# Patient Record
Sex: FEMALE | Race: WHITE | NOT HISPANIC OR LATINO | Employment: FULL TIME | URBAN - METROPOLITAN AREA
[De-identification: names, ages, dates, MRNs, and addresses within clinical notes are randomized per-mention and may not be internally consistent; named-entity substitution may affect disease eponyms.]

---

## 2017-01-03 ENCOUNTER — ALLSCRIPTS OFFICE VISIT (OUTPATIENT)
Dept: OTHER | Facility: OTHER | Age: 27
End: 2017-01-03

## 2017-01-04 ENCOUNTER — LAB REQUISITION (OUTPATIENT)
Dept: LAB | Facility: HOSPITAL | Age: 27
End: 2017-01-04
Payer: COMMERCIAL

## 2017-01-04 ENCOUNTER — OFFICE VISIT (OUTPATIENT)
Dept: CARDIAC REHAB | Age: 27
End: 2017-01-04
Payer: COMMERCIAL

## 2017-01-04 DIAGNOSIS — I47.1 ATRIAL TACHYCARDIA, PAROXYSMAL (HCC): ICD-10-CM

## 2017-01-04 DIAGNOSIS — Z30.432 ENCOUNTER FOR REMOVAL OF INTRAUTERINE CONTRACEPTIVE DEVICE: ICD-10-CM

## 2017-01-04 PROCEDURE — 87070 CULTURE OTHR SPECIMN AEROBIC: CPT | Performed by: OBSTETRICS & GYNECOLOGY

## 2017-01-05 ENCOUNTER — GENERIC CONVERSION - ENCOUNTER (OUTPATIENT)
Dept: OTHER | Facility: OTHER | Age: 27
End: 2017-01-05

## 2017-01-05 ENCOUNTER — ALLSCRIPTS OFFICE VISIT (OUTPATIENT)
Dept: OTHER | Facility: OTHER | Age: 27
End: 2017-01-05

## 2017-01-06 ENCOUNTER — APPOINTMENT (OUTPATIENT)
Dept: CARDIAC REHAB | Age: 27
End: 2017-01-06
Payer: COMMERCIAL

## 2017-01-06 LAB — BACTERIA GENITAL AEROBE CULT: NORMAL

## 2017-01-09 ENCOUNTER — APPOINTMENT (OUTPATIENT)
Dept: CARDIAC REHAB | Age: 27
End: 2017-01-09
Payer: COMMERCIAL

## 2017-01-11 ENCOUNTER — APPOINTMENT (OUTPATIENT)
Dept: CARDIAC REHAB | Age: 27
End: 2017-01-11
Payer: COMMERCIAL

## 2017-01-12 ENCOUNTER — ALLSCRIPTS OFFICE VISIT (OUTPATIENT)
Dept: OTHER | Facility: OTHER | Age: 27
End: 2017-01-12

## 2017-01-13 ENCOUNTER — APPOINTMENT (OUTPATIENT)
Dept: CARDIAC REHAB | Age: 27
End: 2017-01-13
Payer: COMMERCIAL

## 2017-01-16 ENCOUNTER — APPOINTMENT (OUTPATIENT)
Dept: CARDIAC REHAB | Age: 27
End: 2017-01-16
Payer: COMMERCIAL

## 2017-01-18 ENCOUNTER — APPOINTMENT (OUTPATIENT)
Dept: CARDIAC REHAB | Age: 27
End: 2017-01-18
Payer: COMMERCIAL

## 2017-01-20 ENCOUNTER — APPOINTMENT (OUTPATIENT)
Dept: CARDIAC REHAB | Age: 27
End: 2017-01-20
Payer: COMMERCIAL

## 2017-02-06 ENCOUNTER — APPOINTMENT (OUTPATIENT)
Dept: CARDIAC REHAB | Age: 27
End: 2017-02-06

## 2017-02-06 PROCEDURE — 93798 PHYS/QHP OP CAR RHAB W/ECG: CPT

## 2017-02-08 ENCOUNTER — APPOINTMENT (OUTPATIENT)
Dept: CARDIAC REHAB | Age: 27
End: 2017-02-08

## 2017-02-08 PROCEDURE — 93798 PHYS/QHP OP CAR RHAB W/ECG: CPT

## 2017-02-10 ENCOUNTER — APPOINTMENT (OUTPATIENT)
Dept: CARDIAC REHAB | Age: 27
End: 2017-02-10

## 2017-02-13 ENCOUNTER — GENERIC CONVERSION - ENCOUNTER (OUTPATIENT)
Dept: OTHER | Facility: OTHER | Age: 27
End: 2017-02-13

## 2017-02-13 ENCOUNTER — APPOINTMENT (OUTPATIENT)
Dept: CARDIAC REHAB | Age: 27
End: 2017-02-13

## 2017-02-15 ENCOUNTER — HOSPITAL ENCOUNTER (OUTPATIENT)
Dept: NON INVASIVE DIAGNOSTICS | Facility: HOSPITAL | Age: 27
Discharge: HOME | End: 2017-02-15
Attending: INTERNAL MEDICINE
Payer: COMMERCIAL

## 2017-02-15 DIAGNOSIS — R00.2 PALPITATIONS: ICD-10-CM

## 2017-02-15 DIAGNOSIS — R00.0 TACHYCARDIA: ICD-10-CM

## 2017-02-15 PROCEDURE — 93225 XTRNL ECG REC<48 HRS REC: CPT

## 2017-02-15 PROCEDURE — 93226 XTRNL ECG REC<48 HR SCAN A/R: CPT

## 2017-02-24 ENCOUNTER — ALLSCRIPTS OFFICE VISIT (OUTPATIENT)
Dept: OTHER | Facility: OTHER | Age: 27
End: 2017-02-24

## 2017-02-24 ENCOUNTER — GENERIC CONVERSION - ENCOUNTER (OUTPATIENT)
Dept: OTHER | Facility: OTHER | Age: 27
End: 2017-02-24

## 2017-03-28 ENCOUNTER — ALLSCRIPTS OFFICE VISIT (OUTPATIENT)
Dept: OTHER | Facility: OTHER | Age: 27
End: 2017-03-28

## 2017-03-30 ENCOUNTER — ALLSCRIPTS OFFICE VISIT (OUTPATIENT)
Dept: OTHER | Facility: OTHER | Age: 27
End: 2017-03-30

## 2017-03-30 ENCOUNTER — APPOINTMENT (OUTPATIENT)
Dept: LAB | Facility: HOSPITAL | Age: 27
End: 2017-03-30
Attending: OBSTETRICS & GYNECOLOGY
Payer: COMMERCIAL

## 2017-03-30 ENCOUNTER — GENERIC CONVERSION - ENCOUNTER (OUTPATIENT)
Dept: OTHER | Facility: OTHER | Age: 27
End: 2017-03-30

## 2017-03-30 DIAGNOSIS — O20.9 HEMORRHAGE IN EARLY PREGNANCY: ICD-10-CM

## 2017-03-30 DIAGNOSIS — Z32.01 ENCOUNTER FOR PREGNANCY TEST, RESULT POSITIVE: ICD-10-CM

## 2017-03-30 LAB
B-HCG SERPL-ACNC: 203 MIU/ML
PROGEST SERPL-MCNC: 0.8 NG/ML

## 2017-03-30 PROCEDURE — 84144 ASSAY OF PROGESTERONE: CPT

## 2017-03-30 PROCEDURE — 36415 COLL VENOUS BLD VENIPUNCTURE: CPT

## 2017-03-30 PROCEDURE — 84702 CHORIONIC GONADOTROPIN TEST: CPT

## 2017-04-02 ENCOUNTER — APPOINTMENT (OUTPATIENT)
Dept: LAB | Facility: MEDICAL CENTER | Age: 27
End: 2017-04-02
Payer: COMMERCIAL

## 2017-04-02 ENCOUNTER — TRANSCRIBE ORDERS (OUTPATIENT)
Dept: ADMINISTRATIVE | Facility: HOSPITAL | Age: 27
End: 2017-04-02

## 2017-04-02 DIAGNOSIS — O20.9 HEMORRHAGE IN EARLY PREGNANCY: ICD-10-CM

## 2017-04-02 DIAGNOSIS — Z32.01 ENCOUNTER FOR PREGNANCY TEST, RESULT POSITIVE: ICD-10-CM

## 2017-04-02 LAB — B-HCG SERPL-ACNC: 42 MIU/ML

## 2017-04-02 PROCEDURE — 36415 COLL VENOUS BLD VENIPUNCTURE: CPT

## 2017-04-02 PROCEDURE — 84702 CHORIONIC GONADOTROPIN TEST: CPT

## 2017-04-03 ENCOUNTER — GENERIC CONVERSION - ENCOUNTER (OUTPATIENT)
Dept: OTHER | Facility: OTHER | Age: 27
End: 2017-04-03

## 2017-11-01 ENCOUNTER — ALLSCRIPTS OFFICE VISIT (OUTPATIENT)
Dept: OTHER | Facility: OTHER | Age: 27
End: 2017-11-01

## 2018-01-12 NOTE — PROGRESS NOTES
Chief Complaint  Pt seen for bhcg and prog blood draw and started bleeding today with cramping  Pt is Rh +      Active Problems    1  Abscess of groin (682 2) (L02 214)   2  Bicornate uterus (752 34) (Q51 3)   3  Checking of intrauterine device (V25 42) (Z30 431)   4  Chest pain (786 50) (R07 9)   5  Dysfunctional uterine bleeding (626 8) (N93 8)   6  Encounter for gynecological examination without abnormal finding (V72 31) (Z01 419)   7  Encounter for removal of intrauterine contraceptive device (V25 12) (Z30 432)   8  First-trimester bleeding (640 93) (O20 9)   9  Migraine headache (346 90) (G43 909)   10  Obesity (278 00) (E66 9)   11  Palpitations (785 1) (R00 2)   12  Positive urine pregnancy test (V72 42) (Z32 01)   13  SOB (shortness of breath) (786 05) (R06 02)   14  Tachycardia (785 0) (R00 0)   15  Visit For: Exam Following Treatment (V67 59)    Current Meds   1  Topamax 100 MG Oral Tablet; take 2 tablet daily; Therapy: 94TKB1522 to Recorded    Allergies    1  No Known Drug Allergies    2   Pollen    Future Appointments    Date/Time Provider Specialty Site   04/27/2017 09:00 AM Cardiology, Device Remote   [address]   05/31/2017 02:30 PM Cardiology, Device Remote   [address]   04/05/2017 11:50 AM Sola Ross DO Obstetrics/Gynecology [facility] [address]   04/25/2017 08:40 AM Cheri Hall DO Cardiology  CARDIOLOGY  [facility]     Signatures   Electronically signed by : DANIELLE Vogel ; Mar 31 2017  4:13PM EST                       (Author)

## 2018-01-13 NOTE — RESULT NOTES
Verified Results  (1) HCG QUANT 02Apr2017 11:34AM Ariadne Choi    Order Number: [id number]     Test Name Result Flag Reference   HCG QUANTITATIVE 42 mIU/mL H <=6   Expected Ranges:     Approximate               Approximate HCG  Gestation age          Concentration ( mIU/mL)  _____________          ______________________   Ples Jungling                      HCG values  0 2-1                       5-50  1-2                           2-3                         100-5000  3-4                         500-59231  4-5                         1000-94742  5-6                         04000-140964  6-8                         [id number]  8-12                        84199-113564

## 2018-01-14 VITALS
BODY MASS INDEX: 28.41 KG/M2 | WEIGHT: 181 LBS | HEART RATE: 72 BPM | SYSTOLIC BLOOD PRESSURE: 108 MMHG | RESPIRATION RATE: 16 BRPM | DIASTOLIC BLOOD PRESSURE: 60 MMHG | HEIGHT: 67 IN

## 2018-01-15 VITALS
SYSTOLIC BLOOD PRESSURE: 110 MMHG | DIASTOLIC BLOOD PRESSURE: 68 MMHG | BODY MASS INDEX: 27.8 KG/M2 | WEIGHT: 177.13 LBS | HEIGHT: 67 IN

## 2018-01-16 NOTE — MISCELLANEOUS
Message  Message Free Text Note Form: spoke with pt  about potential cardiovascular effects of the levonorgestrel Luz Elena IUD;  I did discuss the potential for thrombosis and thromboembolic phenomena and would not advise the use of any hormonal therapy in the future; she stated she is now seeing Dr Mohit Fernandes and I reassured her that he is one of the best and pass on my regards to him at the next visit; she thanked me for the return call      Signatures   Electronically signed by : Renuka Herrera DO; Jan 5 2017  1:10PM EST                       (Author)

## 2018-01-17 NOTE — MISCELLANEOUS
Message  The patient called this evening stating that she started bleeding and that she is also cramping  I spoke with Dr Elen Bruno and she wants the patient to come in for blood work  The patient stated that she would attempt to make it in  The patient was also told that if the bleeding and the pain gets worse that she is to go to the ER for evaluation  Active Problems    1  Abscess of groin (682 2) (L02 214)   2  Bicornate uterus (752 34) (Q51 3)   3  Checking of intrauterine device (V25 42) (Z30 431)   4  Chest pain (786 50) (R07 9)   5  Dysfunctional uterine bleeding (626 8) (N93 8)   6  Encounter for gynecological examination without abnormal finding (V72 31) (Z01 419)   7  Encounter for removal of intrauterine contraceptive device (V25 12) (Z30 432)   8  First-trimester bleeding (640 93) (O20 9)   9  Migraine headache (346 90) (G43 909)   10  Obesity (278 00) (E66 9)   11  Palpitations (785 1) (R00 2)   12  Positive urine pregnancy test (V72 42) (Z32 01)   13  SOB (shortness of breath) (786 05) (R06 02)   14  Tachycardia (785 0) (R00 0)   15  Visit For: Exam Following Treatment (V67 59)    Current Meds   1  Topamax 100 MG Oral Tablet; take 2 tablet daily; Therapy: 66UMX7066 to Recorded    Allergies    1  No Known Drug Allergies    2  Pollen    Plan   (1) HCG QUANT; Status:Resulted - Requires Verification,Retrospective By Protocol Authorization;   Done: 78LYS0355 12:00AM  Due:30Mar2018; Ordered; For:First-trimester bleeding, Positive urine pregnancy test; Ordered By:Lauren Izquierdo;   (1) PROGESTERONE; Status:Resulted - Requires Verification,Retrospective By Protocol Authorization;   Done: 26JFR9773 12:00AM  Due:30Mar2018; Ordered;     For:First-trimester bleeding; Ordered Eze Jasmine;      Signatures   Electronically signed by : DANIELLE Urbina ; Mar 31 2017  4:13PM EST                       (Author)

## 2018-01-22 VITALS
WEIGHT: 183 LBS | DIASTOLIC BLOOD PRESSURE: 60 MMHG | SYSTOLIC BLOOD PRESSURE: 98 MMHG | HEART RATE: 59 BPM | HEIGHT: 67 IN | BODY MASS INDEX: 28.72 KG/M2

## 2018-03-06 ENCOUNTER — TELEPHONE (OUTPATIENT)
Dept: OBGYN CLINIC | Facility: MEDICAL CENTER | Age: 28
End: 2018-03-06

## 2018-03-06 NOTE — TELEPHONE ENCOUNTER
Spoke with pt   About her newly diagnosed pregnancy; she asked if I could be in the room for her scheduled  withb Dr Ainsley Verdugo or [facility]

## 2018-03-07 NOTE — PROGRESS NOTES
"  Discussion/Summary  Normal device function      Results/Data  Cardiac Device Remote [id number] 02:12AM Hoda Diez     Test Name Result Flag Reference   MISCELLANEOUS COMMENT      CARELINK TRANSMISSION: LOOP RECORDER  PRESENTING RHYTHM VS @ 77 BPM  BATTERY STATUS "OK"  NO PATIENT OR DEVICE ACTIVATED EPISODES  NORMAL DEVICE FUNCTION  DL   Cardiac Electrophysiology Report      slhb[id number]  pdf   DEVICE TYPE Monitor       Cardiac Electrophysiology Report [id number] 02:12AM Hodavirgilio Diez     Test Name Result Flag Reference   Cardiac Electrophysiology Report      [id number]  pdf     Signatures   Electronically signed by : Annabelle Arana, ; Jan 12 2017 12:55PM EST                       (Author)    Electronically signed by : Viri Kern DO; Jan 12 2017  8:17PM EST                       (Author)    "

## 2018-03-07 NOTE — PROGRESS NOTES
"  Discussion/Summary  Normal device function     Symptoms chest pain occurred with NSR  Results/Data  Cardiac Device Remote [id number] 04:49AM Radha Pierre     Test Name Result Flag Reference   MISCELLANEOUS COMMENT      CARELINK TRANSMISSION: *ALERT/NB*X0APT C/B 11/30/16 REGARDING SYMPTOMS  STATES SHE'LL TALK W/DR  DT 12/2/16 OV  EGRAM SHOWS SR  NC   Cardiac Electrophysiology Report      slhbiom[id number]  pdf   DEVICE TYPE Monitor       Cardiac Electrophysiology Report [id number] 04:49AM Radha Pierre     Test Name Result Flag Reference   Cardiac Electrophysiology Report      [id number]  pdf     Signatures   Electronically signed by : Rosita Jernigan, ; Nov 30 2016  2:00PM EST                       (Author)    Electronically signed by : Wolf Theodore DO; Dec  4 2016  3:25PM EST                       (Author)    "

## 2018-03-07 NOTE — PROGRESS NOTES
"  Discussion/Summary  Normal device function      Results/Data  Cardiac Device Remote [id number] 03:12AM Neita webmeed     Test Name Result Flag Reference   MISCELLANEOUS COMMENT      CARELINK TRANSMISSION: BATTERY STATUS "OK"  NO DEVICE DETECTED EPISODES  2 PT ACTIVATED EPISODES PREVIOUSLY ADDRESSED  PRESENTING RHYTHM NSR W/ PVC  NORMAL DEVICE FUNCTION  GV   Cardiac Electrophysiology Report      slhb[id number]  pdf   DEVICE TYPE Monitor       Cardiac Electrophysiology Report [id number] 03:12AM Neita Halsted     Test Name Result Flag Reference   Cardiac Electrophysiology Report      [id number]  pdf     Signatures   Electronically signed by : Isai Iverson RN; Feb 24 2017  8:56AM EST                       (Author)    Electronically signed by : Rose Davison DO; Mar  3 2017  1:29PM EST                       (Author)    "

## 2018-03-07 NOTE — PROGRESS NOTES
"  Discussion/Summary  Normal device function      Results/Data  Cardiac Device Remote [id number] 04:18AM Lisa Laoa     Test Name Result Flag Reference   MISCELLANEOUS COMMENT      CARELINK TRANSMISSION: BATTERY STATUS "OK"  NO PATIENT OR DEVICE ACTIVATED EPISODES  NORMAL DEVICE FUNCTION  NC   Cardiac Electrophysiology Report      slh[id number]  pdf   DEVICE TYPE Monitor       Cardiac Electrophysiology Report [id number] 04:18AM Lisa Laoa     Test Name Result Flag Reference   Cardiac Electrophysiology Report      [id number] pdf     Signatures   Electronically signed by : Gabriella Thomas, ; Dec 14 2016 11:21AM EST                       (Author)    Electronically signed by : DANIELLE Solo ; Dec 18 2016  9:11AM EST                       (Author)    "

## 2018-03-07 NOTE — PROGRESS NOTES
"  Discussion/Summary  Normal device function      Results/Data  Cardiac Device Remote [id number] 01:36AM Rhett Hanson     Test Name Result Flag Reference   MISCELLANEOUS COMMENT      CARELINK TRANSMISSION: LOOP RECORDER  PRESENTING RHYTHM VS @ 71 BPM  BATTERY STATUS "OK"  2 TACHY EPISODES W/ EGRAMS SHOWING SINUS TACH W/ MYOPOTENTIAL OVERSENSING  NO PATIENT ACTIVATED EPISODES  NORMAL DEVICE FUNCTION  DL   Cardiac Electrophysiology Report      slhb[id number]_[id number]  pdf   DEVICE TYPE Monitor       Cardiac Electrophysiology Report [id number] 01:36AM Rhett Hanson     Test Name Result Flag Reference   Cardiac Electrophysiology Report      [id number]  pdf     Signatures   Electronically signed by : Annabelle Arana, ; Mar 28 2017 11:27AM EST                       (Author)    Electronically signed by : DANIELLE Arndt ; Mar 28 2017  6:27PM EST                       (Author)    "

## 2018-03-07 NOTE — PROGRESS NOTES
"  Discussion/Summary  Normal device function      Results/Data  Cardiac Device Remote [id number] 12:34AM Marivel Long     Test Name Result Flag Reference   MISCELLANEOUS COMMENT      CARELINK TRANSMISSION: LOOP RECORDER  PRESENTING RHYTHM VS @ 68 BPM  BATTERY STATUS "OK"  8 TACHY EPISODES W/ EGRAMS SHOWING SINUS TACH @ 167-176 BPM  LONGEST EPISODE 1 MIN  NO PATIENT ACTIVATED EPISODES  NORMAL DEVICE FUNCTION  DL   Cardiac Electrophysiology Report      [id number]  pdf   DEVICE TYPE Monitor       Cardiac Electrophysiology Report [id number] 12:34AM Marivel Long     Test Name Result Flag Reference   Cardiac Electrophysiology Report      [id number]  pdf     Signatures   Electronically signed by : Baljeet Fuentes, ; Nov 1 2017 10:27AM EST                       (Author)    Electronically signed by : DANIELLE Nava ; Nov 1 2017 12:27PM EST                       (Author)    "

## 2018-03-07 NOTE — PROGRESS NOTES
"  Discussion/Summary  Normal device function     Reviewed in entirety  Results/Data  Results   Cardiac Device In Clinic [id number] 09:34PM Bhupendra Car     Test Name Result Flag Reference   MISCELLANEOUS COMMENT (Report)     DEVICE INTERROGATED IN THE [place] OFFICE WITH DR MILI DANIEL-2ND OPINION: X0APT HAD ABLATION 1/6/16; PT ON VERAPAMIL  N1YHCWOBZJ VOLTAGE ADEQUATE  NO NEW SIGNIFICANT HIGH RATE EPISODES  DURING ABLATION PT HAD 1 PAUSE AND AFIB NOTED  BEFORE PROCEDURE PT HAD TACHY EVENTS IN WHICH SHE WAS SYMPTOMATIC WITH  RATE DECREASED FROM 167-150 BPM FOR TACHY STORAGE  NORMAL DEVICE FUNCTION  NC   Cardiac Electrophysiology Report      [id number]  pdf   DEVICE TYPE Monitor       Cardiac Electrophysiology Report [id number] 09:34PM Bhupendra Car     Test Name Result Flag Reference   Cardiac Electrophysiology Report      [id number]  pdf     Signatures   Electronically signed by : Gabriella Thomas, ; Feb 26 2016  2:17PM EST                       (Author)    Electronically signed by : Dileep Leblanc DO; Feb 28 2016  6:57AM EST                       (Author)    "

## 2018-03-07 NOTE — PROGRESS NOTES
"  Discussion/Summary  Normal device function      Results/Data  Cardiac Device Remote [id number] 03:20AM Zelphia Lung     Test Name Result Flag Reference   MISCELLANEOUS COMMENT (Report)     NONBILLABLE- CARELINK TRANSMISSION PER DR Mina Interiano FOR PT'S C/O PALPS: DEVICE DETECTED 35 TACHY EPISODES, LAST EPISODE ON 11/4/16 LASTING 19 SEC @ 158 BPM- ST ON ECG  PT ACTIVATED EPISODE EARLIER SAME DAY- NSR W/ PVC, AVG HR- 80 BPM ON ECG  PT ACTIVATED EPISODE ON 10/26/16- NSR W/ PVC'S ON ECG  PT ON FLECAINIDE  PRESENTING RHYTHM NSR  NORMAL DEVICE FUNCTION  GV   Cardiac Electrophysiology Report      slh[id number]  pdf   DEVICE TYPE Monitor       Cardiac Electrophysiology Report [id number] 03:20AM Zelphia Lung     Test Name Result Flag Reference   Cardiac Electrophysiology Report      [id number] pdf     Signatures   Electronically signed by : Janice Vu RN; Nov 18 2016 10:15AM EST                       (Author)    Electronically signed by : Gini Whittaker DO; Nov 25 2016 11:58AM EST                       (Author)    "

## 2018-03-22 ENCOUNTER — CLINICAL SUPPORT (OUTPATIENT)
Dept: OBGYN CLINIC | Facility: MEDICAL CENTER | Age: 28
End: 2018-03-22
Payer: COMMERCIAL

## 2018-03-22 ENCOUNTER — TELEPHONE (OUTPATIENT)
Dept: OBGYN CLINIC | Facility: MEDICAL CENTER | Age: 28
End: 2018-03-22

## 2018-03-22 DIAGNOSIS — Z32.01 POSITIVE URINE PREGNANCY TEST: Primary | ICD-10-CM

## 2018-03-22 LAB
ABO GROUP BLD: NORMAL
B-HCG SERPL-ACNC: ABNORMAL MIU/ML
BLD GP AB SCN SERPL QL: NEGATIVE
PROGEST SERPL-MCNC: 21.4 NG/ML
RH BLD: POSITIVE
SPECIMEN EXPIRATION DATE: NORMAL

## 2018-03-22 PROCEDURE — 84702 CHORIONIC GONADOTROPIN TEST: CPT | Performed by: OBSTETRICS & GYNECOLOGY

## 2018-03-22 PROCEDURE — 86900 BLOOD TYPING SEROLOGIC ABO: CPT | Performed by: OBSTETRICS & GYNECOLOGY

## 2018-03-22 PROCEDURE — 84144 ASSAY OF PROGESTERONE: CPT | Performed by: OBSTETRICS & GYNECOLOGY

## 2018-03-22 PROCEDURE — 36415 COLL VENOUS BLD VENIPUNCTURE: CPT | Performed by: OBSTETRICS & GYNECOLOGY

## 2018-03-22 PROCEDURE — 86850 RBC ANTIBODY SCREEN: CPT | Performed by: OBSTETRICS & GYNECOLOGY

## 2018-03-22 PROCEDURE — 86901 BLOOD TYPING SEROLOGIC RH(D): CPT | Performed by: OBSTETRICS & GYNECOLOGY

## 2018-03-22 NOTE — TELEPHONE ENCOUNTER
The patient was seen in the office today for her blood work and then she asked if she could possibly get a prescription  She stated that she is using the over the counter combination of the Unisom and B6 right now and that it is and it is not working  So she wondered if at all possible she could have a prescription for Zofran if needed to help

## 2018-03-22 NOTE — TELEPHONE ENCOUNTER
I would like for her to try the Diclegis first  I typically don't like prescribing Zofran in the first trimester  There may be an increased risk for a cleft palate  Did she try lowell?

## 2018-03-23 ENCOUNTER — HOSPITAL ENCOUNTER (OUTPATIENT)
Dept: ULTRASOUND IMAGING | Facility: HOSPITAL | Age: 28
Discharge: HOME | End: 2018-03-23
Attending: OBSTETRICS & GYNECOLOGY
Payer: COMMERCIAL

## 2018-03-23 ENCOUNTER — TRANSCRIBE ORDERS (OUTPATIENT)
Dept: ADMINISTRATIVE | Facility: HOSPITAL | Age: 28
End: 2018-03-23

## 2018-03-23 DIAGNOSIS — O36.80X0 ENCOUNTER TO DETERMINE FETAL VIABILITY OF PREGNANCY, SINGLE OR UNSPECIFIED FETUS: Primary | ICD-10-CM

## 2018-03-23 DIAGNOSIS — O36.80X0 ENCOUNTER TO DETERMINE FETAL VIABILITY OF PREGNANCY, SINGLE OR UNSPECIFIED FETUS: ICD-10-CM

## 2018-03-23 PROCEDURE — 76801 OB US < 14 WKS SINGLE FETUS: CPT

## 2018-03-23 NOTE — TELEPHONE ENCOUNTER
The patient was made aware and she is going to try additional pills to see if that helps and if not she will call and let us know

## 2018-03-28 ENCOUNTER — INITIAL PRENATAL (OUTPATIENT)
Dept: OBGYN CLINIC | Facility: MEDICAL CENTER | Age: 28
End: 2018-03-28
Payer: COMMERCIAL

## 2018-03-28 DIAGNOSIS — O21.9 NAUSEA/VOMITING IN PREGNANCY: ICD-10-CM

## 2018-03-28 DIAGNOSIS — Z34.90 PREGNANCY, UNSPECIFIED GESTATIONAL AGE: Primary | ICD-10-CM

## 2018-03-28 LAB
ABO GROUP BLD: NORMAL
BASOPHILS # BLD AUTO: 0.02 THOUSANDS/ΜL (ref 0–0.1)
BASOPHILS NFR BLD AUTO: 0 % (ref 0–1)
BILIRUB UR QL STRIP: NEGATIVE
BLD GP AB SCN SERPL QL: NEGATIVE
CLARITY UR: NORMAL
COLOR UR: YELLOW
EOSINOPHIL # BLD AUTO: 0.39 THOUSAND/ΜL (ref 0–0.61)
EOSINOPHIL NFR BLD AUTO: 4 % (ref 0–6)
ERYTHROCYTE [DISTWIDTH] IN BLOOD BY AUTOMATED COUNT: 13.3 % (ref 11.6–15.1)
GLUCOSE UR STRIP-MCNC: NEGATIVE MG/DL
HBV SURFACE AG SER QL: NORMAL
HCT VFR BLD AUTO: 41.6 % (ref 34.8–46.1)
HGB BLD-MCNC: 14 G/DL (ref 11.5–15.4)
HGB UR QL STRIP.AUTO: NEGATIVE
KETONES UR STRIP-MCNC: NEGATIVE MG/DL
LEUKOCYTE ESTERASE UR QL STRIP: NEGATIVE
LYMPHOCYTES # BLD AUTO: 2.75 THOUSANDS/ΜL (ref 0.6–4.47)
LYMPHOCYTES NFR BLD AUTO: 31 % (ref 14–44)
MCH RBC QN AUTO: 28.9 PG (ref 26.8–34.3)
MCHC RBC AUTO-ENTMCNC: 33.7 G/DL (ref 31.4–37.4)
MCV RBC AUTO: 86 FL (ref 82–98)
MONOCYTES # BLD AUTO: 0.51 THOUSAND/ΜL (ref 0.17–1.22)
MONOCYTES NFR BLD AUTO: 6 % (ref 4–12)
NEUTROPHILS # BLD AUTO: 5.32 THOUSANDS/ΜL (ref 1.85–7.62)
NEUTS SEG NFR BLD AUTO: 59 % (ref 43–75)
NITRITE UR QL STRIP: NEGATIVE
NRBC BLD AUTO-RTO: 0 /100 WBCS
PH UR STRIP.AUTO: 7.5 [PH] (ref 4.5–8)
PLATELET # BLD AUTO: 305 THOUSANDS/UL (ref 149–390)
PMV BLD AUTO: 9.9 FL (ref 8.9–12.7)
PROT UR STRIP-MCNC: NEGATIVE MG/DL
RBC # BLD AUTO: 4.85 MILLION/UL (ref 3.81–5.12)
RH BLD: POSITIVE
RUBV IGG SERPL IA-ACNC: 146 IU/ML
SP GR UR STRIP.AUTO: 1.02 (ref 1–1.03)
SPECIMEN EXPIRATION DATE: NORMAL
UROBILINOGEN UR QL STRIP.AUTO: 0.2 E.U./DL
WBC # BLD AUTO: 9.01 THOUSAND/UL (ref 4.31–10.16)

## 2018-03-28 PROCEDURE — 80081 OBSTETRIC PANEL INC HIV TSTG: CPT | Performed by: OBSTETRICS & GYNECOLOGY

## 2018-03-28 PROCEDURE — OBC: Performed by: OBSTETRICS & GYNECOLOGY

## 2018-03-28 PROCEDURE — 36415 COLL VENOUS BLD VENIPUNCTURE: CPT | Performed by: OBSTETRICS & GYNECOLOGY

## 2018-03-28 PROCEDURE — 81003 URINALYSIS AUTO W/O SCOPE: CPT | Performed by: OBSTETRICS & GYNECOLOGY

## 2018-03-28 PROCEDURE — 87086 URINE CULTURE/COLONY COUNT: CPT | Performed by: OBSTETRICS & GYNECOLOGY

## 2018-03-28 RX ORDER — LANOLIN ALCOHOL/MO/W.PET/CERES
50 CREAM (GRAM) TOPICAL
COMMUNITY
End: 2018-09-19

## 2018-03-28 RX ORDER — DIPHENHYDRAMINE HYDROCHLORIDE 25 MG/1
25 CAPSULE ORAL 2 TIMES DAILY
COMMUNITY
End: 2018-06-27

## 2018-03-28 NOTE — PROGRESS NOTES
Presents for OB Ed visit  C/O severe nausea and fatigue  Has h/o SVT with implanted LOOP recorder--advised to inform cardiologist of pregnancy  Rx  For Reglan sent  Unable to obtain CF lab test today  Will obtain at next visit    Pt informed of same

## 2018-03-29 LAB
BACTERIA UR CULT: NORMAL
RPR SER QL: NORMAL

## 2018-03-30 LAB — HIV 1+2 AB+HIV1 P24 AG SERPL QL IA: NORMAL

## 2018-04-03 RX ORDER — METOCLOPRAMIDE 5 MG/1
5 TABLET ORAL EVERY 6 HOURS PRN
Qty: 30 TABLET | Refills: 0 | Status: CANCELLED | OUTPATIENT
Start: 2018-04-03

## 2018-04-05 ENCOUNTER — INITIAL PRENATAL (OUTPATIENT)
Dept: OBGYN CLINIC | Facility: MEDICAL CENTER | Age: 28
End: 2018-04-05
Payer: COMMERCIAL

## 2018-04-05 VITALS — WEIGHT: 187 LBS | SYSTOLIC BLOOD PRESSURE: 120 MMHG | BODY MASS INDEX: 29.29 KG/M2 | DIASTOLIC BLOOD PRESSURE: 62 MMHG

## 2018-04-05 DIAGNOSIS — O21.9 NAUSEA/VOMITING IN PREGNANCY: ICD-10-CM

## 2018-04-05 DIAGNOSIS — Z34.91 ENCOUNTER FOR PREGNANCY RELATED EXAMINATION IN FIRST TRIMESTER: Primary | ICD-10-CM

## 2018-04-05 PROCEDURE — 99213 OFFICE O/P EST LOW 20 MIN: CPT | Performed by: OBSTETRICS & GYNECOLOGY

## 2018-04-05 PROCEDURE — 87491 CHLMYD TRACH DNA AMP PROBE: CPT | Performed by: OBSTETRICS & GYNECOLOGY

## 2018-04-05 PROCEDURE — 87591 N.GONORRHOEAE DNA AMP PROB: CPT | Performed by: OBSTETRICS & GYNECOLOGY

## 2018-04-05 RX ORDER — DOXYLAMINE SUCCINATE AND PYRIDOXINE HYDROCHLORIDE, DELAYED RELEASE TABLETS 10 MG/10 MG 10; 10 MG/1; MG/1
1 TABLET, DELAYED RELEASE ORAL DAILY
Qty: 120 TABLET | Refills: 0 | Status: SHIPPED | OUTPATIENT
Start: 2018-04-05 | End: 2018-06-27

## 2018-04-05 NOTE — PROGRESS NOTES
Elliot Jesus is a 32y o  year old L0E1122 at [address] for first prenatal visit  Nausea Yes Vomiting Yes taking unisom and vitamin b6  Exam done today - see OB flowsheet  Pap done No due next year  Gonorrhea and Chlamydia sent  Labs reviewed    Genetic testing   Prior C/S x 2 - for repeat at 39 weeks  Cardiac history - will follow with cardiologist  Has appointment with [facility]    Pt has been counseled re diet, exercise, weight gain, foods to avoid, vaccines in pregnancy, trisomy screening, travel precautions to include seat belt use and VTE risk reduction  She has been provided our pregnancy packet which includes how and when to contact providers, medication recommendations, dietary suggestions, breastfeeding information as well as websites for additional information, hospital and delivery concerns

## 2018-04-07 LAB
CHLAMYDIA DNA CVX QL NAA+PROBE: NORMAL
N GONORRHOEA DNA GENITAL QL NAA+PROBE: NORMAL

## 2018-04-18 ENCOUNTER — ROUTINE PRENATAL (OUTPATIENT)
Dept: PERINATAL CARE | Facility: CLINIC | Age: 28
End: 2018-04-18
Payer: COMMERCIAL

## 2018-04-18 VITALS
WEIGHT: 190 LBS | DIASTOLIC BLOOD PRESSURE: 67 MMHG | BODY MASS INDEX: 28.79 KG/M2 | HEART RATE: 78 BPM | HEIGHT: 68 IN | SYSTOLIC BLOOD PRESSURE: 96 MMHG

## 2018-04-18 DIAGNOSIS — Z3A.12 12 WEEKS GESTATION OF PREGNANCY: ICD-10-CM

## 2018-04-18 DIAGNOSIS — Q51.3 BICORNUATE UTERUS AFFECTING PREGNANCY IN FIRST TRIMESTER, ANTEPARTUM: ICD-10-CM

## 2018-04-18 DIAGNOSIS — O34.01 BICORNUATE UTERUS AFFECTING PREGNANCY IN FIRST TRIMESTER, ANTEPARTUM: ICD-10-CM

## 2018-04-18 DIAGNOSIS — Z36.82 ENCOUNTER FOR ANTENATAL SCREENING FOR NUCHAL TRANSLUCENCY: Primary | ICD-10-CM

## 2018-04-18 DIAGNOSIS — Q51.3 BICORNATE UTERUS: ICD-10-CM

## 2018-04-18 DIAGNOSIS — I47.1 SVT (SUPRAVENTRICULAR TACHYCARDIA) (HCC): ICD-10-CM

## 2018-04-18 PROCEDURE — 99241 PR OFFICE CONSULTATION NEW/ESTAB PATIENT 15 MIN: CPT | Performed by: OBSTETRICS & GYNECOLOGY

## 2018-04-18 PROCEDURE — 76801 OB US < 14 WKS SINGLE FETUS: CPT | Performed by: OBSTETRICS & GYNECOLOGY

## 2018-04-18 PROCEDURE — 76813 OB US NUCHAL MEAS 1 GEST: CPT | Performed by: OBSTETRICS & GYNECOLOGY

## 2018-04-18 NOTE — PATIENT INSTRUCTIONS
Thank you for choosing [facility] for your  care today  If you have any questions about your ultrasound or care, please do not hesitate to contact us or your primary obstetrician  Please return in 7-8 weeks for your next ultrasound to check on the fetal anatomy

## 2018-04-18 NOTE — PROGRESS NOTES
ERICK Ro[address] 51: Ms Veronica Wilder was seen today at 12w2d for nuchal translucency ultrasound  See ultrasound report under "OB Procedures" tab  Please don't hesitate to contact our office with any concerns or questions    Nupur Dia MD

## 2018-04-21 LAB
# FETUSES US: 1
AGE: NORMAL
B-HCG ADJ MOM SERPL: 1.22
B-HCG SERPL-ACNC: 85.6 IU/ML
COLLECT DATE: NORMAL
CURRENT SMOKER: NO
DONATED EGG PATIENT QL: NO
FET CRL US.MEAS: 66 MM
FET CRL US.MEAS: NORMAL MM
FET NUCHAL FOLD MOM THICKNESS US.MEAS: 1.15
FET NUCHAL FOLD THICKNESS US.MEAS: 1.7 MM
FET NUCHAL FOLD THICKNESS US.MEAS: NORMAL MM
FET TS 21 RISK FROM MAT AGE: NORMAL
GA CLIN EST: 12.7 WK
GA METHOD: NORMAL
HX OF NTD NARR: NO
HX OF TRISOMY 21 NARR: NO
IDDM PATIENT QL: NO
INTEGRATED SCN PATIENT-IMP: NORMAL
PAPP-A MOM SERPL: 1.19
PAPP-A SERPL-MCNC: 827.3 NG/ML
PHYSICIAN NPI: NORMAL
SL AMB NASAL BONE: PRESENT
SL AMB NTQR LOCATION ID#: NORMAL
SL AMB NTQR READING PHYS ID#: NORMAL
SL AMB REFERRING PHYSICIAN NAME: NORMAL
SL AMB REFERRING PHYSICIAN PHONE: NORMAL
SL AMB REPEAT SPECIMEN: NORMAL
SL AMB TWIN B NASAL BONE: NORMAL
SONOGRAPHER NAME: NORMAL
SONOGRAPHER: NORMAL
SONOGRAPHER: NORMAL
TS 18 RISK FETUS: NORMAL
TS 21 RISK FETUS: NORMAL
US DATE: NORMAL
US FETUSES STUDY [IMP]: NORMAL

## 2018-04-23 ENCOUNTER — TELEPHONE (OUTPATIENT)
Dept: PERINATAL CARE | Facility: CLINIC | Age: 28
End: 2018-04-23

## 2018-04-30 ENCOUNTER — ROUTINE PRENATAL (OUTPATIENT)
Dept: OBGYN CLINIC | Facility: MEDICAL CENTER | Age: 28
End: 2018-04-30
Payer: COMMERCIAL

## 2018-04-30 VITALS — SYSTOLIC BLOOD PRESSURE: 108 MMHG | BODY MASS INDEX: 29.56 KG/M2 | DIASTOLIC BLOOD PRESSURE: 64 MMHG | WEIGHT: 194.4 LBS

## 2018-04-30 DIAGNOSIS — Z3A.14 14 WEEKS GESTATION OF PREGNANCY: Primary | ICD-10-CM

## 2018-04-30 PROCEDURE — 99213 OFFICE O/P EST LOW 20 MIN: CPT | Performed by: NURSE PRACTITIONER

## 2018-04-30 NOTE — PROGRESS NOTES
Jorge Alberto Chávez is a 32y o  year old  at 14w0d for routine prenatal visit    + FM, no vaginal bleeding, contractions, or LOF  Complaints: No cardiac c/o  -  c/o mild heartburn, rec  tums, and occas  Mild headaches  Most recent ultrasound and labs reviewed  Has level 2 scheduled  Tdap given  Pt making appt w cardiologist 2nd trimester evaluation  Going to [facility] for 10 days on may 4  Will need part 2 of seq  Screen at next visit  ptl precautions reviewed

## 2018-05-22 LAB
# FETUSES US: 1
AFP ADJ MOM SERPL: 0.58
AFP SERPL-MCNC: 18.4 NG/ML
B-HCG ADJ MOM SERPL: 0.97
B-HCG SERPL-ACNC: 23.9 IU/ML
COLLECT DATE: NORMAL
CURRENT SMOKER: NO
FET CRL US.MEAS: 66 MM
FET NUCHAL FOLD MOM THICKNESS US.MEAS: 1.15
FET NUCHAL FOLD THICKNESS US.MEAS: 1.7 MM
FET TS 21 RISK FROM MAT AGE: NORMAL
GA CLIN EST: 17 WK
HX OF NTD NARR: NO
IDDM PATIENT QL: NO
INHIBIN A ADJ MOM SERPL: 0.51
INHIBIN A SERPL-MCNC: 76 PG/ML
INTEGRATED SCN PATIENT-IMP: NORMAL
NEURAL TUBE DEFECT RISK FETUS: NORMAL %
PAPP-A MOM SERPL: 1.19
PAPP-A SERPL-MCNC: 827.3 NG/ML
PHYSICIAN NPI: NORMAL
SL AMB NASAL BONE: PRESENT
SL AMB REFERRING PHYSICIAN NAME: NORMAL
SL AMB REFERRING PHYSICIAN PHONE: NORMAL
SL AMB REPEAT SPECIMEN: NORMAL
SL AMB SPECIMEN # FROM PART 1: NORMAL
SL AMB TWIN B NASAL BONE: NORMAL
TS 18 RISK FETUS: NORMAL
TS 21 RISK FETUS: NORMAL
U ESTRIOL ADJ MOM SERPL: 0.69
U ESTRIOL SERPL-MCNC: 0.63 NG/ML
US DATE: NORMAL

## 2018-05-24 ENCOUNTER — TELEPHONE (OUTPATIENT)
Dept: PERINATAL CARE | Facility: CLINIC | Age: 28
End: 2018-05-24

## 2018-05-24 NOTE — TELEPHONE ENCOUNTER
----- Message from Sandi Canas MD sent at 5/23/2018  4:31 PM EDT -----  [address] [place] [address] staff, I've reviewed this Sequential Part 2 result which is normal, can you call her regarding this result? I left her a note in [address]  Thank you    Sandi Canas MD

## 2018-05-31 ENCOUNTER — ROUTINE PRENATAL (OUTPATIENT)
Dept: OBGYN CLINIC | Facility: MEDICAL CENTER | Age: 28
End: 2018-05-31
Payer: COMMERCIAL

## 2018-05-31 VITALS — WEIGHT: 198 LBS | DIASTOLIC BLOOD PRESSURE: 62 MMHG | SYSTOLIC BLOOD PRESSURE: 112 MMHG | BODY MASS INDEX: 30.11 KG/M2

## 2018-05-31 DIAGNOSIS — Z3A.18 18 WEEKS GESTATION OF PREGNANCY: ICD-10-CM

## 2018-05-31 DIAGNOSIS — Z34.92 SECOND TRIMESTER PREGNANCY: Primary | ICD-10-CM

## 2018-05-31 PROCEDURE — 99213 OFFICE O/P EST LOW 20 MIN: CPT | Performed by: OBSTETRICS & GYNECOLOGY

## 2018-05-31 NOTE — PROGRESS NOTES
Gerhardt Revel is a 32y o  year old  at 18w3d for routine prenatal visit    + FM, no vaginal bleeding, contractions, or LOF  Complaints: No   Most recent ultrasound and labs reviewed    Has level 2 scheduled

## 2018-06-07 ENCOUNTER — ROUTINE PRENATAL (OUTPATIENT)
Dept: PERINATAL CARE | Facility: CLINIC | Age: 28
End: 2018-06-07
Payer: COMMERCIAL

## 2018-06-07 VITALS
BODY MASS INDEX: 30.04 KG/M2 | HEIGHT: 68 IN | HEART RATE: 87 BPM | DIASTOLIC BLOOD PRESSURE: 64 MMHG | WEIGHT: 198.2 LBS | SYSTOLIC BLOOD PRESSURE: 95 MMHG

## 2018-06-07 DIAGNOSIS — Z36.86 ENCOUNTER FOR ANTENATAL SCREENING FOR CERVICAL LENGTH: ICD-10-CM

## 2018-06-07 DIAGNOSIS — Z3A.19 19 WEEKS GESTATION OF PREGNANCY: ICD-10-CM

## 2018-06-07 DIAGNOSIS — O34.219 HISTORY OF CESAREAN DELIVERY, ANTEPARTUM: ICD-10-CM

## 2018-06-07 DIAGNOSIS — Q51.3 BICORNATE UTERUS: ICD-10-CM

## 2018-06-07 DIAGNOSIS — I47.1 SVT (SUPRAVENTRICULAR TACHYCARDIA) (HCC): ICD-10-CM

## 2018-06-07 DIAGNOSIS — Z36.3 ENCOUNTER FOR ANTENATAL SCREENING FOR MALFORMATIONS: Primary | ICD-10-CM

## 2018-06-07 PROCEDURE — 76811 OB US DETAILED SNGL FETUS: CPT | Performed by: OBSTETRICS & GYNECOLOGY

## 2018-06-07 PROCEDURE — 76817 TRANSVAGINAL US OBSTETRIC: CPT | Performed by: OBSTETRICS & GYNECOLOGY

## 2018-06-07 NOTE — PROGRESS NOTES
ERICK Ro[address] 53: Ms Pamela Barksdale was seen today at 19w3d for anatomic survey and cervical length screening ultrasound  See ultrasound report under "OB Procedures" tab  Please don't hesitate to contact our office with any concerns or questions    Barb Jones MD

## 2018-06-07 NOTE — PROGRESS NOTES
A transvaginal ultrasound was performed  Sonographer note on use of High Level Disinfection Process (Trophon) for transvaginal probe# 1used, serial [id number]    Destiny Pierson, [address]

## 2018-06-07 NOTE — PATIENT INSTRUCTIONS
Thank you for choosing [facility] for your  care today  If you have any questions about your ultrasound or care, please do not hesitate to contact us or your primary obstetrician  Please return in 12 weeks for your next ultrasound to check on the fetal growth

## 2018-06-27 ENCOUNTER — ROUTINE PRENATAL (OUTPATIENT)
Dept: OBGYN CLINIC | Facility: MEDICAL CENTER | Age: 28
End: 2018-06-27
Payer: COMMERCIAL

## 2018-06-27 VITALS — SYSTOLIC BLOOD PRESSURE: 110 MMHG | BODY MASS INDEX: 30.94 KG/M2 | DIASTOLIC BLOOD PRESSURE: 70 MMHG | WEIGHT: 203.5 LBS

## 2018-06-27 DIAGNOSIS — Z34.92 SECOND TRIMESTER PREGNANCY: Primary | ICD-10-CM

## 2018-06-27 PROCEDURE — 99213 OFFICE O/P EST LOW 20 MIN: CPT | Performed by: OBSTETRICS & GYNECOLOGY

## 2018-06-27 NOTE — PROGRESS NOTES
Gurmeet Valladares is a 25y o  year old  at 22w2d for routine prenatal visit    + FM, no vaginal bleeding, contractions, or LOF  Complaints: Yes - swelling of ankles - supportive measures encouraged   Most recent ultrasound and labs reviewed    All questions answered

## 2018-07-25 ENCOUNTER — ROUTINE PRENATAL (OUTPATIENT)
Dept: OBGYN CLINIC | Facility: MEDICAL CENTER | Age: 28
End: 2018-07-25
Payer: COMMERCIAL

## 2018-07-25 VITALS — WEIGHT: 211 LBS | SYSTOLIC BLOOD PRESSURE: 112 MMHG | BODY MASS INDEX: 32.08 KG/M2 | DIASTOLIC BLOOD PRESSURE: 64 MMHG

## 2018-07-25 DIAGNOSIS — Z3A.26 26 WEEKS GESTATION OF PREGNANCY: Primary | ICD-10-CM

## 2018-07-25 PROCEDURE — 99213 OFFICE O/P EST LOW 20 MIN: CPT | Performed by: NURSE PRACTITIONER

## 2018-07-25 NOTE — PROGRESS NOTES
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  It was recommended that she try to decrease the amount of our she works also suggested she use meditation apps on her smart phone and seek help from family and friends as needed  We discussed the use of medication if she feels that the symptoms are not controllable for her but she is not interested in this at this time  We also discussed sending her for therapy sessions but she also declines  She feels that the fatigue is probably from her long hours and difficulty sleeping at night because of problems getting comfortable  She is also complaining of suprapubic pain and pain in her upper thighs when she gets up from lying down  Recommended she try an over-the-counter belly band also offered PT referral which she declines at this time  She will continue to monitor her symptoms and if she changes her mind about any of the options I gave her she knows to call the office and we will arrange for them  Most recent ultrasound and labs reviewed  Has growth scan the end of August   Next appointment is on  for her glucose and CBC  Pre term labor precautions reviewed

## 2018-08-08 ENCOUNTER — ROUTINE PRENATAL (OUTPATIENT)
Dept: OBGYN CLINIC | Facility: MEDICAL CENTER | Age: 28
End: 2018-08-08
Payer: COMMERCIAL

## 2018-08-08 ENCOUNTER — CLINICAL SUPPORT (OUTPATIENT)
Dept: OBGYN CLINIC | Facility: MEDICAL CENTER | Age: 28
End: 2018-08-08
Payer: COMMERCIAL

## 2018-08-08 VITALS — BODY MASS INDEX: 32.39 KG/M2 | SYSTOLIC BLOOD PRESSURE: 100 MMHG | DIASTOLIC BLOOD PRESSURE: 60 MMHG | WEIGHT: 213 LBS

## 2018-08-08 DIAGNOSIS — Z3A.28 28 WEEKS GESTATION OF PREGNANCY: Primary | ICD-10-CM

## 2018-08-08 DIAGNOSIS — Z34.93 THIRD TRIMESTER PREGNANCY: ICD-10-CM

## 2018-08-08 DIAGNOSIS — O34.219 H/O CESAREAN SECTION COMPLICATING PREGNANCY: Primary | ICD-10-CM

## 2018-08-08 DIAGNOSIS — Z3A.28 28 WEEKS GESTATION OF PREGNANCY: ICD-10-CM

## 2018-08-08 LAB
BASOPHILS # BLD AUTO: 0.04 THOUSANDS/ΜL (ref 0–0.1)
BASOPHILS NFR BLD AUTO: 0 % (ref 0–1)
EOSINOPHIL # BLD AUTO: 0.33 THOUSAND/ΜL (ref 0–0.61)
EOSINOPHIL NFR BLD AUTO: 3 % (ref 0–6)
ERYTHROCYTE [DISTWIDTH] IN BLOOD BY AUTOMATED COUNT: 14.6 % (ref 11.6–15.1)
GLUCOSE 1H P 50 G GLC PO SERPL-MCNC: 95 MG/DL
HCT VFR BLD AUTO: 38.3 % (ref 34.8–46.1)
HGB BLD-MCNC: 11.7 G/DL (ref 11.5–15.4)
IMM GRANULOCYTES # BLD AUTO: 0.15 THOUSAND/UL (ref 0–0.2)
IMM GRANULOCYTES NFR BLD AUTO: 1 % (ref 0–2)
LYMPHOCYTES # BLD AUTO: 2.3 THOUSANDS/ΜL (ref 0.6–4.47)
LYMPHOCYTES NFR BLD AUTO: 22 % (ref 14–44)
MCH RBC QN AUTO: 28.7 PG (ref 26.8–34.3)
MCHC RBC AUTO-ENTMCNC: 30.5 G/DL (ref 31.4–37.4)
MCV RBC AUTO: 94 FL (ref 82–98)
MONOCYTES # BLD AUTO: 0.56 THOUSAND/ΜL (ref 0.17–1.22)
MONOCYTES NFR BLD AUTO: 5 % (ref 4–12)
NEUTROPHILS # BLD AUTO: 6.99 THOUSANDS/ΜL (ref 1.85–7.62)
NEUTS SEG NFR BLD AUTO: 69 % (ref 43–75)
NRBC BLD AUTO-RTO: 0 /100 WBCS
PLATELET # BLD AUTO: 258 THOUSANDS/UL (ref 149–390)
PMV BLD AUTO: 10.1 FL (ref 8.9–12.7)
RBC # BLD AUTO: 4.08 MILLION/UL (ref 3.81–5.12)
WBC # BLD AUTO: 10.37 THOUSAND/UL (ref 4.31–10.16)

## 2018-08-08 PROCEDURE — 99213 OFFICE O/P EST LOW 20 MIN: CPT | Performed by: OBSTETRICS & GYNECOLOGY

## 2018-08-08 PROCEDURE — 82950 GLUCOSE TEST: CPT | Performed by: OBSTETRICS & GYNECOLOGY

## 2018-08-08 PROCEDURE — 85025 COMPLETE CBC W/AUTO DIFF WBC: CPT | Performed by: OBSTETRICS & GYNECOLOGY

## 2018-08-08 PROCEDURE — 36415 COLL VENOUS BLD VENIPUNCTURE: CPT

## 2018-08-08 NOTE — PROGRESS NOTES
Jimena Braswell is a 25y o  year old  at 28w2d for routine prenatal visit  GTT and CBC done today  RhoGam needed No  Tdap needed Yes - give at next visit  [address]  28 week booklet and birth plan given today     Will schedule for [facility] 10/22/18

## 2018-08-20 ENCOUNTER — ROUTINE PRENATAL (OUTPATIENT)
Dept: OBGYN CLINIC | Facility: MEDICAL CENTER | Age: 28
End: 2018-08-20
Payer: COMMERCIAL

## 2018-08-20 VITALS — SYSTOLIC BLOOD PRESSURE: 112 MMHG | DIASTOLIC BLOOD PRESSURE: 68 MMHG | BODY MASS INDEX: 33.03 KG/M2 | WEIGHT: 217.2 LBS

## 2018-08-20 DIAGNOSIS — Z34.93 THIRD TRIMESTER PREGNANCY: Primary | ICD-10-CM

## 2018-08-20 DIAGNOSIS — O34.219 H/O CESAREAN SECTION COMPLICATING PREGNANCY: ICD-10-CM

## 2018-08-20 DIAGNOSIS — Z23 NEED FOR TDAP VACCINATION: ICD-10-CM

## 2018-08-20 DIAGNOSIS — I47.1 SVT (SUPRAVENTRICULAR TACHYCARDIA) (HCC): ICD-10-CM

## 2018-08-20 PROCEDURE — 99213 OFFICE O/P EST LOW 20 MIN: CPT | Performed by: OBSTETRICS & GYNECOLOGY

## 2018-08-20 PROCEDURE — 90472 IMMUNIZATION ADMIN EACH ADD: CPT | Performed by: OBSTETRICS & GYNECOLOGY

## 2018-08-20 PROCEDURE — 90715 TDAP VACCINE 7 YRS/> IM: CPT | Performed by: OBSTETRICS & GYNECOLOGY

## 2018-08-20 NOTE — PROGRESS NOTES
Narayan Umanzor is a 25y o  year old  at 30w0d for routine prenatal visit    + FM, no vaginal bleeding, contractions, or LOF  Complaints: Yes - pelvic pain and lower back pain  / supportive measures reviewed  Most recent ultrasound and labs reviewed    Tdap given today   Repeat section with Dr STANFORD on 10/22 scheduled   Hx of SVT with loop recorder in place - follow up with cards on    We discussed referral for anesthesia evaluation given SVT and also states had a lot of problems previous section with side effects from anesthesia - also interested in consult

## 2018-08-23 ENCOUNTER — TELEPHONE (OUTPATIENT)
Dept: OBGYN CLINIC | Facility: MEDICAL CENTER | Age: 28
End: 2018-08-23

## 2018-08-23 DIAGNOSIS — Z3A.30 30 WEEKS GESTATION OF PREGNANCY: Primary | ICD-10-CM

## 2018-08-29 ENCOUNTER — PROCEDURE VISIT (OUTPATIENT)
Dept: CARDIOLOGY CLINIC | Facility: CLINIC | Age: 28
End: 2018-08-29
Payer: COMMERCIAL

## 2018-08-29 ENCOUNTER — OFFICE VISIT (OUTPATIENT)
Dept: CARDIOLOGY CLINIC | Facility: CLINIC | Age: 28
End: 2018-08-29
Payer: COMMERCIAL

## 2018-08-29 VITALS
WEIGHT: 216.5 LBS | HEART RATE: 89 BPM | DIASTOLIC BLOOD PRESSURE: 60 MMHG | SYSTOLIC BLOOD PRESSURE: 100 MMHG | BODY MASS INDEX: 32.81 KG/M2 | RESPIRATION RATE: 16 BRPM | HEIGHT: 68 IN

## 2018-08-29 DIAGNOSIS — R00.2 PALPITATIONS: ICD-10-CM

## 2018-08-29 DIAGNOSIS — Z3A.28 28 WEEKS GESTATION OF PREGNANCY: ICD-10-CM

## 2018-08-29 DIAGNOSIS — I47.1 SVT (SUPRAVENTRICULAR TACHYCARDIA) (HCC): Primary | ICD-10-CM

## 2018-08-29 PROCEDURE — 93000 ELECTROCARDIOGRAM COMPLETE: CPT | Performed by: PHYSICIAN ASSISTANT

## 2018-08-29 PROCEDURE — 99214 OFFICE O/P EST MOD 30 MIN: CPT | Performed by: PHYSICIAN ASSISTANT

## 2018-08-29 NOTE — PROGRESS NOTES
Cardiology Follow Up    Jesica Gutierrez  1990  [id number]  HEART & VASCULAR [facility]  [address] [facility] [place]  [address]        Assessment/Plan     1  SVT (supraventricular tachycardia) (HCC)  POCT ECG    Holter monitor - 48 hour    Echo complete with contrast if indicated   2  28 weeks gestation of pregnancy     3  Palpitations         ASSESSMENT:  1  SVT, with prior attempted ablation 01/2016   A )  Per report, an atrial tachycardia was mapped to a location near the AV node, however the procedure was aborted due to concerns of AV lorraine injury   B )  Previously tried on multiple medications, including flecainide, but has not been on any medications recently   C )  True Fit Reveal loop recorder in situ  2  History of normal LV systolic function per echo 01/2016   3  Nonischemic exercise stress echo 3/2016  4   32 weeks pregnant    DISCUSSION/SUMMARY:  1   Palpitations - she reports daily episodes of heart racing, diaphoresis, dizziness, nausea, and presyncope  I personally interrogated her loop recorder today, and there have been no saved episodes since 2017  She has a patient activator, but she has not used it during her recent episodes  To be thorough, I will order a 48 hour Holter monitor to try and capture one of her episodes to see if we can correlate her symptoms with an arrhythmia  In the meantime, I have asked her to also use her patient activator and contact our office after sending a remote transmission so that this can be reviewed as well  2  SVT with prior attempted ablation - she states that since her gallbladder was removed 07/2017 she had no recurrent arrhythmias and no sustained episodes up until her recent symptoms began 2 weeks ago  She has sought several other opinions regarding her arrhythmia, and she reports being told that if her symptoms persisted she would require a repeat ablation    For now, no sustained arrhythmias have been documented, and we will continue to monitor  3   Current pregnancy - she has a history of normal echocardiogram 01/2016, however given her recent change in symptoms which have not occurred during prior pregnancies, I will order a repeat echocardiogram to rule out a cardiomyopathy or other structural changes  I will follow up with her in 2-3 weeks to review the findings of these studies  I will also await any remote transmissions from her loop recorder if/when she uses her patient activator  Otherwise, she will contact us sooner with any questions, concerns, or changes in symptoms  She was also seen by Dr Jv Jiménez today, who felt her symptoms were likely due to hormonal changes from her pregnancy and that they will hopefully subside after she delivers  He is in agreement with the above plan  History of Present Illness     HPI/INTERVAL HISTORY: Enio Guerrero is a 25 y o  female with a history of SVT, structurally normal heart, and nonischemic exercise stress echocardiogram   She is currently 32 weeks pregnant  She has followed with Dr Jv Jiménez in the past, and her physician advised her to follow up with us again given a recent change in symptoms  She states that approximately over the past month she has been experiencing the son onset of sweating, dizziness, nausea, headache, the feeling as though her heart is racing, and being unable to catch her breath  He is sometimes associated with presyncope, but she denies associated syncope  She feels these episodes are slightly different than her episodes of SVT that she has experienced in the past   She states she has not had similar episodes during her previous 2 pregnancies  They last for several minutes, and she is unsure whether they stop abruptly or if they gradually improve  She states vagal maneuvers have not historically helped, and she typically sits down and takes deep breaths until her symptoms improve    She states her episodes are now happening more frequently, and at this point are happening a few times a day  She has a loop recorder in place, but has not yet used her patient activator when her symptoms are occurring  Of note, she had a prior SVT ablation in 2016  Per reports, an atrial tachycardia was mapped near the AV node, and the ablation was not completed so as to not risk injury to her AV node  She states that she has had several opinions regarding her arrhythmia, including her she an [facility], and everyone has advised that she may eventually need a repeat ablation  She however states that since her gallbladder was removed 07/2017, her symptoms dramatically improved and she had no further episodes of palpitations or sustained arrhythmias until her recent episodes began approximately 1 month ago  While she denies chest pain, she does admit to shortness of breath with exertion or climbing steps as well as lower extremity edema which is attributed to her pregnancy  Review of Systems  ROS as noted above, otherwise 12 point review of systems was performed and is negative  Historical Information   Social History     Social History    Marital status: /Civil Union     Spouse name: N/A    Number of children: 2    Years of education: N/A     Occupational History    Not on file       Social History Main Topics    Smoking status: Never Smoker    Smokeless tobacco: Never Used    Alcohol use No    Drug use: No    Sexual activity: Yes     Partners: Male     Birth control/ protection: None     Other Topics Concern    Not on file     Social History Narrative    Exercises 3 times weekly    Pets: Dog     Past Medical History:   Diagnosis Date    Bicornate uterus     noted in previous ultrasound report    Liver disease     has spots on her liver    Liver spots     Migraine     history of    Miscarriage     sab x1    Supraventricular tachycardia ([facility] [address] ) 09/01/2016    SVT (supraventricular tachycardia) [facility])     last episode 2017 with 45 minute syncope, loop monitor implanted 2016    Urinary tract infection     infrequent UTI    Varicella     positive disease     Past Surgical History:   Procedure Laterality Date    CARDIAC ELECTROPHYSIOLOGY MAPPING AND ABLATION  2016    CARDIAC LOOP RECORDER       SECTION      x2    CHOLECYSTECTOMY  2017    Lap Eleanor    CYST REMOVAL      TONSILECTOMY AND ADNOIDECTOMY       History   Alcohol Use No     History   Drug Use No     History   Smoking Status    Never Smoker   Smokeless Tobacco    Never Used     Family History   Problem Relation Age of Onset    Arthritis Mother     Deafness Mother     Infertility Mother    [de-identified] / Mardelle Cruz Mother     Breast cancer Maternal Grandmother         under 39years old    Diabetes Other     No Known Problems Father     Lymphoma Paternal Grandfather     Valvular heart disease Paternal Grandfather     Seizures Sister        Meds/Allergies       Current Outpatient Prescriptions:     doxylamine (UNISOM) 25 MG tablet, Take 12 5 mg by mouth every 8 (eight) hours as needed for sleep, Disp: , Rfl:     Prenatal Multivit-Min-Fe-FA (PRENATAL VITAMINS PO), Take by mouth, Disp: , Rfl:     pyridoxine (VITAMIN B6) 50 mg tablet, Take 50 mg by mouth daily at bedtime, Disp: , Rfl:     Allergies   Allergen Reactions    Pollen Extract        Objective   Vitals: Blood pressure 100/60, pulse 89, resp  rate 16, height 5' 8" (1 727 m), weight 98 2 kg (216 lb 8 oz), last menstrual period 2018          Physical Exam  GEN: NAD, alert and oriented, well appearing  SKIN: dry without significant lesions or rashes  HEENT: NCAT, PERRL, EOMs intact  NECK: No JVD appreciated  CARDIOVASCULAR: RRR, no ectopy noted, normal S1, S2 without murmurs, rubs, or gallops appreciated  LUNGS: Clear to auscultation bilaterally without wheezes, rhonchi, or rales  ABDOMEN: + uterus  EXTREMITIES/VASCULAR: perfused without clubbing, cyanosis; mild nonpitting LE edema b/l  PSYCH: Normal mood and affect  NEURO: CN ll-Xll grossly intact        Labs:  Clinical Support on 08/08/2018   Component Date Value    Glucose 08/08/2018 95     WBC 08/08/2018 10 37*    RBC 08/08/2018 4 08     Hemoglobin 08/08/2018 11 7     Hematocrit 08/08/2018 38 3     MCV 08/08/2018 94     MCH 08/08/2018 28 7     MCHC 08/08/2018 30 5*    RDW 08/08/2018 14 6     MPV 08/08/2018 10 1     Platelets 66/26/9002 258     nRBC 08/08/2018 0     Neutrophils Relative 08/08/2018 69     Immat GRANS % 08/08/2018 1     Lymphocytes Relative 08/08/2018 22     Monocytes Relative 08/08/2018 5     Eosinophils Relative 08/08/2018 3     Basophils Relative 08/08/2018 0     Neutrophils Absolute 08/08/2018 6 99     Immature Grans Absolute 08/08/2018 0 15     Lymphocytes Absolute 08/08/2018 2 30     Monocytes Absolute 08/08/2018 0 56     Eosinophils Absolute 08/08/2018 0 33     Basophils Absolute 08/08/2018 0 04    Orders Only on 05/18/2018   Component Date Value    Down Syndrome Interpreta* 05/18/2018 SEE NOTE     Risk for ONTD 05/18/2018 <1:5000     Age Risk Down Syndrome 05/18/2018 1:730     BRIA Down Syndrome Risk 05/18/2018 1:4500     T18 Risk 05/18/2018 <1:5000     Calculated Gestational A* 05/18/2018 17 0     AFP 05/18/2018 18 4     MSAFP Mom 05/18/2018 0 58     HCG, Quantitative 05/18/2018 23 9     hCG MoM 05/18/2018 0 97     Estriol, Free 05/18/2018 0 63     Estriol MoM 05/18/2018 0 69     Inhibin A 05/18/2018 76     Inhibin A MoM 05/18/2018 0 51     DONTRELL-A Value 05/18/2018 827 3     DONTRELL-A MoM 05/18/2018 1 19     Nuchal Translucency MoM 05/18/2018 1 15     Referring Physician Name 05/18/2018 Jamin Wong Referring Physician Phone 05/18/2018 [phone]     Referring Physician NPI 05/18/2018 [id number]     Specimen # from Part 1 05/18/2018 [id number]     Date of Birth [birth year]     Collected On 05/18/2018 05/18/2018  Maternal Weight 05/18/2018 190     Estimated Delivery Date 05/18/2018 10/27/2018     Nuchal Translucency (NT) 05/18/2018 1 7     [place] Rump Length 05/18/2018 66     Ultrasound Date 05/18/2018 04/18/2018     Nasal Bone 05/18/2018 PRESENT     Mother's Ethnic Origin 05/18/2018      Insulin Dep  Diabetic 05/18/2018 NO     Repeat Specimen 05/18/2018 NOT GIVEN     Number of Fetuses 05/18/2018 1     Brief History (NTD) 05/18/2018 NO     SL AMB CIGARETTE SMOKER 05/18/2018 NO     Twin B Nasal Bone 05/18/2018 NOT GIVEN    Orders Only on 04/18/2018   Component Date Value    Down Syndrome Interpreta* 04/18/2018 SEE NOTE     Age Risk Down Syndrome 04/18/2018 1:550     BRIA Down Syndrome Risk 04/18/2018 1:4500     T18 Risk 04/18/2018 <1:5000     Calculated Gestational A* 04/18/2018 12 7     DONTRELL-A Value 04/18/2018 827 3     DONTRELL-A MoM 04/18/2018 1 19     HCG, Quantitative 04/18/2018 85 6     hCG MoM 04/18/2018 1 22     Nuchal Translucency MoM 04/18/2018 1 15     Referring Physician Name 04/18/2018 Lit Hall Referring Physician Phone 04/18/2018 [phone]     Referring Physician NPI 04/18/2018 [id number]     Date of Birth [birth year]     Collected On 04/18/2018 04/18/2018     Maternal Weight 04/18/2018 190     Estimated Delivery Date 04/18/2018 10/27/2018     Gestat  Age Based On 04/18/2018 ULTRASOUND     Race 04/18/2018      Number of Fetuses 04/18/2018 1     Insulin Dep   Diabetic 04/18/2018 NO     Repeat Specimen 04/18/2018 NOT GIVEN     Hx Of Neural Tube Defects 04/18/2018 NO     Prev Pregnancy Down Synd 04/18/2018 NO     Donor Egg 04/18/2018 NO     Donor Age: Egg Retrieval 04/18/2018 NOT GIVEN     SL AMB CIGARETTE SMOKER 04/18/2018 NO     Ultrasound Date 04/18/2018 NOT GIVEN     Ultrasonographer's Name 04/18/2018 NAIDA KAT     Sonographer ID 04/18/2018 [id number]     NTQR Location ID# 04/18/2018 NOT GIVEN     NTQR Reading Phys ID# 04/18/2018 [id number]     Sonographer ID 04/18/2018 NOT GIVEN     [place] Rump Length 04/18/2018 66     Nuchal Translucency (NT) 04/18/2018 1 7     Nasal Bone 04/18/2018 PRESENT     If Twins 04/18/2018 NOT GIVEN     Crown Rump Length Twin B 04/18/2018 NOT GIVEN     NT Twin B 04/18/2018 NOT GIVEN     Twin B Nasal Bone 04/18/2018 NOT GIVEN    Initial Prenatal on 04/05/2018   Component Date Value    N gonorrhoeae, DNA Probe 04/05/2018 N  gonorrhoeae Amplified DNA Negative     Chlamydia, DNA Probe 04/05/2018 C  trachomatis Amplified DNA Negative    Initial Prenatal on 03/28/2018   Component Date Value    HIV-1/HIV-2 Ab 03/28/2018 Non-Reactive     Color, UA 03/28/2018 Yellow     Clarity, UA 03/28/2018 Cloudy     Specific Gravity, UA 03/28/2018 1 019     pH, UA 03/28/2018 7 5     Leukocytes, UA 03/28/2018 Negative     Nitrite, UA 03/28/2018 Negative     Protein, UA 03/28/2018 Negative     Glucose, UA 03/28/2018 Negative     Ketones, UA 03/28/2018 Negative     Urobilinogen, UA 03/28/2018 0 2     Bilirubin, UA 03/28/2018 Negative     Blood, UA 03/28/2018 Negative     Urine Culture 03/28/2018 <10,000 cfu/ml      Hepatitis B Surface Ag 03/28/2018 Non-reactive     WBC 03/28/2018 9 01     RBC 03/28/2018 4 85     Hemoglobin 03/28/2018 14 0     Hematocrit 03/28/2018 41 6     MCV 03/28/2018 86     MCH 03/28/2018 28 9     MCHC 03/28/2018 33 7     RDW 03/28/2018 13 3     MPV 03/28/2018 9 9     Platelets 86/49/2252 305     nRBC 03/28/2018 0     Neutrophils Relative 03/28/2018 59     Lymphocytes Relative 03/28/2018 31     Monocytes Relative 03/28/2018 6     Eosinophils Relative 03/28/2018 4     Basophils Relative 03/28/2018 0     Neutrophils Absolute 03/28/2018 5 32     Lymphocytes Absolute 03/28/2018 2 75     Monocytes Absolute 03/28/2018 0 51     Eosinophils Absolute 03/28/2018 0 39     Basophils Absolute 03/28/2018 0 02     Rubella IgG Quant 03/28/2018 146 0     ABO Grouping 03/28/2018 O  Rh Factor 03/28/2018 Positive     Antibody Screen 03/28/2018 Negative     Specimen Expiration Date 03/28/2018 [id number]     RPR 03/28/2018 Non-Reactive    Clinical Support on 03/22/2018   Component Date Value    Progesterone 03/22/2018 21 40     HCG, Quant 03/22/2018 087360*    ABO Grouping 03/22/2018 O     Rh Factor 03/22/2018 Positive     Antibody Screen 03/22/2018 Negative     Specimen Expiration Date 03/22/2018 [id number]        Imaging: I have personally reviewed pertinent reports  ECHO 1/2016:       EXERCISE STRESS ECHO 3/2016:    IMPRESSIONS:  Normal study after maximal exercise without reproduction of symptoms  Left  ventricular systolic function was normal      SUMMARY     STRESS RESULTS:  Duration of exercise was 9 min and 0 sec  Maximal work rate was 10 1 METs  Maximal heart rate during stress was 176 bpm ( 91 % of maximal predicted heart  rate)  Target heart rate was achieved  There was no chest pain during stress      ECG CONCLUSIONS:  The stress ECG was negative for ischemia      BASELINE:  There were no regional wall motion abnormalities  Estimated left ventricular ejection fraction was 60 %       PEAK STRESS:  There were no regional wall motion abnormalities      ECHO CONCLUSIONS:  There was no echocardiographic evidence for stress-induced ischemia      EKG:  Normal sinus rhythm, heart rate 89 beats per minute, normal axis, no acute ischemic changes noted, overall normal EKG

## 2018-08-30 ENCOUNTER — ULTRASOUND (OUTPATIENT)
Dept: PERINATAL CARE | Facility: CLINIC | Age: 28
End: 2018-08-30
Payer: COMMERCIAL

## 2018-08-30 ENCOUNTER — HOSPITAL ENCOUNTER (OUTPATIENT)
Dept: NON INVASIVE DIAGNOSTICS | Facility: HOSPITAL | Age: 28
Discharge: HOME | End: 2018-08-30
Payer: COMMERCIAL

## 2018-08-30 VITALS
DIASTOLIC BLOOD PRESSURE: 65 MMHG | BODY MASS INDEX: 32.98 KG/M2 | HEIGHT: 68 IN | HEART RATE: 88 BPM | SYSTOLIC BLOOD PRESSURE: 97 MMHG | WEIGHT: 217.6 LBS

## 2018-08-30 DIAGNOSIS — O26.843 SIGNIFICANT DISCREPANCY BETWEEN UTERINE SIZE AND CLINICAL DATES, ANTEPARTUM, THIRD TRIMESTER: ICD-10-CM

## 2018-08-30 DIAGNOSIS — I47.1 SVT (SUPRAVENTRICULAR TACHYCARDIA) (HCC): ICD-10-CM

## 2018-08-30 DIAGNOSIS — Z3A.31 31 WEEKS GESTATION OF PREGNANCY: ICD-10-CM

## 2018-08-30 DIAGNOSIS — O34.219 H/O CESAREAN SECTION COMPLICATING PREGNANCY: ICD-10-CM

## 2018-08-30 DIAGNOSIS — O99.213 OBESITY AFFECTING PREGNANCY IN THIRD TRIMESTER: Primary | ICD-10-CM

## 2018-08-30 DIAGNOSIS — O40.3XX0 POLYHYDRAMNIOS IN SINGLETON PREGNANCY IN THIRD TRIMESTER: ICD-10-CM

## 2018-08-30 PROCEDURE — 76816 OB US FOLLOW-UP PER FETUS: CPT | Performed by: OBSTETRICS & GYNECOLOGY

## 2018-08-30 PROCEDURE — 93306 TTE W/DOPPLER COMPLETE: CPT | Performed by: INTERNAL MEDICINE

## 2018-08-30 PROCEDURE — 93306 TTE W/DOPPLER COMPLETE: CPT

## 2018-08-30 PROCEDURE — 99212 OFFICE O/P EST SF 10 MIN: CPT | Performed by: OBSTETRICS & GYNECOLOGY

## 2018-08-30 NOTE — PROGRESS NOTES
Please refer to the [facility] ultrasound report in Ob Procedures for additional information regarding the visit to the [address] [place] [address] today  Parent(s)

## 2018-08-30 NOTE — LETTER
August 30, 2018     Keron Jose MD  [address]    Patient: Leno Hale   YOB: 1990   Date of Visit: 8/30/2018       Dear Dr Rubin [place]:    Thank you for referring Izabella Chatterjee to me for evaluation  Below are my notes for this consultation  If you have questions, please do not hesitate to call me  I look forward to following your patient along with you  Sincerely,        Aina Ribeiro MD        CC: No Recipients  Ania Ribeiro MD  8/30/2018  9:09 AM  Sign at close encounter  Please refer to the [facility] ultrasound report in Ob Procedures for additional information regarding [facility]  today

## 2018-08-31 ENCOUNTER — TELEPHONE (OUTPATIENT)
Dept: CARDIOLOGY CLINIC | Facility: CLINIC | Age: 28
End: 2018-08-31

## 2018-08-31 NOTE — TELEPHONE ENCOUNTER
Pt stopped into the office to ask for echo results, done yesterday  Please advise  Thanks  Per Willian Pham, echo is completely normal     Pt notified

## 2018-09-04 ENCOUNTER — HOSPITAL ENCOUNTER (OUTPATIENT)
Dept: NON INVASIVE DIAGNOSTICS | Facility: HOSPITAL | Age: 28
Discharge: HOME | End: 2018-09-04
Payer: COMMERCIAL

## 2018-09-04 DIAGNOSIS — I47.1 SVT (SUPRAVENTRICULAR TACHYCARDIA) (HCC): ICD-10-CM

## 2018-09-04 PROCEDURE — 93225 XTRNL ECG REC<48 HRS REC: CPT

## 2018-09-04 PROCEDURE — 93226 XTRNL ECG REC<48 HR SCAN A/R: CPT

## 2018-09-04 PROCEDURE — 93224 XTRNL ECG REC UP TO 48 HRS: CPT | Performed by: PHYSICIAN ASSISTANT

## 2018-09-05 ENCOUNTER — ROUTINE PRENATAL (OUTPATIENT)
Dept: OBGYN CLINIC | Facility: MEDICAL CENTER | Age: 28
End: 2018-09-05
Payer: COMMERCIAL

## 2018-09-05 VITALS — SYSTOLIC BLOOD PRESSURE: 104 MMHG | WEIGHT: 217 LBS | DIASTOLIC BLOOD PRESSURE: 62 MMHG | BODY MASS INDEX: 32.99 KG/M2

## 2018-09-05 DIAGNOSIS — Z3A.32 32 WEEKS GESTATION OF PREGNANCY: Primary | ICD-10-CM

## 2018-09-05 DIAGNOSIS — M89.9 PUBIC BONE PAIN: ICD-10-CM

## 2018-09-05 DIAGNOSIS — M79.604 LOW BACK PAIN RADIATING TO RIGHT LOWER EXTREMITY: ICD-10-CM

## 2018-09-05 DIAGNOSIS — M54.50 LOW BACK PAIN RADIATING TO RIGHT LOWER EXTREMITY: ICD-10-CM

## 2018-09-05 PROCEDURE — 99213 OFFICE O/P EST LOW 20 MIN: CPT | Performed by: NURSE PRACTITIONER

## 2018-09-05 NOTE — PROGRESS NOTES
Tomy Villarreal is a 25y o  year old  at [place] [address] for routine prenatal visit    + FM, no vaginal bleeding, contractions, or LOF  Complaints: Yes , low back pain w r leg radiculopathy, and pelvic pressure with walking  States private PTs didn't accept her ins  Gave referral to [facility]  Most recent ultrasound and labs reviewed  Has anesthesia appt on   Has c/s on 10/22  Fkc, ptl precautions reviewed

## 2018-09-06 ENCOUNTER — EVALUATION (OUTPATIENT)
Dept: PHYSICAL THERAPY | Facility: CLINIC | Age: 28
End: 2018-09-06
Payer: COMMERCIAL

## 2018-09-06 DIAGNOSIS — M89.9 PUBIC BONE PAIN: ICD-10-CM

## 2018-09-06 DIAGNOSIS — M79.604 LOW BACK PAIN RADIATING TO RIGHT LOWER EXTREMITY: Primary | ICD-10-CM

## 2018-09-06 DIAGNOSIS — M54.50 LOW BACK PAIN RADIATING TO RIGHT LOWER EXTREMITY: Primary | ICD-10-CM

## 2018-09-06 PROCEDURE — 97112 NEUROMUSCULAR REEDUCATION: CPT | Performed by: PHYSICAL THERAPIST

## 2018-09-06 PROCEDURE — 97162 PT EVAL MOD COMPLEX 30 MIN: CPT | Performed by: PHYSICAL THERAPIST

## 2018-09-06 PROCEDURE — G8978 MOBILITY CURRENT STATUS: HCPCS | Performed by: PHYSICAL THERAPIST

## 2018-09-06 PROCEDURE — G8979 MOBILITY GOAL STATUS: HCPCS | Performed by: PHYSICAL THERAPIST

## 2018-09-06 NOTE — PROGRESS NOTES
PT Evaluation     Today's date: 2018  Patient name: Eemlina Liz  : 1990  MRN: [id number]  Referring provider: MARTINEZ Boykin  Dx:   Encounter Diagnosis     ICD-10-CM    1  Low back pain radiating to right lower extremity M54 5 Ambulatory referral to Physical Therapy   2  Pubic bone pain M89 9 Ambulatory referral to Physical Therapy                  Assessment  Impairments: abnormal gait, abnormal or restricted ROM, activity intolerance, impaired physical strength, lacks appropriate home exercise program, pain with function, weight-bearing intolerance, poor posture  and poor body mechanics    Assessment details: Emelina Liz is a 25 y o  female who presents to physical therapy with Low back pain radiating to right lower extremity and pubic bone pain  Pt demonstrates R sciatic nerve tension and poor core and pelvic stability bilaterally  Pt has antalgic gait and reports constant pain with all mobility activities, worse with bed mobility, car transfers, and walking  Pt has deficits in bilateral hip strength and R hip flexion ROM  Emelina Liz would benefit from formal physical therapy to address impairments as detailed, decrease pain, and restore maximal level of function for all home, work, and mobility tasks  Thank you for this referral     Understanding of Dx/Px/POC: good   Prognosis: good    Goals  Short-term goals:  1  Pt will decrease pain by 1-2 points within 4 weeks  2  Pt will improve ROM by 5-10 degrees within 4 weeks  3  Pt will improve strength by 1/2 grade within 4 weeks  4  Pt will improve physical FS score by points by discharge  Long-term goals:  1  Pt will demonstrate independence with HEP by discharge  2  Pt will return to all home tasks with good body mechanics and pain <3/10 by discharge  3  Pt will perform all transfers with improved stability and without assistance by discharge  4  Pt will roll in bed with pain <5/10 by discharge      Plan  Patient would benefit from: PT eval and skilled PT  Planned modality interventions: cryotherapy and thermotherapy: hydrocollator packs  Planned therapy interventions: joint mobilization, manual therapy, neuromuscular re-education, patient education, strengthening, stretching, therapeutic exercise, flexibility, functional ROM exercises, home exercise program, abdominal trunk stabilization, activity modification, body mechanics training, postural training and therapeutic activities  Frequency: 2x week  Duration in weeks: 4  Treatment plan discussed with: patient        Subjective Evaluation    History of Present Illness  Mechanism of injury: Pt is 32 weeks pregnant with her 3rd baby  Pt reports that pain starts R side of low back, wraps around R hip, and into R pelvis  Pt reports some tingling into R leg, restless leg bilaterally  Pt denies saddle anesthesia  Pt denies changes in bowel/bladder function  Pt had 2 prior C-sections, is scheduled for  10/22/18  Pt notes that pain started 1-2 months ago, denies trauma, falls, precipitating incident  Pt's children are 4 and 9  Pt has pain with sitting, walking, bending, car transfers, vacuuming, twisting motions, and stair climbing  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  Pt reports that pain is constant, worse at the end of the day  Pt denies relieving factors  Pt tries to limit work to 6 hrs/day and elevate feet  Pt denies popping, clicking, shifting at pubic symphysis  Pt has difficulty with bed mobility and must lift her leg to get into the car  Pt had tried SI belt but states that it did not make much difference for her    Pain  At best pain ratin  At worst pain ratin  Location: low back and pubic pain  Quality: "like a pole is jamming into me"  Progression: worsening    Social Support  Stairs in house: yes   Lives with: spouse and young children    Employment status: working    Diagnostic Tests  No diagnostic tests performed  Patient Goals  Patient goals for therapy: decreased pain  Patient goal: alleviate pain to walk without discomfort        Objective     Special Questions  Negative for bladder dysfunction, bowel dysfunction and saddle (S4) numbness    Static Posture     Comments  R innominate posteriorly rotated, tenderness over R QL and lumbar PSM    Neurological Testing     Sensation     Lumbar   Left   Intact: light touch    Right   Intact: light touch    Reflexes   Left   Patellar (L4): trace (1+)  Achilles (S1): normal (2+)  Clonus sign: negative    Right   Patellar (L4): trace (1+)  Achilles (S1): normal (2+)  Clonus sign: negative    Active Range of Motion     Lumbar   Flexion: 74 degrees   Extension: 30 (pain in R groin and pubis) degrees with pain  Left lateral flexion: 28 degrees   Right lateral flexion: 18 (stabbing pain) degrees with pain  Left rotation: Active left lumbar rotation: 100%   Right rotation: Active right lumbar rotation: 100%     Strength/Myotome Testing     Left Hip   Planes of Motion   Flexion: 3 (compensates with R trunk lean)  External rotation: 5  Internal rotation: 5    Right Hip   Planes of Motion   Flexion: 3 (increased pain, trunk compensation)  External rotation: 4+ (pain)  Internal rotation: 5    Left Knee   Flexion: 5  Extension: 5    Right Knee   Flexion: 4 (pain)  Extension: 5    Left Ankle/Foot   Dorsiflexion: 5  Plantar flexion: 5  Inversion: 5  Eversion: 5    Right Ankle/Foot   Dorsiflexion: 5  Plantar flexion: 5  Inversion: 5  Eversion: 5    Tests       Thoracic   Positive slump  Lumbar   Positive slumped  Left   Negative crossed SLR and passive SLR  Right   Positive passive SLR  Negative crossed SLR  Right Pelvic Girdle/Sacrum   Positive: sacrum compression       Additional Tests Details  + RUFUS R, pain with flexion of R hip to 90 deg  ASLR better with SI gapping  Tender over R proximal TFL and greater trochanter, pain over R hip flexor, pain over R pubis, more painful with spring    Ambulation     Observational Gait   Gait: antalgic and asymmetric   Decreased walking speed and stride length  Base of support: increased    Additional Observational Gait Details  Pt shifts off of R side    Functional Assessment   Squat   Pain, sitting toward left side and right valgus         Flowsheet Rows      Most Recent Value   PT/OT G-Codes   Current Score  50   Projected Score  0          Precautions: 3rd trimester pregnancy, headaches    Daily Treatment Diary     Manual  9/6/18            BP pre tx 118/54 mmHg            MET abd/ad 5"x3 ea            R hip PROM PRN nv            R piriformis TPR PRN nv                             Exercise Diary  9/6/18            TA + hip add in reclined 3"x10            Reclined sciatic n glide w towel 3 pumps, 5x ea            Side stepping nv            TA in reclined nv            TA + hip TB abd nv            TA + BKFO nv            TA + LTR nv            Pball APT/PPT nv            pball lumbar flexion nv                                                                                                                                                               Modalities              none

## 2018-09-11 ENCOUNTER — ROUTINE PRENATAL (OUTPATIENT)
Dept: OBGYN CLINIC | Facility: MEDICAL CENTER | Age: 28
End: 2018-09-11
Payer: COMMERCIAL

## 2018-09-11 VITALS — WEIGHT: 219 LBS | DIASTOLIC BLOOD PRESSURE: 60 MMHG | BODY MASS INDEX: 33.3 KG/M2 | SYSTOLIC BLOOD PRESSURE: 100 MMHG

## 2018-09-11 DIAGNOSIS — Z34.93 THIRD TRIMESTER PREGNANCY: Primary | ICD-10-CM

## 2018-09-11 PROCEDURE — 99213 OFFICE O/P EST LOW 20 MIN: CPT | Performed by: NURSE PRACTITIONER

## 2018-09-11 NOTE — PROGRESS NOTES
Narayan Umanzor is a 25y o  year old  at 33w1d for routine prenatal visit    + FM, no vaginal bleeding, contractions, or LOF  Complaints: Yes + nausea and mild h a  Relieved with tylenol  Most recent ultrasound and labs reviewed  Has pbv scheduled  Has been scheduled for weekly appts, wants to continue this way  Fkc, ptl precautions reviewed

## 2018-09-12 ENCOUNTER — OFFICE VISIT (OUTPATIENT)
Dept: PHYSICAL THERAPY | Facility: CLINIC | Age: 28
End: 2018-09-12
Payer: COMMERCIAL

## 2018-09-12 DIAGNOSIS — M89.9 PUBIC BONE PAIN: ICD-10-CM

## 2018-09-12 DIAGNOSIS — M54.50 LOW BACK PAIN RADIATING TO RIGHT LOWER EXTREMITY: Primary | ICD-10-CM

## 2018-09-12 DIAGNOSIS — M79.604 LOW BACK PAIN RADIATING TO RIGHT LOWER EXTREMITY: Primary | ICD-10-CM

## 2018-09-12 PROCEDURE — 97112 NEUROMUSCULAR REEDUCATION: CPT | Performed by: PHYSICAL THERAPIST

## 2018-09-12 PROCEDURE — 97110 THERAPEUTIC EXERCISES: CPT | Performed by: PHYSICAL THERAPIST

## 2018-09-12 PROCEDURE — 97140 MANUAL THERAPY 1/> REGIONS: CPT | Performed by: PHYSICAL THERAPIST

## 2018-09-12 NOTE — PROGRESS NOTES
Daily Note     Today's date: 2018  Patient name: Silvia Perez  : 1990  MRN: [id number]  Referring provider: MARTINEZ Berg  Dx:   Encounter Diagnosis     ICD-10-CM    1  Low back pain radiating to right lower extremity M54 5    2  Pubic bone pain M89 9                   Subjective: Pt reports that pubic pain is ongoing, dull, and unchanged  Pt reports feeling better following therapy last session but had increased pain that night  Pt was unable to perform Hep for 2 days secondary to pain  Pt has not had issues with HEP after that time  Pt continues report 6/10 pain  Objective: See treatment diary below    Precautions: 3rd trimester pregnancy, headaches    Daily Treatment Diary     Manual  18 912/18           BP pre tx 118/54 mmHg 120/62 mmHg           MET abd/ad 5"x3 ea            R hip PROM PRN nv            R piriformis TPR PRN nv                             Exercise Diary  18           TA + hip add in reclined 3"x10 3"x15           Reclined sciatic n glide w towel 3 pumps, 5x ea 3 pumps, 5x ea           Side stepping nv 2 laps           TA in reclined nv 3"x10           TA + hip TB abd nv Org, 3"x15           TA + BKFO nv 10x ea           TA + LTR nv            Pball APT/PPT nv Blue 10x ea           pball lumbar flexion nv 10"x5           Seated sciatic n glide, 3 pumps  10x ea                                                                                                                                                 Modalities              none                                             Assessment: Tolerated treatment fairly well  Patient demonstrated fatigue post treatment and would benefit from continued PT Pt has greater ease with sidestepping to L compared to R  Pt has minimal ER and abduction of R hip prior to increase in pain  Pt had good tolerance to manuals with relief of R hip flexor tightness but ongoing pain over R SI and R QL   Spasm felt over distal extensors on R side  Discussed with pt using ice for 5-10 minutes over R low back at end of work shift to reduce inflammation at end of work day  Pt reports pain unchanged or slightly more at end of session  Plan: Continue per plan of care  Progress treatment as tolerated

## 2018-09-14 ENCOUNTER — OFFICE VISIT (OUTPATIENT)
Dept: PHYSICAL THERAPY | Facility: CLINIC | Age: 28
End: 2018-09-14
Payer: COMMERCIAL

## 2018-09-14 DIAGNOSIS — M54.50 LOW BACK PAIN RADIATING TO RIGHT LOWER EXTREMITY: Primary | ICD-10-CM

## 2018-09-14 DIAGNOSIS — M79.604 LOW BACK PAIN RADIATING TO RIGHT LOWER EXTREMITY: Primary | ICD-10-CM

## 2018-09-14 DIAGNOSIS — M89.9 PUBIC BONE PAIN: ICD-10-CM

## 2018-09-14 PROCEDURE — 97140 MANUAL THERAPY 1/> REGIONS: CPT | Performed by: PHYSICAL THERAPIST

## 2018-09-14 PROCEDURE — 97110 THERAPEUTIC EXERCISES: CPT | Performed by: PHYSICAL THERAPIST

## 2018-09-14 NOTE — PROGRESS NOTES
Daily Note     Today's date: 2018  Patient name: Esequiel Horowitz  : 1990  MRN: [id number]  Referring provider: MARTINEZ Ervin  Dx:   Encounter Diagnosis     ICD-10-CM    1  Low back pain radiating to right lower extremity M54 5    2  Pubic bone pain M89 9                   Subjective: Pt reports increased pain following last session and states that she had difficulty with finding a position of comfort for sleep  Pt notes feeling not as bad today, locates most pain over R lumbar paraspinals  Objective: See treatment diary below    Precautions: 3rd trimester pregnancy, headaches    Daily Treatment Diary     Manual  18 912/18 18          BP pre tx 118/54 mmHg 120/62 mmHg 115/60  mmHg          MET abd/ad 5"x3 ea  nv          R hip PROM PRN nv  nv          R piriformis TPR PRN nv  nv          R lumbar PSM STM in L sidelying over pillow   ED              Exercise Diary  18          TA + hip add in reclined 3"x10 3"x15 3"x15          Reclined sciatic n glide w towel 3 pumps, 5x ea 3 pumps, 5x ea np          Side stepping nv 2 laps 3 laps          TA in reclined nv 3"x10 3"x15          TA + hip TB abd nv Org, 3"x15 Grn, 3"x10          TA + BKFO nv 10x ea 10x ea          TA + LTR nv  np          Pball APT/PPT nv Blue 10x ea Np, p!          pball lumbar flexion nv 10"x5 10" x 5          Seated sciatic n glide, 3 pumps  10x ea 10x ea          Ab brace + mini squats   10x                                                                                                                                   Modalities              none                                             Assessment: Tolerated treatment fairly well  Patient demonstrated fatigue post treatment and would benefit from continued PT Pt had better tolerance in seated compared to reclined position today   Pt had good tolerance to initiation of STM in L sidelying and states that she feels some relief at end of session  Pt will assess status and treatment will be adjusted as needed at next session  Plan: Continue per plan of care  Progress treatment as tolerated

## 2018-09-17 ENCOUNTER — APPOINTMENT (OUTPATIENT)
Dept: PHYSICAL THERAPY | Facility: CLINIC | Age: 28
End: 2018-09-17
Payer: COMMERCIAL

## 2018-09-18 NOTE — PROGRESS NOTES
Cardiology Follow Up    Mabel Andersen  1990  [id number]  HEART & VASCULAR [facility]  [address] [facility] [place]  [address]        Assessment/Plan     1  Tachycardia  POCT ECG   2  SVT (supraventricular tachycardia) ([address] )     3  Third trimester pregnancy     4  Sinus tachycardia         ASSESSMENT:  1  SVT, with prior attempted ablation 2016   A )  Per report, an atrial tachycardia was mapped to a location near the AV node, however the procedure was aborted due to concerns of AV lorraine injury   B )  Previously tried on multiple medications, including flecainide, but has not been on any medications recently   C )  [employer] loop recorder in situ  2  History of normal LV systolic function per echo 2016   3  Nonischemic exercise stress echo 3/2016  4   ~35 weeks pregnant, scheduled for  10/22/2018    DISCUSSION/SUMMARY:  1   Palpitations - her recent Holter monitor showed normal sinus rhythm with periods of sinus tachycardia, no evidence of SVT, and no significant burden of ectopic beats  I thoroughly reviewed her loop recorder interrogation today  She did have 3 symptom activated episodes, only 2 EGMs were available to review  This showed sinus tachycardia, with clear P waves noted, no sustained SVT or other arrhythmias  Dr Triston Will also reviewed the strips, and reviewed the findings with the patient  We will continue to monitor, and I have asked her to continue to use her patient activator if she has further episodes and contact our office after sending a remote transmission so that it can be reviewed  2   Baseline sinus tachycardia -  She is concerned that her resting heart rate is higher than it typically was, or has been with her prior pregnancies  Dr Triston Will also reassured her about this, stating that this is a natural response to progesterone and should hopefully improve after she delivers    In the meantime, he recommended that she wear bilateral compression stockings which will hopefully help decrease her resting heart rate slightly  She will also continue to remain well hydrated  She has had multiple medication intolerances in the past, and I do not want to add something at this point  3  SVT with prior attempted ablation - she states that since her gallbladder was removed 2017 she had no recurrent arrhythmias and no sustained episodes up until her recent symptoms began 2 weeks ago  She has sought several other opinions regarding her arrhythmia, and she reports being told that if her symptoms persisted she would require a repeat ablation  For now, no sustained arrhythmias have been documented, and we will continue to monitor  4   Current pregnancy - she is scheduled for  10/22/2018  Her recent echocardiogram showed normal LV systolic function with no evidence of hypertrophy, cardiomyopathy, or other structural abnormalities  I reassured her regarding these findings  No further workup is necessary at this time  Dr Lupe Lester informed her that if she does have issues with arrhythmias, there are multiple medication options that can be given to her at the time of her delivery that would not harm her or her child  I am hopeful that she will have no recurrent arrhythmias in that setting, but EP can be contacted with any questions or concerns  She will follow up with us after she delivers, but I asked her to contact us in the meantime with any questions, concerns, or significant changes in symptoms  Dr Lupe Lester and I tried to reassure her that all recent findings have been normal  If she continues to have resting bradycardia even after delivery, we can then proceed with further workup for POTS at that time      History of Present Illness     HPI/INTERVAL HISTORY: Lianna Jain is a 25 y o  female with a history of SVT, structurally normal heart, and nonischemic exercise stress echocardiogram   She is currently 32 weeks pregnant  She has followed with Dr Sid Hernandez in the past, and I saw her several weeks ago due to increased palpitations  She reported the sudden onset of sweating, dizziness, nausea, headache, the feeling as though her heart was racing, as well as being unable to catch her breath  These episodes were sometimes associated with presyncope, but she denied associated syncope  She felt these episodes were slightly different than her episodes of SVT that she had experienced in the past   She did not report similar episodes during her previous 2 pregnancies  They last for several minutes, and she was unsure whether they stop abruptly or if they gradually improve  These episodes started occurring more frequently, as much as a few times a day  She has a loop recorder in place, but had not used her patient activator during symptoms  Of note, she had a prior SVT ablation in 2016  Per reports, an atrial tachycardia was mapped near the AV node, and the ablation was not completed so as to not risk injury to her AV node  She states that she has had several second opinions regarding her arrhythmia, including her she an [facility], and everyone has advised that she may eventually need a repeat ablation  She however states that since her gallbladder was removed 07/2017, her symptoms dramatically improved and she had no further episodes of palpitations or sustained arrhythmias until her recent episodes  While she denies chest pain, she does admit to shortness of breath with exertion or climbing steps as well as lower extremity edema which is attributed to her pregnancy  Today, she reports ongoing symptoms  She states last night she was unable to sleep because she feels her heart racing and feels as though she is experiencing a "rush adrenaline "  She states her resting heart rate usually is in the 60s, but recently has been higher up to the 120s    She is concerned about underlying coronary disease, and is anxious about her symptoms since she has 4 more weeks to go before her scheduled   She continues to experience palpitations associated with dizziness and lightheadedness, as well shortness of breath  At times she feels as if she is going to fall down, she typically sits down and drinks water to help with her symptoms  She admits to staying well hydrated  She denies episodes of syncope  When she previously wore her Holter monitor, she did not experience the symptoms and felt well  She has used her activator on several occasions with her recent symptoms, but states that by the time she gets her activator it may not have stored her arrhythmia  Review of Systems   ROS as noted above, otherwise 12 point review of systems was performed and is negative  Historical Information   Social History     Social History    Marital status: /Civil Union     Spouse name: N/A    Number of children: 2    Years of education: N/A     Occupational History    Not on file       Social History Main Topics    Smoking status: Never Smoker    Smokeless tobacco: Never Used    Alcohol use No    Drug use: No    Sexual activity: Yes     Partners: Male     Birth control/ protection: None     Other Topics Concern    Not on file     Social History Narrative    Exercises 3 times weekly    Pets: Dog     Past Medical History:   Diagnosis Date    Bicornate uterus     noted in previous ultrasound report    Liver disease     has spots on her liver    Liver spots     Migraine     history of    Miscarriage     sab x1    [facility]raventricular tachycardia ([facility] ) 2016    SVT (supraventricular tachycardia) ([facility] )     last episode 2017 with 45 minute syncope, loop monitor implanted 2016    Urinary tract infection     infrequent UTI    Varicella     positive disease     Past Surgical History:   Procedure Laterality Date    CARDIAC ELECTROPHYSIOLOGY MAPPING AND ABLATION  2016    CARDIAC LOOP RECORDER       SECTION      x2    CHOLECYSTECTOMY  2017    Lap Eleanor    CYST REMOVAL      TONSILECTOMY AND ADNOIDECTOMY       History   Alcohol Use No     History   Drug Use No     History   Smoking Status    Never Smoker   Smokeless Tobacco    Never Used     Family History   Problem Relation Age of Onset    Arthritis Mother     Deafness Mother     Infertility Mother    [de-identified] / [place] Mother     Breast cancer Maternal Grandmother         under 39years old    Diabetes Other     No Known Problems Father     Lymphoma Paternal Grandfather     Valvular heart disease Paternal Grandfather     Seizures Sister        Meds/Allergies       Current Outpatient Prescriptions:     doxylamine (UNISOM) 25 MG tablet, Take 12 5 mg by mouth every 8 (eight) hours as needed for sleep, Disp: , Rfl:     Prenatal Multivit-Min-Fe-FA (PRENATAL VITAMINS PO), Take by mouth, Disp: , Rfl:     pyridoxine (VITAMIN B6) 50 mg tablet, Take 50 mg by mouth daily at bedtime, Disp: , Rfl:     Allergies   Allergen Reactions    Pollen Extract Allergic Rhinitis       Objective   Vitals: Last menstrual period 2018          Physical Exam   GEN: NAD, alert and oriented, well appearing  SKIN: dry without significant lesions or rashes  HEENT: NCAT, PERRL, EOMs intact  NECK: No JVD appreciated  CARDIOVASCULAR: RRR, normal S1, S2 without murmurs, rubs, or gallops appreciated  LUNGS: Clear to auscultation bilaterally without wheezes, rhonchi, or rales  ABDOMEN: + uterus  EXTREMITIES/VASCULAR: perfused without clubbing, cyanosis, or edema b/l  PSYCH: slightly tearful and anxious  NEURO: CN ll-Xll grossly intact          Labs:  Clinical Support on 2018   Component Date Value    Glucose 2018 95     WBC 2018 10 37*    RBC 2018 4 08     Hemoglobin 2018 11 7     Hematocrit 2018 38 3     MCV 2018 94     MCH 2018 28 7     MCHC 2018 30 5*    RDW 2018 14 6     MPV 08/08/2018 10 1     Platelets 61/04/8875 258     nRBC 08/08/2018 0     Neutrophils Relative 08/08/2018 69     Immat GRANS % 08/08/2018 1     Lymphocytes Relative 08/08/2018 22     Monocytes Relative 08/08/2018 5     Eosinophils Relative 08/08/2018 3     Basophils Relative 08/08/2018 0     Neutrophils Absolute 08/08/2018 6 99     Immature Grans Absolute 08/08/2018 0 15     Lymphocytes Absolute 08/08/2018 2 30     Monocytes Absolute 08/08/2018 0 56     Eosinophils Absolute 08/08/2018 0 33     Basophils Absolute 08/08/2018 0 04    Orders Only on 05/18/2018   Component Date Value    Down Syndrome Interpreta* 05/18/2018 SEE NOTE     Risk for ONTD 05/18/2018 <1:5000     Age Risk Down Syndrome 05/18/2018 1:730     BRIA Down Syndrome Risk 05/18/2018 1:4500     T18 Risk 05/18/2018 <1:5000     Calculated Gestational A* 05/18/2018 17 0     AFP 05/18/2018 18 4     MSAFP Mom 05/18/2018 0 58     HCG, Quantitative 05/18/2018 23 9     hCG MoM 05/18/2018 0 97     Estriol, Free 05/18/2018 0 63     Estriol MoM 05/18/2018 0 69     Inhibin A 05/18/2018 76     Inhibin A MoM 05/18/2018 0 51     DONTRELL-A Value 05/18/2018 827 3     DONTRELL-A MoM 05/18/2018 1 19     Nuchal Translucency MoM 05/18/2018 1 15     Referring Physician Name 05/18/2018 Syed Morin Referring Physician Phone 05/18/2018 [phone]     Referring Physician NPI 05/18/2018 [id number]     Specimen # from Part 1 05/18/2018 [id number]     Date of Birth [birth year]     Collected On 05/18/2018 05/18/2018     Maternal Weight 05/18/2018 190     Estimated Delivery Date 05/18/2018 10/27/2018     Nuchal Translucency (NT) 05/18/2018 1 7     [place] Rump Length 05/18/2018 66     Ultrasound Date 05/18/2018 04/18/2018     Nasal Bone 05/18/2018 PRESENT     Mother's Ethnic Origin 05/18/2018      Insulin Dep   Diabetic 05/18/2018 NO     Repeat Specimen 05/18/2018 NOT GIVEN     Number of Fetuses 05/18/2018 1     Brief History (NTD) 05/18/2018 NO     SL AMB CIGARETTE SMOKER 05/18/2018 NO     Twin B Nasal Bone 05/18/2018 NOT GIVEN    Orders Only on 04/18/2018   Component Date Value    Down Syndrome Interpreta* 04/18/2018 SEE NOTE     Age Risk Down Syndrome 04/18/2018 1:550     BRIA Down Syndrome Risk 04/18/2018 1:4500     T18 Risk 04/18/2018 <1:5000     Calculated Gestational A* 04/18/2018 12 7     DONTRELL-A Value 04/18/2018 827 3     DONTRELL-A MoM 04/18/2018 1 19     HCG, Quantitative 04/18/2018 85 6     hCG MoM 04/18/2018 1 22     Nuchal Translucency MoM 04/18/2018 1 15     Referring Physician Name 04/18/2018 Tyson Naqvi Referring Physician Phone 04/18/2018 [phone]     Referring Physician NPI 04/18/2018 [id number]     Date of Birth [birth year]     Collected On 04/18/2018 04/18/2018     Maternal Weight 04/18/2018 190     Estimated Delivery Date 04/18/2018 10/27/2018     Gestat  Age Based On 04/18/2018 ULTRASOUND     Race 04/18/2018      Number of Fetuses 04/18/2018 1     Insulin Dep   Diabetic 04/18/2018 NO     Repeat Specimen 04/18/2018 NOT GIVEN     Hx Of Neural Tube Defects 04/18/2018 NO     Prev Pregnancy Down Synd 04/18/2018 NO     Donor Egg 04/18/2018 NO     Donor Age: Egg Retrieval 04/18/2018 NOT GIVEN     SL AMB CIGARETTE SMOKER 04/18/2018 NO     Ultrasound Date 04/18/2018 NOT GIVEN     Ultrasonographer's Name 04/18/2018 NAIDA KAT     Sonographer ID 04/18/2018 [id number]     NTQR Location ID# 04/18/2018 NOT GIVEN     NTQR Reading Phys ID# 04/18/2018 [id number]     Sonographer ID 04/18/2018 NOT GIVEN     [place] Rump Length 04/18/2018 66     Nuchal Translucency (NT) 04/18/2018 1 7     Nasal Bone 04/18/2018 PRESENT     If Twins 04/18/2018 NOT GIVEN     [place] Rump Length Twin B 04/18/2018 NOT GIVEN     NT Twin B 04/18/2018 NOT GIVEN     Twin B Nasal Bone 04/18/2018 NOT GIVEN    Initial Prenatal on 04/05/2018   Component Date Value    N gonorrhoeae, DNA Probe 04/05/2018 N  gonorrhoeae Amplified DNA Negative     Chlamydia, DNA Probe 04/05/2018 C  trachomatis Amplified DNA Negative    Initial Prenatal on 03/28/2018   Component Date Value    HIV-1/HIV-2 Ab 03/28/2018 Non-Reactive     Color, UA 03/28/2018 Yellow     Clarity, UA 03/28/2018 Cloudy     Specific Gravity, UA 03/28/2018 1 019     pH, UA 03/28/2018 7 5     Leukocytes, UA 03/28/2018 Negative     Nitrite, UA 03/28/2018 Negative     Protein, UA 03/28/2018 Negative     Glucose, UA 03/28/2018 Negative     Ketones, UA 03/28/2018 Negative     Urobilinogen, UA 03/28/2018 0 2     Bilirubin, UA 03/28/2018 Negative     Blood, UA 03/28/2018 Negative     Urine Culture 03/28/2018 <10,000 cfu/ml      Hepatitis B Surface Ag 03/28/2018 Non-reactive     WBC 03/28/2018 9 01     RBC 03/28/2018 4 85     Hemoglobin 03/28/2018 14 0     Hematocrit 03/28/2018 41 6     MCV 03/28/2018 86     MCH 03/28/2018 28 9     MCHC 03/28/2018 33 7     RDW 03/28/2018 13 3     MPV 03/28/2018 9 9     Platelets 67/19/4260 305     nRBC 03/28/2018 0     Neutrophils Relative 03/28/2018 59     Lymphocytes Relative 03/28/2018 31     Monocytes Relative 03/28/2018 6     Eosinophils Relative 03/28/2018 4     Basophils Relative 03/28/2018 0     Neutrophils Absolute 03/28/2018 5 32     Lymphocytes Absolute 03/28/2018 2 75     Monocytes Absolute 03/28/2018 0 51     Eosinophils Absolute 03/28/2018 0 39     Basophils Absolute 03/28/2018 0 02     Rubella IgG Quant 03/28/2018 146 0     ABO Grouping 03/28/2018 O     Rh Factor 03/28/2018 Positive     Antibody Screen 03/28/2018 Negative     Specimen Expiration Date 03/28/2018 [id number]     RPR 03/28/2018 Non-Reactive    Clinical Support on 03/22/2018   Component Date Value    Progesterone 03/22/2018 21 40     HCG, Quant 03/22/2018 401525*    ABO Grouping 03/22/2018 O     Rh Factor 03/22/2018 Positive     Antibody Screen 03/22/2018 Negative     Specimen Expiration Date 03/22/2018 [id number]        Imaging: I have personally reviewed pertinent reports  ECHO 8/30/2018:   SUMMARY     LEFT VENTRICLE:  There were no regional wall motion abnormalities  There was no evidence of concentric hypertrophy      MITRAL VALVE:  There was trace regurgitation      AORTIC VALVE:  There was trace regurgitation      TRICUSPID VALVE:  There was trace regurgitation      PULMONIC VALVE:  There was trace regurgitation      HISTORY: PRIOR HISTORY: Palpitations, SVT/ablation, pregnancy (31 weeks gestation at time of study)     PROCEDURE: The procedure was performed in the echo lab  This was a routine study  The transthoracic approach was used  The study included complete 2D imaging, M-mode, complete spectral Doppler, and color Doppler  The heart rate was 75 bpm,  at the start of the study  Image quality was adequate      LEFT VENTRICLE: LV mass 111g, normal for BSA Size was normal  Systolic function was by visual assessment  Ejection fraction was estimated to be 55 %  There were no regional wall motion abnormalities  Wall thickness was normal  There was no  evidence of concentric hypertrophy  DOPPLER: Left ventricular diastolic function parameters were normal      RIGHT VENTRICLE: The size was normal  Systolic function was normal      LEFT ATRIUM: Size was normal      RIGHT ATRIUM: Size was at the upper limits of normal      MITRAL VALVE: Valve structure was normal  There was normal leaflet separation  DOPPLER: The transmitral velocity was within the normal range  There was no evidence for stenosis  There was trace regurgitation      AORTIC VALVE: The valve was trileaflet  DOPPLER: Transaortic velocity was within the normal range  There was trace regurgitation      TRICUSPID VALVE: The valve structure was normal  There was normal leaflet separation  DOPPLER: The transtricuspid velocity was within the normal range  There was no evidence for stenosis  There was trace regurgitation   The tricuspid jet  envelope definition was inadequate for estimation of RV systolic pressure  There are no indirect findings (abnormal RV volume or geometry, altered pulmonary flow velocity profile, or leftward septal displacement) which would suggest  moderate or severe pulmonary hypertension      PULMONIC VALVE: Leaflets exhibited normal thickness, no calcification, and normal cuspal separation  DOPPLER: The transpulmonic velocity was within the normal range  There was trace regurgitation      PERICARDIUM: There was no pericardial effusion      AORTA: The root exhibited normal size      SYSTEMIC VEINS: IVC: Respirophasic changes were normal      MEASUREMENT TABLES     2D MEASUREMENTS  Aorta   (Reference normals)  Root diam   26 mm   (--)     SYSTEM MEASUREMENT TABLES     Apical four chamber  LVEF MOD A4C: 63 %  SI (A4C): 45 ml/m2  SV MOD A4C: 95 cm3     Tissue Doppler Imaging  LV Peak Early Bojorquez Tissue Jeremy; Lateral MA (TDI): 162 mm/s  LV Peak Early Bojorquez Tissue Jeremy; Mean; Lateral MA (TDI): 162 mm/s  LV Peak Early Bojorquez Tissue Jeremy; Medial MA (TDI): 143 mm/s     Unspecified Scan Mode  Vmax; Antegrade Flow: 1410 mm/s  LVOT Vmax: 676 mm/s  MV Peak Jeremy/LV Peak Tissue Jeremy E-Wave; Lateral MA: 5  MV Peak Jeremy/LV Peak Tissue Jeremy E-Wave; Medial MA: 5 6  DT; Antegrade Flow: 275 ms  DT; Mean; Antegrade Flow: 275 ms  MV E/A Ratio: 2 2  MV Peak A Jeremy: 365 mm/s  MV Peak E Jeremy; Mean; Antegrade Flow: 805 mm/s    EXERCISE STRESS ECHO 3/2016:    IMPRESSIONS:  Normal study after maximal exercise without reproduction of symptoms  Left  ventricular systolic function was normal      SUMMARY     STRESS RESULTS:  Duration of exercise was 9 min and 0 sec  Maximal work rate was 10 1 METs  Maximal heart rate during stress was 176 bpm ( 91 % of maximal predicted heart  rate)  Target heart rate was achieved    There was no chest pain during stress      ECG CONCLUSIONS:  The stress ECG was negative for ischemia      BASELINE:  There were no regional wall motion abnormalities  Estimated left ventricular ejection fraction was 60 %       PEAK STRESS:  There were no regional wall motion abnormalities      ECHO CONCLUSIONS:  There was no echocardiographic evidence for stress-induced ischemia  HOLTER MONITOR 9/4/2018:  INDICATIONS:   A Holter monitor - 48 hour  was obtained on 8/29/2018 for the reason of SVT      Total beats recorded-202,644      Minimum heart rate 57 beats per minute at 11:50 a m , average 92 beats per minute, maximum 138 beats per minute at 10:53 a m     This represents sinus tachycardia      No ventricular ectopy identified      Only 5 supraventricular ectopic beats recorded, 1 atrial pair, 1 late beat, 2 premature atrial contractions      A diary was not submitted      Patient appears to have removed the monitor around 2:15 a m   On day 2 and did not replace it resulting in approximately 15 hours of missed recording        IMPRESSION:     Unremarkable Holter monitor      EKG from office visit today:   Sinus tachycardia, heart rate 104 beats per minute, normal P-wave morphology ruling out atrial tachycardia or other SVT, no acute ischemia noted

## 2018-09-19 ENCOUNTER — ROUTINE PRENATAL (OUTPATIENT)
Dept: PERINATAL CARE | Facility: CLINIC | Age: 28
End: 2018-09-19
Payer: COMMERCIAL

## 2018-09-19 ENCOUNTER — OFFICE VISIT (OUTPATIENT)
Dept: CARDIOLOGY CLINIC | Facility: CLINIC | Age: 28
End: 2018-09-19
Payer: COMMERCIAL

## 2018-09-19 VITALS
HEIGHT: 68 IN | DIASTOLIC BLOOD PRESSURE: 72 MMHG | HEART RATE: 104 BPM | SYSTOLIC BLOOD PRESSURE: 110 MMHG | WEIGHT: 219 LBS | BODY MASS INDEX: 33.19 KG/M2

## 2018-09-19 VITALS
SYSTOLIC BLOOD PRESSURE: 108 MMHG | DIASTOLIC BLOOD PRESSURE: 72 MMHG | WEIGHT: 219.2 LBS | BODY MASS INDEX: 33.22 KG/M2 | HEART RATE: 103 BPM | HEIGHT: 68 IN

## 2018-09-19 DIAGNOSIS — Z34.93 THIRD TRIMESTER PREGNANCY: ICD-10-CM

## 2018-09-19 DIAGNOSIS — R00.0 SINUS TACHYCARDIA: ICD-10-CM

## 2018-09-19 DIAGNOSIS — Z3A.34 34 WEEKS GESTATION OF PREGNANCY: ICD-10-CM

## 2018-09-19 DIAGNOSIS — Z34.93 ENCOUNTER FOR PREGNANCY RELATED EXAMINATION IN THIRD TRIMESTER: Primary | ICD-10-CM

## 2018-09-19 DIAGNOSIS — O99.891 MATERNAL CONGENITAL CARDIAC ANOMALY COMPLICATING PREGNANCY IN THIRD TRIMESTER: ICD-10-CM

## 2018-09-19 DIAGNOSIS — R00.0 TACHYCARDIA: Primary | ICD-10-CM

## 2018-09-19 DIAGNOSIS — O40.3XX0 POLYHYDRAMNIOS IN SINGLETON PREGNANCY IN THIRD TRIMESTER: Primary | ICD-10-CM

## 2018-09-19 DIAGNOSIS — Q24.9 MATERNAL CONGENITAL CARDIAC ANOMALY COMPLICATING PREGNANCY IN THIRD TRIMESTER: ICD-10-CM

## 2018-09-19 DIAGNOSIS — I47.1 SVT (SUPRAVENTRICULAR TACHYCARDIA) (HCC): ICD-10-CM

## 2018-09-19 PROCEDURE — 76818 FETAL BIOPHYS PROFILE W/NST: CPT | Performed by: OBSTETRICS & GYNECOLOGY

## 2018-09-19 PROCEDURE — 99214 OFFICE O/P EST MOD 30 MIN: CPT | Performed by: PHYSICIAN ASSISTANT

## 2018-09-19 PROCEDURE — 93000 ELECTROCARDIOGRAM COMPLETE: CPT | Performed by: PHYSICIAN ASSISTANT

## 2018-09-19 PROCEDURE — 99212 OFFICE O/P EST SF 10 MIN: CPT | Performed by: OBSTETRICS & GYNECOLOGY

## 2018-09-19 NOTE — PATIENT INSTRUCTIONS
Thank you for choosing [facility] for your  care today  If you have any questions about your ultrasound or care, please do not hesitate to contact us or your primary obstetrician

## 2018-09-20 ENCOUNTER — TELEPHONE (OUTPATIENT)
Dept: OBGYN CLINIC | Facility: MEDICAL CENTER | Age: 28
End: 2018-09-20

## 2018-09-20 ENCOUNTER — HOSPITAL ENCOUNTER (OUTPATIENT)
Facility: HOSPITAL | Age: 28
Discharge: HOME | End: 2018-09-20
Attending: OBSTETRICS & GYNECOLOGY | Admitting: OBSTETRICS & GYNECOLOGY
Payer: COMMERCIAL

## 2018-09-20 VITALS
WEIGHT: 218 LBS | DIASTOLIC BLOOD PRESSURE: 64 MMHG | TEMPERATURE: 98.4 F | RESPIRATION RATE: 18 BRPM | BODY MASS INDEX: 33.04 KG/M2 | HEIGHT: 68 IN | OXYGEN SATURATION: 97 % | HEART RATE: 100 BPM | SYSTOLIC BLOOD PRESSURE: 107 MMHG

## 2018-09-20 PROCEDURE — 99202 OFFICE O/P NEW SF 15 MIN: CPT

## 2018-09-20 NOTE — PROGRESS NOTES
L&D Triage Note - OB/GYN  Michelle Rivera 25 y o  female MRN: [id number]  Unit/Bed#: L&D 329-02 Encounter: [id number]      Assessment:  25 y o  [id number] at 34w3d not PPROM    Plan:  1  R/o PPROM  - Speculum exam, Dry Mt, Wet Mt, KOH, nitrazine test  - SVE  - FHT monitoring  2  Continue routine prenatal care visit  3  Discharge from [facility] triage with  labor precaution, vaginal bleeding, fluid leakage and lack of fetal movement  Discussed w/pt after exam spotting possibilty    ______________________________________________________________________      Chief Compliant: Fluid leakage yesterday      TIME: 1009  Subjective:  25 y o  [id number] at 34w3d c/o fluid leakage yesterday during lying bed  She mentioned after that she had same leakage today also but amount was lesser  She decline vaginal bleeding and reporting good fetal movement  Decline itching and bad odor  She decline urinary and GI problems  No preeclamptic signs and she is feeling little heavy and she is complaining sinus infection  She has Hx id SVT and she was yesterday at  cardiologist and she is not complaining any tachycardia and high BP now        Objective:  Vitals:    18 1003   BP: 107/64   Pulse: 100   Resp: 18   Temp: 98 4 °F (36 9 °C)   SpO2: 97%       SVE: 0 / 10% / -2  FHT:  125 / Moderate 6 - 25 bpm / 15x15 accels and no decels , reactive  [place]: q no contractions      D/w Dr Shannan Flanagan MD  09:49 AM

## 2018-09-20 NOTE — TELEPHONE ENCOUNTER
Telephone call from patient  States she woke up this morning in a puddle of liquid    Advised to go to L&D to r/o rupture

## 2018-09-21 ENCOUNTER — ROUTINE PRENATAL (OUTPATIENT)
Dept: OBGYN CLINIC | Facility: MEDICAL CENTER | Age: 28
End: 2018-09-21
Payer: COMMERCIAL

## 2018-09-21 ENCOUNTER — APPOINTMENT (OUTPATIENT)
Dept: PHYSICAL THERAPY | Facility: CLINIC | Age: 28
End: 2018-09-21
Payer: COMMERCIAL

## 2018-09-21 VITALS — WEIGHT: 217 LBS | BODY MASS INDEX: 32.99 KG/M2 | DIASTOLIC BLOOD PRESSURE: 60 MMHG | SYSTOLIC BLOOD PRESSURE: 96 MMHG

## 2018-09-21 DIAGNOSIS — Z3A.34 34 WEEKS GESTATION OF PREGNANCY: Primary | ICD-10-CM

## 2018-09-21 DIAGNOSIS — Z23 NEED FOR VACCINATION: ICD-10-CM

## 2018-09-21 PROCEDURE — 90471 IMMUNIZATION ADMIN: CPT

## 2018-09-21 PROCEDURE — 99213 OFFICE O/P EST LOW 20 MIN: CPT | Performed by: NURSE PRACTITIONER

## 2018-09-21 PROCEDURE — 90686 IIV4 VACC NO PRSV 0.5 ML IM: CPT

## 2018-09-21 NOTE — PROGRESS NOTES
Desirae Phillips is a 25y o  year old [id number] at 34w4d for routine prenatal visit    + FM, no vaginal bleeding, contractions, or LOF  Complaints: Yes thinks she has allergies, stuffiness  Reviewed otc meds safe to use now, increase fluids  Most recent ultrasound and labs reviewed  Flu shot given today  Still going to PT with not much relief of sxs  Taking rx for n/v  Fkc, ptl precautions reviewed

## 2018-09-26 ENCOUNTER — ROUTINE PRENATAL (OUTPATIENT)
Dept: OBGYN CLINIC | Facility: MEDICAL CENTER | Age: 28
End: 2018-09-26
Payer: COMMERCIAL

## 2018-09-26 ENCOUNTER — CLINICAL SUPPORT (OUTPATIENT)
Dept: PERINATAL CARE | Facility: CLINIC | Age: 28
End: 2018-09-26
Payer: COMMERCIAL

## 2018-09-26 ENCOUNTER — OFFICE VISIT (OUTPATIENT)
Dept: PHYSICAL THERAPY | Facility: CLINIC | Age: 28
End: 2018-09-26
Payer: COMMERCIAL

## 2018-09-26 ENCOUNTER — ULTRASOUND (OUTPATIENT)
Dept: PERINATAL CARE | Facility: CLINIC | Age: 28
End: 2018-09-26
Payer: COMMERCIAL

## 2018-09-26 VITALS — DIASTOLIC BLOOD PRESSURE: 66 MMHG | SYSTOLIC BLOOD PRESSURE: 118 MMHG

## 2018-09-26 VITALS
DIASTOLIC BLOOD PRESSURE: 68 MMHG | SYSTOLIC BLOOD PRESSURE: 97 MMHG | BODY MASS INDEX: 33.19 KG/M2 | HEART RATE: 90 BPM | WEIGHT: 219 LBS | HEIGHT: 68 IN

## 2018-09-26 VITALS
BODY MASS INDEX: 33.25 KG/M2 | DIASTOLIC BLOOD PRESSURE: 66 MMHG | WEIGHT: 219.4 LBS | HEART RATE: 89 BPM | HEIGHT: 68 IN | SYSTOLIC BLOOD PRESSURE: 95 MMHG

## 2018-09-26 DIAGNOSIS — M89.9 PUBIC BONE PAIN: ICD-10-CM

## 2018-09-26 DIAGNOSIS — M79.604 LOW BACK PAIN RADIATING TO RIGHT LOWER EXTREMITY: Primary | ICD-10-CM

## 2018-09-26 DIAGNOSIS — O40.3XX0 POLYHYDRAMNIOS IN SINGLETON PREGNANCY IN THIRD TRIMESTER: Primary | ICD-10-CM

## 2018-09-26 DIAGNOSIS — Z36.89 ENCOUNTER FOR ULTRASOUND TO CHECK FETAL GROWTH: ICD-10-CM

## 2018-09-26 DIAGNOSIS — M54.50 LOW BACK PAIN RADIATING TO RIGHT LOWER EXTREMITY: Primary | ICD-10-CM

## 2018-09-26 DIAGNOSIS — Z3A.35 35 WEEKS GESTATION OF PREGNANCY: ICD-10-CM

## 2018-09-26 DIAGNOSIS — Z34.93 THIRD TRIMESTER PREGNANCY: Primary | ICD-10-CM

## 2018-09-26 PROCEDURE — 97110 THERAPEUTIC EXERCISES: CPT | Performed by: PHYSICAL THERAPIST

## 2018-09-26 PROCEDURE — 99213 OFFICE O/P EST LOW 20 MIN: CPT | Performed by: OBSTETRICS & GYNECOLOGY

## 2018-09-26 PROCEDURE — 99203 OFFICE O/P NEW LOW 30 MIN: CPT | Performed by: ANESTHESIOLOGY

## 2018-09-26 PROCEDURE — 97140 MANUAL THERAPY 1/> REGIONS: CPT | Performed by: PHYSICAL THERAPIST

## 2018-09-26 PROCEDURE — 76816 OB US FOLLOW-UP PER FETUS: CPT | Performed by: OBSTETRICS & GYNECOLOGY

## 2018-09-26 RX ORDER — DIPHENHYDRAMINE HCL 25 MG
25 CAPSULE ORAL
COMMUNITY
End: 2019-03-20 | Stop reason: ALTCHOICE

## 2018-09-26 RX ORDER — LORATADINE 10 MG/1
10 TABLET ORAL DAILY
COMMUNITY
End: 2020-02-11 | Stop reason: ALTCHOICE

## 2018-09-26 NOTE — PATIENT INSTRUCTIONS
Thank you for choosing [facility] for your  care today  If you have any questions about your ultrasound or care, please do not hesitate to contact us or your primary obstetrician  At this time, no additional ultrasounds are advised through the  center, however, if your doctors would like you to have any additional ultrasounds, they will let us know

## 2018-09-26 NOTE — PROGRESS NOTES
Daily Note     Today's date: 2018  Patient name: Lupe Pollack  : 1990  MRN: [id number]  Referring provider: MARTINEZ Danielle  Dx:   Encounter Diagnosis     ICD-10-CM    1  Low back pain radiating to right lower extremity M54 5    2  Pubic bone pain M89 9                   Subjective: Pt rates pain about 7/10 and states that pain has been bad since last week with a lot of appointments  Pt denies numbness and tingling in legs  Pt reports headaches secondary to allergy congestion  Pt reports over the weekend that she started to get sharp shooting pains into L low back radiating to posterior hip and mid-thigh        Objective: See treatment diary below    Precautions: 3rd trimester pregnancy, headaches    Daily Treatment Diary     Manual  18 912/18 18         BP pre tx 118/54 mmHg 120/62 mmHg 115/60  mmHg 98/65 mmHg         MET abd/ad 5"x3 ea  nv 5"x3 ea, submax         R hip PROM PRN nv  nv np         R piriformis TPR PRN nv  nv ED, and HS         R lumbar PSM STM in L sidelying over pillow   ED np         Pelvic compression w/ rhythmic rotation    supine, ED         sidelying manual sciatic n glide B    ED         sidelying QL stretch w/ med/inf glide on iliac crest    ED         TPR to L OI and piriformis in sidelying    ED             Exercise Diary  18         TA + hip add in reclined 3"x10 3"x15 3"x15 3"x10         Reclined sciatic n glide w towel 3 pumps, 5x ea 3 pumps, 5x ea np np         Side stepping nv 2 laps 3 laps np         TA in reclined nv 3"x10 3"x15 3"x10         TA + hip TB abd nv Org, 3"x15 Grn, 3"x10 Grn, 3"x10         TA + BKFO nv 10x ea 10x ea 10x ea         TA + LTR nv  np 5"x10 ea         Pball APT/PPT nv Blue 10x ea Np, p! np         pball lumbar flexion nv 10"x5 10" x 5 np         Seated sciatic n glide, 3 pumps  10x ea 10x ea np         Ab brace + mini squats   10x np Modalities              none                                             Assessment: Tolerated treatment fairly well  Patient demonstrated fatigue post treatment and would benefit from continued PT Pt states "I can feel every little thing today" during exercises  Pt displays elevated L iliac crest in standing and reports pain over R pubis and L sacrum into buttocks and posterior thigh  Pt has good tolerance to manuals demonstrating more nerve issues on L, soft tissue restrictions on R  Pt has relief of pubic pain with manual compression and rhythmic rocking  Pt demonstrates tightness over R piriformis and hamstrings, L piriformis and OI  Pt demonstrates improved transfer following manuals  Reassessment of landmarks reveals more even iliac crests in standing  Pt reports no nerve pain into L side, 3/10 R side pubic pain at end of session  Advised pt to be careful with car transfers with remaining appointments today  Plan: Continue per plan of care  Progress treatment as tolerated  Change approach to focus on manuals if pt notes additional relief at NV

## 2018-09-26 NOTE — PROGRESS NOTES
OB Anesthesia Consult  Nikkie Iqbal 25 y o  female MRN: [id number]    HPI  25year old  who presents to discuss anesthesia options for her upcoming scheduled, tertiary  delivery on 10/22/18  She had a  with her first pregnancy secondary to bicornate uterus and subsequently had a repeat section for her second delivery  Her medical history is notable for SVT which developed after her last delivery  She had an ablation which had to be aborted because the focus was near the AV node and there was concern for damage to the AV node  She has tried several antiarrhythmics however she has not been able to tolerate side effects, she is not taking any cardiac medications currently  Her symptoms largely resolved sometime ago after a cholecystectomy however during this pregnancy she has noted palpitations  She has seen cardiology and is wearing a loop recorder  Her most recent interrogation did not show any SVT, periods where she was having palpations were assessed and the EKG showed sinus tachycardia  During these periods of palpitations she says she feels lightheaded however she denies syncope, nausea, chest pain, etc  She had a recent echo which was normal and showed normal LV function  Her medical history is also notable for severe nausea and vomiting during her previous  deliveries which occurred immediately after induction of spinal anesthesia, most likely secondary to sympathectomy  She states there has been difficulty gaining IV access and always has IVs placed in her antecubital fossa  Meds/Allergies     Allergies   Allergen Reactions    Pollen Extract Allergic Rhinitis       HR:  [89-90] 89  BP: ()/(66-68) 95/66    Review of Systems   Constitutional: Negative for fever  HEENT: Negative for sore throat  Negative for visual disturbance  Respiratory: Negative for shortness of breath  Cardiovascular: Negative for chest pain  Gastrointestinal: Negative for abdominal pain  Negative for nausea/vomiting  Endocrine: Negative for polyuria  Genitourinary: Negative for dysuria  Musculoskeletal: Negative for arthralgias  Skin: Negative for rash  Allergic/Immunologic: Negative for environmental allergies  Neurological: Negative for dizziness  Hematological: Negative for adenopathy  Psychiatric/Behavioral: Negative for agitation  Physical Exam: BP 95/66 (BP Location: Right arm, Patient Position: Sitting, Cuff Size: Large)   Pulse 89   Ht 5' 8" (1 727 m)   Wt 99 5 kg (219 lb 6 4 oz)   LMP 2018 (Approximate)   BMI 33 36 kg/m²   Gen: Well appearing and well nourished, NAD  Skin: Warm, Dry   HEENT:  PERRLA, EOMI  CV: RRR, +s1/s2, no M/R/G  Pul: Lungs CTAB  Abd: Soft, non-tender, gravid abdomen  Ext:  No cyanosis or edema  Neuro: AAOx3; CN II-XII grossly intact; 5/5 BLUE, 5/5 BLLE; Sensation intact grossly     Lab Results:  Lab Results   Component Value Date    WBC 10 37 (H) 2018    HGB 11 7 2018    HCT 38 3 2018    MCV 94 2018     2018     No results found for: GLUCOSE, CALCIUM, NA, K, CO2, CL, BUN, CREATININE    Plan:   Jorge Alberto Chávez is a good candidate for neuraxial anesthesia for her tertiary  delivery  I explained that the sympathectomy from spinal/epidural anesthesia is protective against tachyarrhythmias and is the preferred mode of anesthesia for  delivery  I reassured her that the anesthesiologist is trained to treat arrhythmias if she should develop one during delivery  I recommend that she be given a scopolamine patch prior to the procedure and maintain use for three days to help with nausea/vomiting prophylaxis  Additionally, since she is prone to nausea secondary to the hypotension associated with spinal anesthesia I recommend that she have a phenylephrine infusion started before or shortly after induction of neuraxial anesthesia   She should receive at least two types of antiemetics during her procedure and I explained that she should have additional antiemetics written for her in the postop period  Lastly, since she has had bad experiences with IVs placed in her hands which cause her significant anxiety it would be best to have her IV placed higher up in her arm         SIGNATURE: Karen Fournier MD  DATE: September 26, 2018  TIME: 12:59 PM

## 2018-09-26 NOTE — PROGRESS NOTES
Shelby Fotser is a 25y o  year old  at 35w2d for routine prenatal visit    + FM, no vaginal bleeding, contractions, or LOF  Complaints: Yes - pelvic pressure since Thursday / no contractions   Most recent ultrasound and labs reviewed    All questions answered

## 2018-09-26 NOTE — PROGRESS NOTES
[id number] [address]: Ms Elissa Garcia was seen today at 35w2d for fetal growth assessment ultrasound  See ultrasound report under "OB Procedures" tab  Please don't hesitate to contact our office with any concerns or questions    Juan Raines MD

## 2018-09-28 ENCOUNTER — OFFICE VISIT (OUTPATIENT)
Dept: PHYSICAL THERAPY | Facility: CLINIC | Age: 28
End: 2018-09-28
Payer: COMMERCIAL

## 2018-09-28 DIAGNOSIS — M89.9 PUBIC BONE PAIN: ICD-10-CM

## 2018-09-28 DIAGNOSIS — M79.604 LOW BACK PAIN RADIATING TO RIGHT LOWER EXTREMITY: Primary | ICD-10-CM

## 2018-09-28 DIAGNOSIS — M54.50 LOW BACK PAIN RADIATING TO RIGHT LOWER EXTREMITY: Primary | ICD-10-CM

## 2018-09-28 PROCEDURE — 97110 THERAPEUTIC EXERCISES: CPT | Performed by: PHYSICAL THERAPIST

## 2018-09-28 PROCEDURE — 97140 MANUAL THERAPY 1/> REGIONS: CPT | Performed by: PHYSICAL THERAPIST

## 2018-09-28 NOTE — PROGRESS NOTES
Daily Note     Today's date: 2018  Patient name: Jesica Gutierrez  : 1990  MRN: [id number]  Referring provider: MARTINEZ Greer  Dx:   Encounter Diagnosis     ICD-10-CM    1  Low back pain radiating to right lower extremity M54 5    2  Pubic bone pain M89 9                   Subjective: Pt states that L sided sciatic pain has resolved since last visit and she is feeling better in that respect  Pt continues to have R sided pain, worse over pubic bone and adductors, rated 6-7/10        Objective: See treatment diary below    Precautions: 3rd trimester pregnancy, headaches    Daily Treatment Diary     Manual  18 912/18 18        BP pre tx 118/54 mmHg 120/62 mmHg 115/60  mmHg 98/65 mmHg 104/66 mmHg        MET abd/ad 5"x3 ea  nv 5"x3 ea, submax np        R hip PROM PRN nv  nv np np        R piriformis TPR PRN nv  nv ED, and HS np        R lumbar PSM STM in L sidelying over pillow   ED np np        Pelvic compression w/ rhythmic rotation    supine, ED ED        sidelying manual sciatic n glide B    ED np        sidelying QL stretch w/ med/inf glide on iliac crest    ED ED        TPR to L OI and piriformis in sidelying    ED np        TPR to R hip flexor and adductors     ED        TPR and ROM to R scapula and rhomboids     ED            Exercise Diary  18        TA + hip add in reclined 3"x10 3"x15 3"x15 3"x10 np        Reclined sciatic n glide w towel 3 pumps, 5x ea 3 pumps, 5x ea np np np        Side stepping nv 2 laps 3 laps np np        TA in reclined nv 3"x10 3"x15 3"x10 np        TA + hip TB abd nv Org, 3"x15 Grn, 3"x10 Grn, 3"x10 np        TA + BKFO nv 10x ea 10x ea 10x ea np        TA + LTR nv  np 5"x10 ea np        Pball APT/PPT nv Blue 10x ea Np, p! np np        pball lumbar flexion nv 10"x5 10" x 5 np np        Seated sciatic n glide, 3 pumps  10x ea 10x ea np np        Ab brace + mini squats   10x np np        TA + TB shoulder ext 3"x10, org        TA+ TB low rows     3"x10, org                                                                                                       Modalities              none                                             Assessment: Tolerated treatment well  Patient would benefit from continued PT Pt had good tolerance to manuals, noting upper back felt looser with less pain  Pt reports decreased pain over R pubis, rated 4-5/10  Pt denies pain or tingling into lateral R thigh  Pt had good tolerance to thoracic postural exercises  Plan: Continue per plan of care  Progress treatment as tolerated

## 2018-10-03 ENCOUNTER — OFFICE VISIT (OUTPATIENT)
Dept: PHYSICAL THERAPY | Facility: CLINIC | Age: 28
End: 2018-10-03
Payer: COMMERCIAL

## 2018-10-03 ENCOUNTER — ROUTINE PRENATAL (OUTPATIENT)
Dept: OBGYN CLINIC | Facility: MEDICAL CENTER | Age: 28
End: 2018-10-03
Payer: COMMERCIAL

## 2018-10-03 VITALS — DIASTOLIC BLOOD PRESSURE: 60 MMHG | WEIGHT: 218 LBS | BODY MASS INDEX: 33.15 KG/M2 | SYSTOLIC BLOOD PRESSURE: 122 MMHG

## 2018-10-03 DIAGNOSIS — M79.604 LOW BACK PAIN RADIATING TO RIGHT LOWER EXTREMITY: Primary | ICD-10-CM

## 2018-10-03 DIAGNOSIS — Z98.890 PONV (POSTOPERATIVE NAUSEA AND VOMITING): ICD-10-CM

## 2018-10-03 DIAGNOSIS — M89.9 PUBIC BONE PAIN: ICD-10-CM

## 2018-10-03 DIAGNOSIS — R11.2 PONV (POSTOPERATIVE NAUSEA AND VOMITING): ICD-10-CM

## 2018-10-03 DIAGNOSIS — M54.50 LOW BACK PAIN RADIATING TO RIGHT LOWER EXTREMITY: Primary | ICD-10-CM

## 2018-10-03 DIAGNOSIS — Z3A.36 36 WEEKS GESTATION OF PREGNANCY: ICD-10-CM

## 2018-10-03 DIAGNOSIS — Z34.93 ENCOUNTER FOR PREGNANCY RELATED EXAMINATION IN THIRD TRIMESTER: Primary | ICD-10-CM

## 2018-10-03 PROCEDURE — 99213 OFFICE O/P EST LOW 20 MIN: CPT | Performed by: OBSTETRICS & GYNECOLOGY

## 2018-10-03 PROCEDURE — 87653 STREP B DNA AMP PROBE: CPT | Performed by: OBSTETRICS & GYNECOLOGY

## 2018-10-03 PROCEDURE — 97140 MANUAL THERAPY 1/> REGIONS: CPT | Performed by: PHYSICAL THERAPIST

## 2018-10-03 RX ORDER — SCOLOPAMINE TRANSDERMAL SYSTEM 1 MG/1
1 PATCH, EXTENDED RELEASE TRANSDERMAL
Qty: 1 PATCH | Refills: 0 | Status: SHIPPED | OUTPATIENT
Start: 2018-10-03 | End: 2018-10-25 | Stop reason: HOSPADM

## 2018-10-03 NOTE — PROGRESS NOTES
Daily Note     Today's date: 10/3/2018  Patient name: Victor Manuel Rosales  : 1990  MRN: [id number]  Referring provider: MARTINEZ Nava  Dx:   Encounter Diagnosis     ICD-10-CM    1  Low back pain radiating to right lower extremity M54 5    2  Pubic bone pain M89 9                   Subjective: Pt reports significant relief after last session  Pt reported pubic soreness x1 day then relief  Pt states that she had OB appointment earlier this morning and everything is on track  Pt reports some low back pain worse at night, relieved in the morning; OB is aware        Objective: See treatment diary below      Precautions: 3rd trimester pregnancy, headaches    Daily Treatment Diary     Manual  18/18 9/14/18 9/26/18 9/28/18 10/3/18       BP pre tx 118/54 mmHg 120/62 mmHg 115/60  mmHg 98/65 mmHg 104/66 mmHg 100/78 mmHg       MET abd/ad 5"x3 ea  nv 5"x3 ea, submax np np       R hip PROM PRN nv  nv np np np       R piriformis TPR PRN nv  nv ED, and HS np np       R lumbar PSM STM in L sidelying over pillow   ED np np ED       Pelvic compression w/ rhythmic rotation    supine, ED ED ED       sidelying manual sciatic n glide B    ED np np       sidelying QL stretch w/ med/inf glide on iliac crest    ED ED ED       TPR to L OI and piriformis in sidelying    ED np np       TPR to R hip flexor and adductors     ED np       TPR and ROM to R scapula and rhomboids     ED ED           Exercise Diary  9/6/18 9/12/18 9/14/18 9/26/18 9/28/18 10/3/18       TA + hip add in reclined 3"x10 3"x15 3"x15 3"x10 np np       Reclined sciatic n glide w towel 3 pumps, 5x ea 3 pumps, 5x ea np np np np       Side stepping nv 2 laps 3 laps np np np       TA in reclined nv 3"x10 3"x15 3"x10 np np       TA + hip TB abd nv Org, 3"x15 Grn, 3"x10 Grn, 3"x10 np np       TA + BKFO nv 10x ea 10x ea 10x ea np np       TA + LTR nv  np 5"x10 ea np np       Pball APT/PPT nv Blue 10x ea Np, p! np np np       pball lumbar flexion nv 10"x5 10" x 5 np np np       Seated sciatic n glide, 3 pumps  10x ea 10x ea np np np       Ab brace + mini squats   10x np np np       TA + TB shoulder ext     3"x10, org np       TA+ TB low rows     3"x10, org np                                                                                                      Modalities              none                                             Assessment: Tolerated treatment well  Patient would benefit from continued PT Attempted pelvic rhythmic rocking in hooklying on wedge, however pt became hot and lightheaded  Position was immediately stopped  Pt sat and drank some water and was given CP for her neck  Pt reports feeling better and able to continue treatment in sidelying position  Pt reports relief with manuals, denying significant pain at end of session  /76 mmHg at end of session, no S/S of cardiac issues  Plan: Continue per plan of care  Progress treatment as tolerated

## 2018-10-03 NOTE — PROGRESS NOTES
Eder Cap is a 25y o  year old  at 36w2d for routine prenatal visit  GBS done Yes  PCN allergy No  Labor precautions given  [address] reviewed     See anesthesia consult from

## 2018-10-05 ENCOUNTER — OFFICE VISIT (OUTPATIENT)
Dept: PHYSICAL THERAPY | Facility: CLINIC | Age: 28
End: 2018-10-05
Payer: COMMERCIAL

## 2018-10-05 DIAGNOSIS — M54.50 LOW BACK PAIN RADIATING TO RIGHT LOWER EXTREMITY: ICD-10-CM

## 2018-10-05 DIAGNOSIS — M89.9 PUBIC BONE PAIN: Primary | ICD-10-CM

## 2018-10-05 DIAGNOSIS — M79.604 LOW BACK PAIN RADIATING TO RIGHT LOWER EXTREMITY: ICD-10-CM

## 2018-10-05 LAB — GP B STREP DNA SPEC QL NAA+PROBE: NORMAL

## 2018-10-05 PROCEDURE — G8991 OTHER PT/OT GOAL STATUS: HCPCS | Performed by: PHYSICAL THERAPIST

## 2018-10-05 PROCEDURE — 97140 MANUAL THERAPY 1/> REGIONS: CPT | Performed by: PHYSICAL THERAPIST

## 2018-10-05 PROCEDURE — G8990 OTHER PT/OT CURRENT STATUS: HCPCS | Performed by: PHYSICAL THERAPIST

## 2018-10-05 NOTE — PROGRESS NOTES
PT Re-Evaluation     Today's date: 10/5/2018  Patient name: Princess Hawkins  : 1990  MRN: [id number]  Referring provider: MARTINEZ Coffey  Dx:   Encounter Diagnosis     ICD-10-CM    1  Pubic bone pain M89 9    2  Low back pain radiating to right lower extremity M54 5                   Assessment  Impairments: abnormal gait, activity intolerance, impaired physical strength, pain with function, poor posture  and poor body mechanics    Assessment details: Princess Hawkins is a 25 y o  female who presents to physical therapy with Low back pain radiating to right lower extremity and pubic bone pain  Pt has greater ease with rolling in bed but continues to use UE support for supine to sit transfers  Pt demonstrates improved mobility and transfers  Pt continues to have difficulty with SI stability, R > L  Pt has soft tissue tension throughout R QL, adductors, and hip flexors  Pt reports relief with manual stretching and positioning and states that she can walk easier compared to starting PT  Princess Hawkins would benefit from continued formal physical therapy to address impairments as detailed, decrease pain, and restore maximal level of function for all home, work, and mobility tasks  Thank you for this referral     Understanding of Dx/Px/POC: good   Prognosis: good    Goals  Short-term goals:  1  Pt will decrease pain by 1-2 points within 4 weeks  - met  2  Pt will improve ROM by 5-10 degrees within 4 weeks  - met  3  Pt will improve strength by 1/2 grade within 4 weeks  - met  4  Pt will improve physical FS score by 10 points by discharge  - met  Long-term goals:  1  Pt will demonstrate independence with HEP by discharge  - met  2  Pt will return to all home tasks with good body mechanics and pain <3/10 by discharge  - partially met  3  Pt will perform all transfers with improved stability and without assistance by discharge  - partially met  4  Pt will roll in bed with pain <5/10 by discharge  - partially met    Plan  Patient would benefit from: skilled PT  Planned modality interventions: cryotherapy and thermotherapy: hydrocollator packs  Planned therapy interventions: joint mobilization, manual therapy, neuromuscular re-education, patient education, strengthening, stretching, therapeutic exercise, flexibility, functional ROM exercises, home exercise program, abdominal trunk stabilization, activity modification, body mechanics training, postural training and therapeutic activities  Frequency: 2x week  Duration in weeks: 4  Treatment plan discussed with: patient  Plan details: Plan to continue treatment until time of delivery        Subjective Evaluation    History of Present Illness  Mechanism of injury: Pt reports that she is feeling better and moving easier since starting therapy  Pt feels most pain in R groin and through low back  Pt denies numbness and tingling into lower extremities  Pt denies changes in bowel/bladder function, denies saddle anesthesia  Pt has been compliant with therapy attendance and performing home exercise program  Pt reports greater ease with sitting and does not have as much pain  Pt has less pain with car transfers  Pt avoids heavy work at home currently  Pt states that stairs are going ok    Pain  At best pain ratin  At worst pain ratin  Location: low back and pubic pain  Quality: throbbing ("like a pole is jamming into me"; less "stabbing" at RE)  Progression: improved    Social Support  Stairs in house: yes   Lives with: spouse and young children    Employment status: working    Diagnostic Tests  No diagnostic tests performed  Patient Goals  Patient goals for therapy: decreased pain  Patient goal: alleviate pain to walk without discomfort- partially met        Objective     Special Questions  Negative for bladder dysfunction, bowel dysfunction and saddle (S4) numbness    Static Posture     Comments  R innominate posteriorly rotated, tenderness over R QL and lumbar PSM; mostly resolved at RE    Neurological Testing     Sensation     Lumbar   Left   Intact: light touch    Right   Intact: light touch    Reflexes   Left   Patellar (L4): trace (1+)  Achilles (S1): normal (2+)  Clonus sign: negative    Right   Patellar (L4): trace (1+)  Achilles (S1): normal (2+)  Clonus sign: negative    Active Range of Motion     Lumbar   Flexion: 90 degrees   Extension: 40 (pain in R groin and pubis; at RE pull only) degrees   Left lateral flexion: 30 degrees   Right lateral flexion: 27 (stabbing pain; resolved at RE) degrees   Left rotation: Active left lumbar rotation: 100%   Right rotation: Active right lumbar rotation: 100%     Strength/Myotome Testing     Left Hip   Planes of Motion   Flexion: 4 (compensates with R trunk lean, pain in R side)  External rotation: 5  Internal rotation: 5    Right Hip   Planes of Motion   Flexion: 5 (increased pain, trunk compensation; no pain at RE)  External rotation: 4+ (pain)  Internal rotation: 5    Left Knee   Flexion: 5  Extension: 5    Right Knee   Flexion: 5 (pain; minimal at RE)  Extension: 5    Left Ankle/Foot   Dorsiflexion: 5  Plantar flexion: 5  Inversion: 5  Eversion: 5    Right Ankle/Foot   Dorsiflexion: 5  Plantar flexion: 5  Inversion: 5  Eversion: 5    Tests       Thoracic   Negative slump  Lumbar   Negative slump  Left   Negative crossed SLR and passive SLR  Right   Negative crossed SLR and passive SLR  Additional Tests Details  + RUFUS R, pain with flexion of R hip to 90 deg; resolved at RE  ASLR better with SI gapping; at RE good control without manual assistance  Tender over R proximal TFL and greater trochanter, pain over R hip flexor, pain over R pubis, more painful with spring; at RE some tenderness over R hip flexor and pubis  At RE improved SI stability, continued pain on R    Ambulation     Observational Gait   Decreased walking speed and stride length     Base of support: increased    Additional Observational Gait Details  Pt shifts off of R side; at RE pt demonstrates gait WNL for 3rd trimester pregnancy    Functional Assessment   Squat No pain, not sitting toward left side and no right valgus       Precautions: 3rd trimester pregnancy, headaches    Daily Treatment Diary     Manual  9/6/18 912/18 9/14/18 9/26/18 9/28/18 10/3/18 10/5/18      BP pre tx 118/54 mmHg 120/62 mmHg 115/60  mmHg 98/65 mmHg 104/66 mmHg 100/78 mmHg 104/70 mmHg      MET abd/ad 5"x3 ea  nv 5"x3 ea, submax np np np      R hip PROM PRN nv  nv np np np np      R piriformis TPR PRN nv  nv ED, and HS np np np      R lumbar PSM STM in L sidelying over pillow   ED np np ED ED      Pelvic compression w/ rhythmic rotation    supine, ED ED ED ED      sidelying manual sciatic n glide B    ED np np np      sidelying QL stretch w/ med/inf glide on iliac crest    ED ED ED ED      TPR to L OI and piriformis in sidelying    ED np np np      TPR to R hip flexor and adductors     ED np ED      TPR and ROM to R scapula and rhomboids     ED ED ED      measurements       ED          Exercise Diary  9/6/18 9/12/18 9/14/18 9/26/18 9/28/18 10/3/18 10/5/18      TA + hip add in reclined 3"x10 3"x15 3"x15 3"x10 np np np      Reclined sciatic n glide w towel 3 pumps, 5x ea 3 pumps, 5x ea np np np np np      Side stepping nv 2 laps 3 laps np np np np      TA in reclined nv 3"x10 3"x15 3"x10 np np np      TA + hip TB abd nv Org, 3"x15 Grn, 3"x10 Grn, 3"x10 np np np      TA + BKFO nv 10x ea 10x ea 10x ea np np np      TA + LTR nv  np 5"x10 ea np np np      Pball APT/PPT nv Blue 10x ea Np, p! np np np np      pball lumbar flexion nv 10"x5 10" x 5 np np np np      Seated sciatic n glide, 3 pumps  10x ea 10x ea np np np np      Ab brace + mini squats   10x np np np np      TA + TB shoulder ext     3"x10, org np nv      TA+ TB low rows     3"x10, org np nv                                                                                                     Modalities              none

## 2018-10-09 ENCOUNTER — ROUTINE PRENATAL (OUTPATIENT)
Dept: OBGYN CLINIC | Facility: MEDICAL CENTER | Age: 28
End: 2018-10-09
Payer: COMMERCIAL

## 2018-10-09 VITALS — BODY MASS INDEX: 33.45 KG/M2 | SYSTOLIC BLOOD PRESSURE: 118 MMHG | WEIGHT: 220 LBS | DIASTOLIC BLOOD PRESSURE: 60 MMHG

## 2018-10-09 DIAGNOSIS — Z34.93 THIRD TRIMESTER PREGNANCY: Primary | ICD-10-CM

## 2018-10-09 DIAGNOSIS — Z3A.37 37 WEEKS GESTATION OF PREGNANCY: ICD-10-CM

## 2018-10-09 PROCEDURE — 99213 OFFICE O/P EST LOW 20 MIN: CPT | Performed by: OBSTETRICS & GYNECOLOGY

## 2018-10-09 NOTE — PROGRESS NOTES
Jolie Sutton is a 25y o  year old  at 42w4d for routine prenatal visit    + FM, no vaginal bleeding, contractions, or LOF  Complaints: No   Most recent ultrasound and labs reviewed    Still with back pain - in PT which helps

## 2018-10-10 ENCOUNTER — OFFICE VISIT (OUTPATIENT)
Dept: PHYSICAL THERAPY | Facility: CLINIC | Age: 28
End: 2018-10-10
Payer: COMMERCIAL

## 2018-10-10 DIAGNOSIS — M89.9 PUBIC BONE PAIN: Primary | ICD-10-CM

## 2018-10-10 DIAGNOSIS — M79.604 LOW BACK PAIN RADIATING TO RIGHT LOWER EXTREMITY: ICD-10-CM

## 2018-10-10 DIAGNOSIS — M54.50 LOW BACK PAIN RADIATING TO RIGHT LOWER EXTREMITY: ICD-10-CM

## 2018-10-10 PROCEDURE — 97140 MANUAL THERAPY 1/> REGIONS: CPT | Performed by: PHYSICAL THERAPIST

## 2018-10-10 NOTE — PROGRESS NOTES
Daily Note     Today's date: 10/10/2018  Patient name: Claude Cee  : 1990  MRN: [id number]  Referring provider: MARTINEZ Rucker  Dx:   Encounter Diagnosis     ICD-10-CM    1  Pubic bone pain M89 9    2  Low back pain radiating to right lower extremity M54 5                   Subjective: Pt reports relief x2 days after last session  Pt rates pain 8/10 at start of session today, worst on the R side  Pt locates most pain over groin, R posterior hamstring, and low back        Objective: See treatment diary below    Precautions: 3rd trimester pregnancy, headaches    Daily Treatment Diary     Manual  18 912/18 9/14/18 9/26/18 9/28/18 10/3/18 10/5/18 10/10/18     BP pre tx 118/54 mmHg 120/62 mmHg 115/60  mmHg 98/65 mmHg 104/66 mmHg 100/78 mmHg 104/70 mmHg 108/74 mmHg     MET abd/ad 5"x3 ea  nv 5"x3 ea, submax np np np np     R hip PROM PRN nv  nv np np np np np     R piriformis TPR PRN nv  nv ED, and HS np np np ED     R lumbar PSM STM in L sidelying over pillow   ED np np ED ED ED     Pelvic compression w/ rhythmic rotation    supine, ED ED ED ED ED     sidelying manual sciatic n glide B    ED np np np ED     sidelying QL stretch w/ med/inf glide on iliac crest    ED ED ED ED ED     TPR to L OI and piriformis in sidelying    ED np np np np     TPR to R hip flexor and adductors     ED np ED ED     TPR and ROM to R scapula and rhomboids     ED ED ED ED     measurements       ED      ktape to B SI joints and over lumbar PSM        ED         Exercise Diary  9/6/18 9/12/18 9/14/18 9/26/18 9/28/18 10/3/18 10/5/18 10/10/18     TA + hip add in reclined 3"x10 3"x15 3"x15 3"x10 np np np np     Reclined sciatic n glide w towel 3 pumps, 5x ea 3 pumps, 5x ea np np np np np np     Side stepping nv 2 laps 3 laps np np np np np     TA in reclined nv 3"x10 3"x15 3"x10 np np np np     TA + hip TB abd nv Org, 3"x15 Grn, 3"x10 Grn, 3"x10 np np np np     TA + BKFO nv 10x ea 10x ea 10x ea np np np np     TA + LTR nv np 5"x10 ea np np np np     Pball APT/PPT nv Blue 10x ea Np, p! np np np np np     pball lumbar flexion nv 10"x5 10" x 5 np np np np np     Seated sciatic n glide, 3 pumps  10x ea 10x ea np np np np np     Ab brace + mini squats   10x np np np np np     TA + TB shoulder ext     3"x10, org np nv np     TA+ TB low rows     3"x10, org np nv np                                                                                                    Modalities              none                                           Assessment: Tolerated treatment well  Patient would benefit from continued PT Pt had good tolerance to manuals performed during session  Pt has significant soft tissue tightness over R piriformis, hamstrings, and erector spinae  Pt demonstrates improved movement and posture following session, reporting pain decreased to 5/10  Trialled ktape to assist with ligamentous stability over SI joints  Pt was advised of precautions and appropriate removal and expressed verbal understanding  Plan: Continue per plan of care  Plan to see pt for shortened session at [facility] to discuss tape and perform manuals

## 2018-10-11 ENCOUNTER — TELEPHONE (OUTPATIENT)
Dept: OBGYN CLINIC | Facility: MEDICAL CENTER | Age: 28
End: 2018-10-11

## 2018-10-11 NOTE — TELEPHONE ENCOUNTER
----- Message from Mesfin Crocker sent at 10/11/2018  9:03 AM EDT -----  Regarding: RE: Prior auth  PA faxed  ----- Message -----  From: [place]  Sent: 10/10/2018   6:36 PM  To: Mesfin Crocker  Subject: Prior auth                                       Pt called stating rx for scoplamine will need prior auth  Pt needs this for   Please f/u with pt at [phone]  Thanks!

## 2018-10-12 ENCOUNTER — OFFICE VISIT (OUTPATIENT)
Dept: PHYSICAL THERAPY | Facility: CLINIC | Age: 28
End: 2018-10-12
Payer: COMMERCIAL

## 2018-10-12 DIAGNOSIS — M89.9 PUBIC BONE PAIN: Primary | ICD-10-CM

## 2018-10-12 DIAGNOSIS — M79.604 LOW BACK PAIN RADIATING TO RIGHT LOWER EXTREMITY: ICD-10-CM

## 2018-10-12 DIAGNOSIS — M54.50 LOW BACK PAIN RADIATING TO RIGHT LOWER EXTREMITY: ICD-10-CM

## 2018-10-12 PROCEDURE — 97140 MANUAL THERAPY 1/> REGIONS: CPT | Performed by: PHYSICAL THERAPIST

## 2018-10-12 NOTE — PROGRESS NOTES
Daily Note     Today's date: 10/12/2018  Patient name: Ashlyn Leon  : 1990  MRN: [id number]  Referring provider: MARTINEZ Marroquin  Dx:   Encounter Diagnosis     ICD-10-CM    1  Pubic bone pain M89 9    2  Low back pain radiating to right lower extremity M54 5                   Subjective: Pt states that she had a few hours of relief after last visit but has been in significant pain over the past 2 days  Pt did not have any relief with ktape, therefore did not reapply today  Pt reports 8-9/10 pain at start of session        Objective: See treatment diary below    Precautions: 3rd trimester pregnancy, headaches    Daily Treatment Diary     Manual  18/18 9/14/18 9/26/18 9/28/18 10/3/18 10/5/18 10/10/18 10/12/18    BP pre tx 118/54 mmHg 120/62 mmHg 115/60  mmHg 98/65 mmHg 104/66 mmHg 100/78 mmHg 104/70 mmHg 108/74 mmHg 106/65 mmHg    MET abd/ad 5"x3 ea  nv 5"x3 ea, submax np np np np np    R hip PROM PRN nv  nv np np np np np np    R piriformis TPR PRN nv  nv ED, and HS np np np ED ED    R lumbar PSM STM in L sidelying over pillow   ED np np ED ED ED ED    Pelvic compression w/ rhythmic rotation    supine, ED ED ED ED ED np    sidelying manual sciatic n glide B    ED np np np ED np    sidelying QL stretch w/ med/inf glide on iliac crest    ED ED ED ED ED ED    TPR to L OI and piriformis in sidelying    ED np np np np np    TPR to R hip flexor and adductors     ED np ED ED np    TPR and ROM to R scapula and rhomboids     ED ED ED ED ED    measurements       ED      ktape to B SI joints and over lumbar PSM        ED         Exercise Diary  9/6/18 9/12/18 9/14/18 9/26/18 9/28/18 10/3/18 10/5/18 10/10/18 10/12/18    TA + hip add in reclined 3"x10 3"x15 3"x15 3"x10 np np np np np    Reclined sciatic n glide w towel 3 pumps, 5x ea 3 pumps, 5x ea np np np np np np np    Side stepping nv 2 laps 3 laps np np np np np np    TA in reclined nv 3"x10 3"x15 3"x10 np np np np np    TA + hip TB abd nv Org, 3"x15 Grn, 3"x10 Grn, 3"x10 np np np np np    TA + BKFO nv 10x ea 10x ea 10x ea np np np np np    TA + LTR nv  np 5"x10 ea np np np np np    Pball APT/PPT nv Blue 10x ea Np, p! np np np np np np    pball lumbar flexion nv 10"x5 10" x 5 np np np np np np    Seated sciatic n glide, 3 pumps  10x ea 10x ea np np np np np np    Ab brace + mini squats   10x np np np np np np    TA + TB shoulder ext     3"x10, org np nv np np    TA+ TB low rows     3"x10, org np nv np np                                                                                                   Modalities              none                                               Assessment: Tolerated treatment well  Patient would benefit from continued PT Performed manuals only in shortened session secondary to pt had other children with her  Pt has significant soft tissue tension over R rhomboids, reduced following manuals  Pt continues to have difficulty with supine to sit and sit to stand transfers  Pt rates pain as slightly reduced to 6/10 at end of session  Plan: Continue per plan of care  Progress treatment as tolerated

## 2018-10-15 ENCOUNTER — OFFICE VISIT (OUTPATIENT)
Dept: PHYSICAL THERAPY | Facility: CLINIC | Age: 28
End: 2018-10-15
Payer: COMMERCIAL

## 2018-10-15 DIAGNOSIS — M54.50 LOW BACK PAIN RADIATING TO RIGHT LOWER EXTREMITY: ICD-10-CM

## 2018-10-15 DIAGNOSIS — M79.604 LOW BACK PAIN RADIATING TO RIGHT LOWER EXTREMITY: ICD-10-CM

## 2018-10-15 DIAGNOSIS — M89.9 PUBIC BONE PAIN: Primary | ICD-10-CM

## 2018-10-15 PROCEDURE — 97140 MANUAL THERAPY 1/> REGIONS: CPT | Performed by: PHYSICAL THERAPIST

## 2018-10-15 NOTE — PROGRESS NOTES
Daily Note     Today's date: 10/15/2018  Patient name: Augustina Messina  : 1990  MRN: [id number]  Referring provider: MARTINEZ Wilson  Dx:   Encounter Diagnosis     ICD-10-CM    1  Pubic bone pain M89 9    2  Low back pain radiating to right lower extremity M54 5                   Subjective: Pt arrives limping and had procedure performed 1 day ago on R hallux  Pt states that this was painful and has changed her gait  Pt reports relief for a few hours after LV and pain worse in thoracic spine and scapular stabilizers        Objective: See treatment diary below      Precautions: 3rd trimester pregnancy, headaches    Daily Treatment Diary     Manual  18/18 9/14/18 9/26/18 9/28/18 10/3/18 10/5/18 10/10/18 10/12/18 10/15/18   BP pre tx 118/54 mmHg 120/62 mmHg 115/60  mmHg 98/65 mmHg 104/66 mmHg 100/78 mmHg 104/70 mmHg 108/74 mmHg 106/65 mmHg 112/72 mmHg   MET abd/ad 5"x3 ea  nv 5"x3 ea, submax np np np np np np   R hip PROM PRN nv  nv np np np np np np np   R piriformis TPR PRN nv  nv ED, and HS np np np ED ED ED   R lumbar PSM STM in L sidelying over pillow   ED np np ED ED ED ED ED   Pelvic compression w/ rhythmic rotation    supine, ED ED ED ED ED np ED   sidelying manual sciatic n glide B    ED np np np ED np ED, R only   sidelying QL stretch w/ med/inf glide on iliac crest    ED ED ED ED ED ED ED   TPR to L OI and piriformis in sidelying    ED np np np np np np   TPR to R hip flexor and adductors     ED np ED ED np ED   TPR and ROM to R scapula and rhomboids     ED ED ED ED ED ED, w/ IASTM   measurements       ED      ktape to B SI joints and over lumbar PSM        ED         Exercise Diary  9/6/18 9/12/18 9/14/18 9/26/18 9/28/18 10/3/18 10/5/18 10/10/18 10/12/18 10/15/18   TA + hip add in reclined 3"x10 3"x15 3"x15 3"x10 np np np np np np   Reclined sciatic n glide w towel 3 pumps, 5x ea 3 pumps, 5x ea np np np np np np np np   Side stepping nv 2 laps 3 laps np np np np np np np   TA in reclined nv 3"x10 3"x15 3"x10 np np np np np np   TA + hip TB abd nv Org, 3"x15 Grn, 3"x10 Grn, 3"x10 np np np np np np   TA + BKFO nv 10x ea 10x ea 10x ea np np np np np np   TA + LTR nv  np 5"x10 ea np np np np np np   Pball APT/PPT nv Blue 10x ea Np, p! np np np np np np np   pball lumbar flexion nv 10"x5 10" x 5 np np np np np np np   Seated sciatic n glide, 3 pumps  10x ea 10x ea np np np np np np np   Ab brace + mini squats   10x np np np np np np np   TA + TB shoulder ext     3"x10, org np nv np np np   TA+ TB low rows     3"x10, org np nv np np reviewed                                                                                                  Modalities           10/15/18   none          CP                    Assessment: Tolerated treatment well  Patient would benefit from continued PT Pt has significant soft tissue tension in R rhomboids, thoracic and lumbar paraspinals, R piriformis, R adductors, and R hip flexors  Pt has relief with manual therapy to above areas  Pt had good tolerance to initiation of handlebar IASTM to lift R scapula and relieve R rhomboids  Pt reports feeling better at end of session  Plan: Continue per plan of care  Progress treatment as tolerated

## 2018-10-16 ENCOUNTER — ROUTINE PRENATAL (OUTPATIENT)
Dept: OBGYN CLINIC | Facility: MEDICAL CENTER | Age: 28
End: 2018-10-16
Payer: COMMERCIAL

## 2018-10-16 VITALS — SYSTOLIC BLOOD PRESSURE: 112 MMHG | DIASTOLIC BLOOD PRESSURE: 68 MMHG | WEIGHT: 222 LBS | BODY MASS INDEX: 33.75 KG/M2

## 2018-10-16 DIAGNOSIS — O34.219 H/O CESAREAN SECTION COMPLICATING PREGNANCY: ICD-10-CM

## 2018-10-16 DIAGNOSIS — Z34.93 THIRD TRIMESTER PREGNANCY: Primary | ICD-10-CM

## 2018-10-16 DIAGNOSIS — Z3A.38 38 WEEKS GESTATION OF PREGNANCY: ICD-10-CM

## 2018-10-16 PROCEDURE — 99213 OFFICE O/P EST LOW 20 MIN: CPT | Performed by: OBSTETRICS & GYNECOLOGY

## 2018-10-16 NOTE — PROGRESS NOTES
Suha Rosenberg is a 25y o  year old [id number] at 38w1d for routine prenatal visit    + FM, no vaginal bleeding, contractions, or LOF  Complaints: No   Most recent ultrasound and labs reviewed    GBS negative  RLTCS scheduled 10/22/18

## 2018-10-17 ENCOUNTER — OFFICE VISIT (OUTPATIENT)
Dept: PHYSICAL THERAPY | Facility: CLINIC | Age: 28
End: 2018-10-17
Payer: COMMERCIAL

## 2018-10-17 DIAGNOSIS — M89.9 PUBIC BONE PAIN: Primary | ICD-10-CM

## 2018-10-17 DIAGNOSIS — M79.604 LOW BACK PAIN RADIATING TO RIGHT LOWER EXTREMITY: ICD-10-CM

## 2018-10-17 DIAGNOSIS — M54.50 LOW BACK PAIN RADIATING TO RIGHT LOWER EXTREMITY: ICD-10-CM

## 2018-10-17 PROCEDURE — 97140 MANUAL THERAPY 1/> REGIONS: CPT | Performed by: PHYSICAL THERAPIST

## 2018-10-17 NOTE — PROGRESS NOTES
Daily Note     Today's date: 10/17/2018  Patient name: Jenn Reyes  : 1990  MRN: [id number]  Referring provider: MARTINEZ Marie  Dx:   Encounter Diagnosis     ICD-10-CM    1  Pubic bone pain M89 9    2  Low back pain radiating to right lower extremity M54 5                   Subjective: Pt states that L side is in a lot of pain after altered gait pattern with recent foot issue  Pt had difficulty with bed mobility last night and states that she is experiencing L sciatic symptoms as well as thoracolumbar tension bilaterally  Pt denies headaches but states she is generally fatigued today with minimal rest last night        Objective: See treatment diary below      Precautions: 3rd trimester pregnancy, headaches    Daily Treatment Diary     Manual  10/17/18         10/15/18   BP pre tx 104/68 mmHg         112/72 mmHg   MET abd/ad np         np   R hip PROM PRN ED         np   R piriformis TPR PRN ED         ED   R lumbar PSM STM in L sidelying over pillow ED         ED   Pelvic compression w/ rhythmic rotation ED         ED   sidelying manual sciatic n glide B ED         ED, R only   sidelying QL stretch w/ med/inf glide on iliac crest ED         ED   TPR to L OI and piriformis in sidelying ED         np   TPR to R hip flexor and adductors ED         ED   TPR and ROM to R scapula and rhomboids ED B         ED, w/ IASTM   measurements np            ktape to B SI joints and over lumbar PSM np                Exercise Diary  10/17/18         10/15/18   TA + hip add in reclined np         np   Reclined sciatic n glide w towel np         np   Side stepping np         np   TA in reclined np         np   TA + hip TB abd np         np   TA + BKFO np         np   TA + LTR np         np   Pball APT/PPT np         np   pball lumbar flexion np         np   Seated sciatic n glide, 3 pumps np         np   Ab brace + mini squats np         np   TA + TB shoulder ext np         np   TA+ TB low rows np         reviewed Modalities  10/17/18         10/15/18   none          CP                        Assessment: Tolerated treatment well  Patient would benefit from continued PT Pt has significant soft tissue tension throughout bilateral hip girdles and shoulder girdles  Pt reports feeling better at the end of session, with pain reduced to 4/10  Pt stands with more upright posture and states that she feels looser  Plan: Continue per plan of care  Progress treatment as tolerated  Plan to see pt for one more visit prior to delivery

## 2018-10-19 ENCOUNTER — ANESTHESIA EVENT (INPATIENT)
Dept: LABOR AND DELIVERY | Facility: HOSPITAL | Age: 28
DRG: 540 | End: 2018-10-19
Payer: COMMERCIAL

## 2018-10-19 ENCOUNTER — OFFICE VISIT (OUTPATIENT)
Dept: PHYSICAL THERAPY | Facility: CLINIC | Age: 28
End: 2018-10-19
Payer: COMMERCIAL

## 2018-10-19 DIAGNOSIS — M54.50 LOW BACK PAIN RADIATING TO RIGHT LOWER EXTREMITY: ICD-10-CM

## 2018-10-19 DIAGNOSIS — M79.604 LOW BACK PAIN RADIATING TO RIGHT LOWER EXTREMITY: ICD-10-CM

## 2018-10-19 DIAGNOSIS — M89.9 PUBIC BONE PAIN: Primary | ICD-10-CM

## 2018-10-19 PROCEDURE — 97140 MANUAL THERAPY 1/> REGIONS: CPT | Performed by: PHYSICAL THERAPIST

## 2018-10-19 NOTE — PROGRESS NOTES
Daily Note     Today's date: 10/19/2018  Patient name: Adia Pedro  : 1990  MRN: [id number]  Referring provider: MARTINEZ Zamudio  Dx:   Encounter Diagnosis     ICD-10-CM    1  Pubic bone pain M89 9    2  Low back pain radiating to right lower extremity M54 5                   Subjective: Pt states that she has significant pain in R rhomboids, R pubis, and R thoracolumbar junction  Pt rates pain as 6/10 at start of session        Objective: See treatment diary below      Precautions: 3rd trimester pregnancy, headaches    Daily Treatment Diary     Manual  10/17/18 10/19/18        10/15/18   BP pre tx 104/68 mmHg 100/64 mmHg        112/72 mmHg   MET abd/ad np np        np   R hip PROM PRN ED ED        np   R piriformis TPR PRN ED np        ED   R lumbar PSM STM in L sidelying over pillow ED ED        ED   Pelvic compression w/ rhythmic rotation ED ED        ED   sidelying manual sciatic n glide B ED np        ED, R only   sidelying QL stretch w/ med/inf glide on iliac crest ED ED        ED   TPR to L OI and piriformis in sidelying ED np        np   TPR to R hip flexor and adductors ED ED        ED   TPR and ROM to R scapula and rhomboids ED B ED        ED, w/ IASTM   measurements np np           ktape to B SI joints and over lumbar PSM np np               Exercise Diary  10/17/18 10/19/18        10/15/18   TA + hip add in reclined np np        np   Reclined sciatic n glide w towel np np        np   Side stepping np np        np   TA in reclined np np        np   TA + hip TB abd np np        np   TA + BKFO np np        np   TA + LTR np np        np   Pball APT/PPT np np        np   pball lumbar flexion np np        np   Seated sciatic n glide, 3 pumps np np        np   Ab brace + mini squats np np        np   TA + TB shoulder ext np np        np   TA+ TB low rows np np        reviewed                                                                                                  Modalities  10/17/18 10/19/18        10/15/18   none          CP                      Assessment: Tolerated treatment well  Patient will deliver via  on 10/22/18 and will be discharged from therapy  Pt had good tolerance to manuals and noted decreased tension throughout rhomboids at end of session, no change in pubic pain  Plan: Discharge secondary to pt is delivering baby

## 2018-10-22 ENCOUNTER — HOSPITAL ENCOUNTER (INPATIENT)
Facility: HOSPITAL | Age: 28
LOS: 3 days | Discharge: HOME | DRG: 540 | End: 2018-10-25
Attending: OBSTETRICS & GYNECOLOGY | Admitting: OBSTETRICS & GYNECOLOGY
Payer: COMMERCIAL

## 2018-10-22 ENCOUNTER — ANESTHESIA (INPATIENT)
Dept: LABOR AND DELIVERY | Facility: HOSPITAL | Age: 28
DRG: 540 | End: 2018-10-22
Payer: COMMERCIAL

## 2018-10-22 DIAGNOSIS — Z3A.39 39 WEEKS GESTATION OF PREGNANCY: Primary | ICD-10-CM

## 2018-10-22 DIAGNOSIS — Z98.891 S/P CESAREAN SECTION: ICD-10-CM

## 2018-10-22 PROBLEM — O40.9XX0 POLYHYDRAMNIOS: Status: ACTIVE | Noted: 2018-10-22

## 2018-10-22 LAB
ABO GROUP BLD: NORMAL
BASE EXCESS BLDCOV CALC-SCNC: -1.4 MMOL/L (ref 1–9)
BASOPHILS # BLD AUTO: 0.03 THOUSANDS/ΜL (ref 0–0.1)
BASOPHILS NFR BLD AUTO: 0 % (ref 0–1)
BLD GP AB SCN SERPL QL: NEGATIVE
EOSINOPHIL # BLD AUTO: 0.27 THOUSAND/ΜL (ref 0–0.61)
EOSINOPHIL NFR BLD AUTO: 2 % (ref 0–6)
ERYTHROCYTE [DISTWIDTH] IN BLOOD BY AUTOMATED COUNT: 14.2 % (ref 11.6–15.1)
HCO3 BLDCOV-SCNC: 22.4 MMOL/L (ref 12.2–28.6)
HCT VFR BLD AUTO: 38.1 % (ref 34.8–46.1)
HGB BLD-MCNC: 13 G/DL (ref 11.5–15.4)
IMM GRANULOCYTES # BLD AUTO: 0.14 THOUSAND/UL (ref 0–0.2)
IMM GRANULOCYTES NFR BLD AUTO: 1 % (ref 0–2)
LYMPHOCYTES # BLD AUTO: 2.85 THOUSANDS/ΜL (ref 0.6–4.47)
LYMPHOCYTES NFR BLD AUTO: 23 % (ref 14–44)
MCH RBC QN AUTO: 29.7 PG (ref 26.8–34.3)
MCHC RBC AUTO-ENTMCNC: 34.1 G/DL (ref 31.4–37.4)
MCV RBC AUTO: 87 FL (ref 82–98)
MONOCYTES # BLD AUTO: 0.69 THOUSAND/ΜL (ref 0.17–1.22)
MONOCYTES NFR BLD AUTO: 6 % (ref 4–12)
NEUTROPHILS # BLD AUTO: 8.42 THOUSANDS/ΜL (ref 1.85–7.62)
NEUTS SEG NFR BLD AUTO: 68 % (ref 43–75)
NRBC BLD AUTO-RTO: 0 /100 WBCS
OXYHGB MFR BLDCOV: 89.9 %
PCO2 BLDCOV: 35.4 MM HG (ref 27–43)
PH BLDCOV: 7.42 [PH] (ref 7.19–7.49)
PLATELET # BLD AUTO: 240 THOUSANDS/UL (ref 149–390)
PMV BLD AUTO: 9.5 FL (ref 8.9–12.7)
PO2 BLDCOV: 50.6 MM HG (ref 15–45)
RBC # BLD AUTO: 4.38 MILLION/UL (ref 3.81–5.12)
RH BLD: POSITIVE
SAO2 % BLDCOV: 19.9 ML/DL
SPECIMEN EXPIRATION DATE: NORMAL
WBC # BLD AUTO: 12.4 THOUSAND/UL (ref 4.31–10.16)

## 2018-10-22 PROCEDURE — 59514 CESAREAN DELIVERY ONLY: CPT | Performed by: OBSTETRICS & GYNECOLOGY

## 2018-10-22 PROCEDURE — 86850 RBC ANTIBODY SCREEN: CPT | Performed by: OBSTETRICS & GYNECOLOGY

## 2018-10-22 PROCEDURE — 94762 N-INVAS EAR/PLS OXIMTRY CONT: CPT

## 2018-10-22 PROCEDURE — 82805 BLOOD GASES W/O2 SATURATION: CPT | Performed by: OBSTETRICS & GYNECOLOGY

## 2018-10-22 PROCEDURE — 86900 BLOOD TYPING SEROLOGIC ABO: CPT | Performed by: OBSTETRICS & GYNECOLOGY

## 2018-10-22 PROCEDURE — 85025 COMPLETE CBC W/AUTO DIFF WBC: CPT | Performed by: OBSTETRICS & GYNECOLOGY

## 2018-10-22 PROCEDURE — 4A1HXCZ MONITORING OF PRODUCTS OF CONCEPTION, CARDIAC RATE, EXTERNAL APPROACH: ICD-10-PCS | Performed by: OBSTETRICS & GYNECOLOGY

## 2018-10-22 PROCEDURE — 86592 SYPHILIS TEST NON-TREP QUAL: CPT | Performed by: OBSTETRICS & GYNECOLOGY

## 2018-10-22 PROCEDURE — 86901 BLOOD TYPING SEROLOGIC RH(D): CPT | Performed by: OBSTETRICS & GYNECOLOGY

## 2018-10-22 RX ORDER — OXYCODONE HYDROCHLORIDE AND ACETAMINOPHEN 5; 325 MG/1; MG/1
1 TABLET ORAL EVERY 6 HOURS PRN
Status: DISCONTINUED | OUTPATIENT
Start: 2018-10-22 | End: 2018-10-25 | Stop reason: HOSPADM

## 2018-10-22 RX ORDER — ONDANSETRON 2 MG/ML
INJECTION INTRAMUSCULAR; INTRAVENOUS AS NEEDED
Status: DISCONTINUED | OUTPATIENT
Start: 2018-10-22 | End: 2018-10-22 | Stop reason: SURG

## 2018-10-22 RX ORDER — TRISODIUM CITRATE DIHYDRATE AND CITRIC ACID MONOHYDRATE 500; 334 MG/5ML; MG/5ML
30 SOLUTION ORAL ONCE
Status: COMPLETED | OUTPATIENT
Start: 2018-10-22 | End: 2018-10-22

## 2018-10-22 RX ORDER — OXYCODONE HYDROCHLORIDE AND ACETAMINOPHEN 5; 325 MG/1; MG/1
2 TABLET ORAL EVERY 4 HOURS PRN
Status: DISCONTINUED | OUTPATIENT
Start: 2018-10-23 | End: 2018-10-25 | Stop reason: HOSPADM

## 2018-10-22 RX ORDER — EPHEDRINE SULFATE 50 MG/ML
INJECTION, SOLUTION INTRAVENOUS AS NEEDED
Status: DISCONTINUED | OUTPATIENT
Start: 2018-10-22 | End: 2018-10-22 | Stop reason: SURG

## 2018-10-22 RX ORDER — ONDANSETRON 2 MG/ML
4 INJECTION INTRAMUSCULAR; INTRAVENOUS ONCE AS NEEDED
Status: DISCONTINUED | OUTPATIENT
Start: 2018-10-22 | End: 2018-10-25 | Stop reason: HOSPADM

## 2018-10-22 RX ORDER — BUPIVACAINE HYDROCHLORIDE 7.5 MG/ML
INJECTION, SOLUTION INTRASPINAL
Status: COMPLETED
Start: 2018-10-22 | End: 2018-10-22

## 2018-10-22 RX ORDER — MORPHINE SULFATE 0.5 MG/ML
INJECTION, SOLUTION EPIDURAL; INTRATHECAL; INTRAVENOUS
Status: DISPENSED
Start: 2018-10-22 | End: 2018-10-22

## 2018-10-22 RX ORDER — KETOROLAC TROMETHAMINE 30 MG/ML
INJECTION, SOLUTION INTRAMUSCULAR; INTRAVENOUS AS NEEDED
Status: DISCONTINUED | OUTPATIENT
Start: 2018-10-22 | End: 2018-10-22 | Stop reason: SURG

## 2018-10-22 RX ORDER — DOCUSATE SODIUM 100 MG/1
100 CAPSULE, LIQUID FILLED ORAL 2 TIMES DAILY
Status: DISCONTINUED | OUTPATIENT
Start: 2018-10-22 | End: 2018-10-25 | Stop reason: HOSPADM

## 2018-10-22 RX ORDER — DEXAMETHASONE SODIUM PHOSPHATE 10 MG/ML
8 INJECTION, SOLUTION INTRAMUSCULAR; INTRAVENOUS ONCE AS NEEDED
Status: ACTIVE | OUTPATIENT
Start: 2018-10-22 | End: 2018-10-23

## 2018-10-22 RX ORDER — ACETAMINOPHEN 325 MG/1
650 TABLET ORAL EVERY 6 HOURS PRN
Status: DISCONTINUED | OUTPATIENT
Start: 2018-10-22 | End: 2018-10-25 | Stop reason: HOSPADM

## 2018-10-22 RX ORDER — DIPHENHYDRAMINE HYDROCHLORIDE 50 MG/ML
25 INJECTION INTRAMUSCULAR; INTRAVENOUS EVERY 6 HOURS PRN
Status: ACTIVE | OUTPATIENT
Start: 2018-10-22 | End: 2018-10-23

## 2018-10-22 RX ORDER — BUPIVACAINE HYDROCHLORIDE 7.5 MG/ML
INJECTION, SOLUTION INTRASPINAL AS NEEDED
Status: DISCONTINUED | OUTPATIENT
Start: 2018-10-22 | End: 2018-10-22 | Stop reason: SURG

## 2018-10-22 RX ORDER — METOCLOPRAMIDE HYDROCHLORIDE 5 MG/ML
5 INJECTION INTRAMUSCULAR; INTRAVENOUS EVERY 6 HOURS PRN
Status: ACTIVE | OUTPATIENT
Start: 2018-10-22 | End: 2018-10-23

## 2018-10-22 RX ORDER — SODIUM CHLORIDE, SODIUM LACTATE, POTASSIUM CHLORIDE, CALCIUM CHLORIDE 600; 310; 30; 20 MG/100ML; MG/100ML; MG/100ML; MG/100ML
125 INJECTION, SOLUTION INTRAVENOUS CONTINUOUS
Status: DISCONTINUED | OUTPATIENT
Start: 2018-10-22 | End: 2018-10-25 | Stop reason: HOSPADM

## 2018-10-22 RX ORDER — DIAPER,BRIEF,INFANT-TODD,DISP
1 EACH MISCELLANEOUS 4 TIMES DAILY PRN
Status: DISCONTINUED | OUTPATIENT
Start: 2018-10-22 | End: 2018-10-25 | Stop reason: HOSPADM

## 2018-10-22 RX ORDER — MORPHINE SULFATE 1 MG/ML
INJECTION, SOLUTION EPIDURAL; INTRATHECAL; INTRAVENOUS AS NEEDED
Status: DISCONTINUED | OUTPATIENT
Start: 2018-10-22 | End: 2018-10-22 | Stop reason: SURG

## 2018-10-22 RX ORDER — IBUPROFEN 600 MG/1
600 TABLET ORAL EVERY 6 HOURS PRN
Status: DISCONTINUED | OUTPATIENT
Start: 2018-10-22 | End: 2018-10-25 | Stop reason: HOSPADM

## 2018-10-22 RX ORDER — FENTANYL CITRATE/PF 50 MCG/ML
25 SYRINGE (ML) INJECTION
Status: DISCONTINUED | OUTPATIENT
Start: 2018-10-22 | End: 2018-10-25 | Stop reason: HOSPADM

## 2018-10-22 RX ORDER — KETOROLAC TROMETHAMINE 30 MG/ML
30 INJECTION, SOLUTION INTRAMUSCULAR; INTRAVENOUS ONCE
Status: DISCONTINUED | OUTPATIENT
Start: 2018-10-22 | End: 2018-10-23

## 2018-10-22 RX ORDER — NALBUPHINE HCL 10 MG/ML
2 AMPUL (ML) INJECTION
Status: ACTIVE | OUTPATIENT
Start: 2018-10-22 | End: 2018-10-23

## 2018-10-22 RX ORDER — SODIUM CHLORIDE 9 MG/ML
125 INJECTION, SOLUTION INTRAVENOUS CONTINUOUS
Status: DISCONTINUED | OUTPATIENT
Start: 2018-10-22 | End: 2018-10-22

## 2018-10-22 RX ORDER — DIPHENHYDRAMINE HCL 25 MG
25 TABLET ORAL EVERY 6 HOURS PRN
Status: DISCONTINUED | OUTPATIENT
Start: 2018-10-22 | End: 2018-10-25 | Stop reason: HOSPADM

## 2018-10-22 RX ORDER — KETOROLAC TROMETHAMINE 30 MG/ML
15 INJECTION, SOLUTION INTRAMUSCULAR; INTRAVENOUS EVERY 6 HOURS
Status: DISCONTINUED | OUTPATIENT
Start: 2018-10-22 | End: 2018-10-23

## 2018-10-22 RX ORDER — NALOXONE HYDROCHLORIDE 0.4 MG/ML
0.1 INJECTION, SOLUTION INTRAMUSCULAR; INTRAVENOUS; SUBCUTANEOUS
Status: ACTIVE | OUTPATIENT
Start: 2018-10-22 | End: 2018-10-23

## 2018-10-22 RX ORDER — OXYTOCIN/RINGER'S LACTATE 30/500 ML
PLASTIC BAG, INJECTION (ML) INTRAVENOUS CONTINUOUS PRN
Status: DISCONTINUED | OUTPATIENT
Start: 2018-10-22 | End: 2018-10-22 | Stop reason: SURG

## 2018-10-22 RX ORDER — ONDANSETRON 2 MG/ML
4 INJECTION INTRAMUSCULAR; INTRAVENOUS EVERY 4 HOURS PRN
Status: ACTIVE | OUTPATIENT
Start: 2018-10-22 | End: 2018-10-23

## 2018-10-22 RX ORDER — ONDANSETRON 2 MG/ML
4 INJECTION INTRAMUSCULAR; INTRAVENOUS EVERY 8 HOURS PRN
Status: DISCONTINUED | OUTPATIENT
Start: 2018-10-22 | End: 2018-10-25 | Stop reason: HOSPADM

## 2018-10-22 RX ORDER — SIMETHICONE 80 MG
80 TABLET,CHEWABLE ORAL 4 TIMES DAILY PRN
Status: DISCONTINUED | OUTPATIENT
Start: 2018-10-22 | End: 2018-10-25 | Stop reason: HOSPADM

## 2018-10-22 RX ORDER — CALCIUM CARBONATE 200(500)MG
1000 TABLET,CHEWABLE ORAL 3 TIMES DAILY PRN
Status: DISCONTINUED | OUTPATIENT
Start: 2018-10-22 | End: 2018-10-25 | Stop reason: HOSPADM

## 2018-10-22 RX ORDER — OXYCODONE HYDROCHLORIDE AND ACETAMINOPHEN 5; 325 MG/1; MG/1
1 TABLET ORAL EVERY 4 HOURS PRN
Status: DISCONTINUED | OUTPATIENT
Start: 2018-10-23 | End: 2018-10-25 | Stop reason: HOSPADM

## 2018-10-22 RX ORDER — HYDROMORPHONE HCL/PF 1 MG/ML
1 SYRINGE (ML) INJECTION EVERY 2 HOUR PRN
Status: DISCONTINUED | OUTPATIENT
Start: 2018-10-23 | End: 2018-10-25 | Stop reason: HOSPADM

## 2018-10-22 RX ADMIN — ONDANSETRON HYDROCHLORIDE 4 MG: 2 INJECTION, SOLUTION INTRAVENOUS at 07:46

## 2018-10-22 RX ADMIN — KETOROLAC TROMETHAMINE 15 MG: 30 INJECTION, SOLUTION INTRAMUSCULAR at 20:07

## 2018-10-22 RX ADMIN — KETOROLAC TROMETHAMINE 30 MG: 30 INJECTION, SOLUTION INTRAMUSCULAR at 08:54

## 2018-10-22 RX ADMIN — Medication 250 MILLI-UNITS/MIN: at 08:14

## 2018-10-22 RX ADMIN — ONDANSETRON 4 MG: 2 INJECTION INTRAMUSCULAR; INTRAVENOUS at 07:15

## 2018-10-22 RX ADMIN — Medication: at 08:47

## 2018-10-22 RX ADMIN — OXYCODONE HYDROCHLORIDE AND ACETAMINOPHEN 1 TABLET: 5; 325 TABLET ORAL at 18:43

## 2018-10-22 RX ADMIN — SODIUM CITRATE AND CITRIC ACID MONOHYDRATE 30 ML: 500; 334 SOLUTION ORAL at 07:30

## 2018-10-22 RX ADMIN — IBUPROFEN 600 MG: 600 TABLET, FILM COATED ORAL at 23:30

## 2018-10-22 RX ADMIN — SODIUM CHLORIDE, SODIUM LACTATE, POTASSIUM CHLORIDE, AND CALCIUM CHLORIDE: .6; .31; .03; .02 INJECTION, SOLUTION INTRAVENOUS at 08:56

## 2018-10-22 RX ADMIN — EPHEDRINE SULFATE 5 MG: 50 INJECTION, SOLUTION INTRAMUSCULAR; INTRAVENOUS; SUBCUTANEOUS at 07:46

## 2018-10-22 RX ADMIN — SODIUM CHLORIDE, SODIUM LACTATE, POTASSIUM CHLORIDE, AND CALCIUM CHLORIDE 999 ML/HR: .6; .31; .03; .02 INJECTION, SOLUTION INTRAVENOUS at 05:50

## 2018-10-22 RX ADMIN — CEFAZOLIN SODIUM 2000 MG: 2 SOLUTION INTRAVENOUS at 07:53

## 2018-10-22 RX ADMIN — DOCUSATE SODIUM 100 MG: 100 CAPSULE, LIQUID FILLED ORAL at 17:43

## 2018-10-22 RX ADMIN — BUPIVACAINE HYDROCHLORIDE IN DEXTROSE 2 ML: 7.5 INJECTION, SOLUTION SUBARACHNOID at 07:47

## 2018-10-22 RX ADMIN — PHENYLEPHRINE HYDROCHLORIDE 30 MCG/MIN: 10 INJECTION INTRAVENOUS at 07:46

## 2018-10-22 RX ADMIN — EPHEDRINE SULFATE 5 MG: 50 INJECTION, SOLUTION INTRAMUSCULAR; INTRAVENOUS; SUBCUTANEOUS at 08:09

## 2018-10-22 RX ADMIN — KETOROLAC TROMETHAMINE 15 MG: 30 INJECTION, SOLUTION INTRAMUSCULAR at 15:16

## 2018-10-22 RX ADMIN — SODIUM CHLORIDE, SODIUM LACTATE, POTASSIUM CHLORIDE, AND CALCIUM CHLORIDE: .6; .31; .03; .02 INJECTION, SOLUTION INTRAVENOUS at 08:09

## 2018-10-22 RX ADMIN — OXYCODONE HYDROCHLORIDE AND ACETAMINOPHEN 1 TABLET: 5; 325 TABLET ORAL at 12:40

## 2018-10-22 RX ADMIN — EPHEDRINE SULFATE 5 MG: 50 INJECTION, SOLUTION INTRAMUSCULAR; INTRAVENOUS; SUBCUTANEOUS at 07:51

## 2018-10-22 RX ADMIN — MORPHINE SULFATE 0.15 MG: 1 INJECTION, SOLUTION EPIDURAL; INTRATHECAL; INTRAVENOUS at 07:47

## 2018-10-22 NOTE — DISCHARGE INSTRUCTIONS
Section   WHAT YOU SHOULD KNOW:   A  delivery, or , is abdominal surgery to deliver your baby  There are many reasons you may need a   · A  may be scheduled before labor if you had a  with your last baby  It may be scheduled if your baby is not positioned normally, or you are pregnant with more than 1 baby  · Your caregiver may perform an emergency  during labor to prevent life-threatening complications for you or your baby  A  may be done if your cervix does not dilate after several hours of active labor  · Other reasons for a  include maternal infections and problems with the placenta  AFTER YOU LEAVE:   Medicines:   · Prescription pain medicine  may be given  Ask how to take this medicine safely  · Acetaminophen  decreases pain and fever  It is available without a doctor's order  Ask how much to take and how often to take it  Follow directions  Acetaminophen can cause liver damage if not taken correctly  · NSAIDs  help decrease swelling and pain or fever  This medicine is available with or without a doctor's order  NSAIDs can cause stomach bleeding or kidney problems in certain people  If you take blood thinner medicine, always ask your obstetrician if NSAIDs are safe for you  Always read the medicine label and follow directions  · Take your medicine as directed  Contact your obstetrician (OB) if you think your medicine is not helping or if you have side effects  Tell him if you are allergic to any medicine  Keep a list of the medicines, vitamins, and herbs you take  Include the amounts, and when and why you take them  Bring the list or the pill bottles to follow-up visits  Carry your medicine list with you in case of an emergency  Follow up with your OB as directed: You may need to return to have your stitches or staples removed   Write down your questions so you remember to ask them during your visits  Wound care:  Carefully wash your wound with soap and water every day  Keep your wound clean and dry  Wear loose, comfortable clothes that do not rub against your wound  Ask your OB about bathing and showering  Drink plenty of liquids: You can lower your risk for a blood clot if you drink plenty of liquids  Ask how much liquid to drink each day and which liquids are best for you  Limit activity until you have fully recovered from surgery:   · Ask when it is safe for you to drive, walk up stairs, lift heavy objects, and have sex  · Ask when it is okay to exercise, and what types of exercise to do  Start slowly and do more as you get stronger  Contact your OB if:   · You have heavy vaginal bleeding that fills 1 or more sanitary pads in 1 hour  · You have a fever  · Your incision is swollen, red, or draining pus  · You have questions or concerns about yourself or your baby  Seek care immediately or call 911 if:   · Blood soaks through your bandage  · Your stitches come apart  · You feel lightheaded, short of breath, and have chest pain  · You cough up blood  · Your arm or leg feels warm, tender, and painful  It may look swollen and red  © 2014 [address] is for End User's use only and may not be sold, redistributed or otherwise used for commercial purposes  All illustrations and images included in CareNotes® are the copyrighted property of A D A M [employer] Inc  or Ean Delatorre  The above information is an  only  It is not intended as medical advice for individual conditions or treatments  Talk to your doctor, nurse or pharmacist before following any medical regimen to see if it is safe and effective for you

## 2018-10-22 NOTE — OP NOTE
OPERATIVE REPORT  PATIENT NAME: Jude Martinez    :  1990  MRN: [id number]  Pt Location: [facility] OR ROOM 01    SURGERY DATE: 10/22/2018    Surgeon(s) and Role:     * Hilary Matson MD - Primary     * Eren Park MD - Assisting    Preop Diagnosis:  Prior  section x 2  Pregancy at 39w0d  Polyhydramnios  Single fetus    Procedure(s) (LRB):   SECTION () REPEAT (Bilateral)    Specimen(s):  ID Type Source Tests Collected by Time Destination   1 : for pathology Tissue Placenta TISSUE EXAM Hilary Matson MD 10/22/2018 7057    A :  Cord Blood Cord BLOOD GAS, VENOUS, CORD, BLOOD GAS, ARTERIAL, CORD Hilary Matson MD 10/22/2018 0704    B :  Tissue (Placenta on Hold) OB Only Placenta PLACENTA IN STORAGE Hilary Matson MD 10/22/2018 0704    C :  Cord Blood Cord BLOOD GAS, ARTERIAL, CORD Hilary Matson MD 10/22/2018 0704        Estimated Blood Loss:   800 mL    Drains:  Urethral Catheter Latex 16 Fr  (Active)   Amt returned on insertion(mL) 150 mL 10/22/2018  8:04 AM   Site Assessment Clean 10/22/2018  8:04 AM   Collection Container Standard drainage bag 10/22/2018  8:04 AM   Securement Method Securing device (Describe) 10/22/2018  8:04 AM   Number of days: 0       Anesthesia Type:   Spinal Anesthesia    Operative Indications:  Prior  section x 2    Operative Findings:  1  Delivery of viable female on 10/22/18 at 0823, weight 6lbs 13oz; Apgar scores of 8 at one minute and 9 at five minutes  2  Normal appearing placenta with centrally-inserted 3 vessel cord  3  Clear amniotic fluid  4  Grossly normal uterus, tubes, and ovaries    Complications:   None    Procedure and Technique:    The patient was taken to the operating room where she was properly identified to the OR staff and attending physician  Fetal heart tones were appreciated and found to be appropriate  A Cruz catheter was aseptically inserted and SCDs were placed    The abdomen was prepped with Chloraprep and following appropriate drying time, the patient was draped in the usual sterile manner for a Pfannenstiel incision  The patient had received Ancef 2g IV pre-operatively for prophylaxis  A Time Out was held and the above information confirmed  The patient was identified as Sagrario Guerra and the procedure verified as  Delivery  A Pfannenstiel incision was made and carried down through the underlying subcutaneous tissue to the fascia using a scalpel  Rectus fascia was then incised in the midline and extended laterally using Siddiqui scissors  The superior edge of the fascia was grasped with Kocher clamps, tented upward, and the underlying muscle was bluntly dissected off  The inferior edge was grasped with Kocher clamps and cleared in similar fashion  All anatomic layers were well-demarcated  The rectus muscles were  and the peritoneum was identified and subsequently entered and extended longitudinally with blunt dissection  The vesicouterine peritoneum was identified and a bladder flap was created using Metzenbaum scissors  The bladder blade was inserted  A low transverse uterine incision was made with the scalpel and extended laterally with blunt dissection  The amnion was entered sharply  Surgeons hand was inserted through the hysterotomy and the fetal head was palpated, elevated, and delivered through the uterine incision with the assistance of fundal pressure  Baby had spontaneous cry with good color and tone  The umbilical cord was clamped and cut  The infant was handed off to the  providers  Arterial and venous cord gases, cord blood, and a segment of umbilical cord were obtained for evaluation and promptly sent to the lab  The placenta delivered spontaneously with uterine fundal massage and was noted to have a centrally inserted 3 vessel cord   This was also sent to storage     The uterus was exteriorized and a moist lap sponge was used to clear the cavity of clots and products of conception  The uterine incision was closed with a running locked suture of 0 Vicryl  A second layer of the same suture was used to imbricate the first   Good hemostasis was confirmed upon uterine closure  Warmed normal saline solution was used to irrigate the posterior culdesac and the uterus was returned to the abdomen  The paracolic gutters were inspected and cleared of all clots and debris with moist lap sponges  The peritoneum and muscle layer was closed with a running suture of 0 chromic  The fascia was closed with a running suture of 0 Vicryl  Subcutaneous tissues were closed with 0 Vicryl suture  The skin was closed with 4-0 Monocryl in a subcuticular fashion  Sterile dressing was applied with histocryl, jose e and abdominal pad  At the conclusion of the procedure, all needle, sponge, and instrument counts were noted to be correct x2  The patient tolerated the procedure well and was transferred to her the recovery room in stable condition  Dr Jeremy Forrest was present and participated in all key portions of the case      Patient Disposition:  PACU     SIGNATURE: Albertina Stacy MD  DATE: October 22, 2018  TIME: 9:22 AM

## 2018-10-22 NOTE — ANESTHESIA PREPROCEDURE EVALUATION
Review of Systems/Medical History  Patient summary reviewed  Chart reviewed      Cardiovascular  Exercise tolerance (METS): >4,  Dysrhythmias (svt) ,    Pulmonary  Negative pulmonary ROS        GI/Hepatic    Liver disease ,             Endo/Other  Negative endo/other ROS      GYN       Hematology  Negative hematology ROS      Musculoskeletal  Negative musculoskeletal ROS        Neurology    Headaches,    Psychology           Physical Exam    Airway    Mallampati score: II  TM Distance: <3 FB  Neck ROM: full     Dental       Cardiovascular  Rhythm: regular, Rate: normal,     Pulmonary  Breath sounds clear to auscultation,     Other Findings  Retainer        Anesthesia Plan  ASA Score- 2     Anesthesia Type- spinal with ASA Monitors  Additional Monitors:   Airway Plan:         Plan Factors- Patient instructed to abstain from smoking on day of procedure  Patient did not smoke on day of surgery  Induction- intravenous  Postoperative Plan-     Informed Consent- Anesthetic plan and risks discussed with patient

## 2018-10-22 NOTE — PROGRESS NOTES
Progress Note - OB/GYN  Post-Op Check  Princess Hawkins 25 y o  female MRN: [id number]  Unit/Bed#: L&D 309-01 Encounter: [id number]      A/P:  Post-Op day # 0 status post Repeat low transverse  section  1) Routine Post-op Care: Continue   2) Diet: Advance as tolerated  3) Huynh: D/c 12 hrs post-op then f/u 1st void  4) UOP: 125 cc charted plus 175 cc in Huynh bag over 6 hrs; color: concentrated yellow  5) Labs: f/u CBC in am  6) DVT ppx: Cont   SCDs   7)  Dressing: Will remove tomorrow am  8) Monitor vital signs    SUBJECTIVE:  Pain: minimal  Tolerating Oral Intake: is tolerating PO liquids and solids  Voiding: huynh inserted  Flatus: no  Bowel Movement: no  Ambulating: No (huynh still inserted)  Breastfeeding: Breastfeeding  Chest Pain: no  Shortness of Breath: no  Leg Pain/Discomfort: no  Lochia: moderate    Other:       OBJECTIVE:     Vitals:   Vitals:    10/22/18 1200 10/22/18 1300 10/22/18 1400 10/22/18 1516   BP: 92/55 (!) 82/53 (!) 82/45 (!) 89/52   BP Location: Right arm  Right arm    Pulse: 59 60 62 69   Resp:    Temp: 97 8 °F (36 6 °C) 97 9 °F (36 6 °C) 98 °F (36 7 °C) 98 5 °F (36 9 °C)   TempSrc: Oral Oral Oral Oral   SpO2: 97% 97% 97% 95%   Weight:       Height:           I/O       10/20 07 - 10/21 0700 10/21 07 - 10/22 0700 10/22 07 - 10/23 0700    I V  (mL/kg)   2300 (22 8)    Total Intake(mL/kg)   2300 (22 8)    Urine (mL/kg/hr)   600 (0 6)    Blood   800    Total Output     1400    Net     +900                 Physical exam:  General: Resting comfortably in bed  Lungs: CTAB, no wheezing, rales, crackles  Heart: RRR, no murmurs, rubs, gallops  Abdomen: nondistended, appropriate tenderness, incision is c/d/i  Uterus firm at umbilicus  Extremities: nontender, SCDs on      Results from last 7 days  Lab Units 10/22/18  0611   WBC Thousand/uL 12 40*   HEMOGLOBIN g/dL 13 0   MCV fL 87   PLATELETS Thousands/uL 240       Results from last 7 days  Lab Units 10/22/18  0611   NEUTROS PCT % 68   MONOS PCT % 6   EOS PCT % 2               Invalid input(s): CA        Invalid input(s): DBILI, ALP        MEDS:   Current Facility-Administered Medications   Medication Dose Route Frequency    acetaminophen (TYLENOL) tablet 650 mg  650 mg Oral Q6H PRN    benzocaine-menthol-lanolin-aloe (DERMOPLAST) 20-0 5 % topical spray 1 application  1 application Topical X1N PRN    calcium carbonate (TUMS) chewable tablet 1,000 mg  1,000 mg Oral TID PRN    dexamethasone (PF) (DECADRON) injection 8 mg  8 mg Intravenous Once PRN    diphenhydrAMINE (BENADRYL) injection 25 mg  25 mg Intravenous Q6H PRN    diphenhydrAMINE (BENADRYL) tablet 25 mg  25 mg Oral Q6H PRN    docusate sodium (COLACE) capsule 100 mg  100 mg Oral BID    fentaNYL (SUBLIMAZE) injection 25 mcg  25 mcg Intravenous Q3 min PRN    hydrocortisone 1 % cream 1 application  1 application Topical 4x Daily PRN    [START ON 10/23/2018] HYDROmorphone (DILAUDID) injection 1 mg  1 mg Intravenous Q2H PRN    ibuprofen (MOTRIN) tablet 600 mg  600 mg Oral Q6H PRN    ketorolac (TORADOL) injection 15 mg  15 mg Intravenous Q6H    ketorolac (TORADOL) injection 30 mg  30 mg Intravenous Once    lactated ringers infusion  125 mL/hr Intravenous Continuous    lactated ringers infusion  125 mL/hr Intravenous Continuous    metoclopramide (REGLAN) injection 5 mg  5 mg Intravenous Q6H PRN    morphine (PF) (DURAMORPH) 0 5 mg/mL injection **ADS Override Pull**        nalbuphine (NUBAIN) injection 2 mg  2 mg Intravenous Q3H PRN    naloxone (NARCAN) injection 0 1 mg  0 1 mg Intravenous Q3 min PRN    ondansetron (ZOFRAN) injection 4 mg  4 mg Intravenous Q8H PRN    ondansetron (ZOFRAN) injection 4 mg  4 mg Intravenous Once PRN    ondansetron (ZOFRAN) injection 4 mg  4 mg Intravenous Q4H PRN    ondansetron (ZOFRAN) injection 4 mg  4 mg Intravenous Q8H PRN    oxyCODONE-acetaminophen (PERCOCET) 5-325 mg per tablet 1 tablet  1 tablet Oral Q6H PRN    [START ON 10/23/2018] oxyCODONE-acetaminophen (PERCOCET) 5-325 mg per tablet 1 tablet  1 tablet Oral Q4H PRN    [START ON 10/23/2018] oxyCODONE-acetaminophen (PERCOCET) 5-325 mg per tablet 2 tablet  2 tablet Oral Q4H PRN    simethicone (MYLICON) chewable tablet 80 mg  80 mg Oral 4x Daily PRN    witch hazel-glycerin (TUCKS) topical pad 1 pad  1 pad Topical Q4H PRN     Invasive Devices     Peripheral Intravenous Line            Peripheral IV 10/22/18 Left Antecubital less than 1 day          Drain            Urethral Catheter Latex 16 Fr  less than 1 day              Medication Administration - last 24 hours from 10/21/2018 1742 to 10/22/2018 1742       Date/Time Order Dose Route Action Action by     10/22/2018 0909 lactated ringers infusion   Intravenous Anesthesia Volume Adjustment Castro Stovall, SONNY     10/22/2018 0856 lactated ringers infusion   Intravenous New Bag Castro Stovall, CRNA     10/22/2018 0809 lactated ringers infusion   Intravenous New Bag Castro Stovall, CRNA     10/22/2018 0550 lactated ringers infusion 999 mL/hr Intravenous [address] Norah Johnson RN     10/22/2018 0715 ondansetron (ZOFRAN) injection 4 mg 4 mg Intravenous Given Kristal Arora RN     10/22/2018 0730 sod citrate-citric acid (BICITRA) oral solution 30 mL 30 mL Oral Given Kristal Arora RN     10/22/2018 1516 ketorolac (TORADOL) injection 15 mg 15 mg Intravenous Given Mani Nunez RN     10/22/2018 1240 oxyCODONE-acetaminophen (PERCOCET) 5-325 mg per tablet 1 tablet 1 tablet Oral Given Troy Sin RN              Signature / Title:  Makayla Diez MD, MD, Resident - Ob/Gyn    Date: 10/22/2018  Time: 2:30 PM

## 2018-10-22 NOTE — LACTATION NOTE
This note was copied from a baby's chart  Met with mother  Provided mother with Ready, Set, Baby booklet  Discussed Skin to Skin contact an benefits to mom and baby  Talked about the delay of the first bath until baby has adjusted  Spoke about the benefits of rooming in  Feeding on cue and what that means for recognizing infant's hunger  Avoidance of pacifiers for the first month discussed  Talked about exclusive breastfeeding for the first 6 months  Positioning and latch reviewed as well as showing images of other feeding positions  Discussed the properties of a good latch in any position  Reviewed hand/manual expression  Discussed s/s that baby is getting enough milk and some s/s that breastfeeding dyad may need further help  Gave information on common concerns, what to expect the first few weeks after delivery, preparing for other caregivers, and how partners can help  Resources for support also provided  Assisted mom with first feeding  Demo  cross cradle hold, how to hand express and how to get a deep latch  Baby latched well  Enc to call for assistance as needed,phone # provided

## 2018-10-22 NOTE — PLAN OF CARE
Problem: PAIN - ADULT  Goal: Verbalizes/displays adequate comfort level or baseline comfort level  Interventions:  - Encourage patient to monitor pain and request assistance  - Assess pain using appropriate pain scale  - Administer analgesics based on type and severity of pain and evaluate response  - Implement non-pharmacological measures as appropriate and evaluate response  - Consider cultural and social influences on pain and pain management  - Notify physician/advanced practitioner if interventions unsuccessful or patient reports new pain  Outcome: Progressing      Problem: INFECTION - ADULT  Goal: Absence or prevention of progression during hospitalization  INTERVENTIONS:  - Assess and monitor for signs and symptoms of infection  - Monitor lab/diagnostic results  - Monitor all insertion sites, i e  indwelling lines, tubes, and drains  - Monitor endotracheal (as able) and nasal secretions for changes in amount and color  - [place] appropriate cooling/warming therapies per order  - Administer medications as ordered  - Instruct and encourage patient and family to use good hand hygiene technique  - Identify and instruct in appropriate isolation precautions for identified infection/condition  Outcome: Progressing    Goal: Absence of fever/infection during neutropenic period  INTERVENTIONS:  - Monitor WBC  - Implement neutropenic guidelines  Outcome: Progressing      Problem: SAFETY ADULT  Goal: Patient will remain free of falls  INTERVENTIONS:  - Assess patient frequently for physical needs  -  Identify cognitive and physical deficits and behaviors that affect risk of falls    -  [place] fall precautions as indicated by assessment   - Educate patient/family on patient safety including physical limitations  - Instruct patient to call for assistance with activity based on assessment  - Modify environment to reduce risk of injury  - Consider OT/PT consult to assist with strengthening/mobility  Outcome: Progressing    Goal: Maintain or return to baseline ADL function  INTERVENTIONS:  -  Assess patient's ability to carry out ADLs; assess patient's baseline for ADL function and identify physical deficits which impact ability to perform ADLs (bathing, care of mouth/teeth, toileting, grooming, dressing, etc )  - Assess/evaluate cause of self-care deficits   - Assess range of motion  - Assess patient's mobility; develop plan if impaired  - Assess patient's need for assistive devices and provide as appropriate  - Encourage maximum independence but intervene and supervise when necessary  ¯ Involve family in performance of ADLs  ¯ Assess for home care needs following discharge   ¯ Request OT consult to assist with ADL evaluation and planning for discharge  ¯ Provide patient education as appropriate  Outcome: Progressing    Goal: Maintain or return mobility status to optimal level  INTERVENTIONS:  - Assess patient's baseline mobility status (ambulation, transfers, stairs, etc )    - Identify cognitive and physical deficits and behaviors that affect mobility  - Identify mobility aids required to assist with transfers and/or ambulation (gait belt, sit-to-stand, lift, walker, cane, etc )  - [place] fall precautions as indicated by assessment  - Record patient progress and toleration of activity level on Mobility SBAR; progress patient to next Phase/Stage  - Instruct patient to call for assistance with activity based on assessment  - Request Rehabilitation consult to assist with strengthening/weightbearing, etc   Outcome: Progressing      Problem: Knowledge Deficit  Goal: Patient/family/caregiver demonstrates understanding of disease process, treatment plan, medications, and discharge instructions  Complete learning assessment and assess knowledge base    Interventions:  - Provide teaching at level of understanding  - Provide teaching via preferred learning methods  Outcome: Progressing      Problem: DISCHARGE PLANNING  Goal: Discharge to home or other facility with appropriate resources  INTERVENTIONS:  - Identify barriers to discharge w/patient and caregiver  - Arrange for needed discharge resources and transportation as appropriate  - Identify discharge learning needs (meds, wound care, etc )  - Arrange for interpretive services to assist at discharge as needed  - Refer to Case Management Department for coordinating discharge planning if the patient needs post-hospital services based on physician/advanced practitioner order or complex needs related to functional status, cognitive ability, or social support system  Outcome: Progressing

## 2018-10-22 NOTE — ANESTHESIA PROCEDURE NOTES
Spinal Block    Patient location during procedure: OB  Start time: 10/22/2018 7:42 AM  End time: 10/22/2018 7:47 AM  Reason for block: primary anesthetic  Staffing  Anesthesiologist: Cody Soulier  Resident/CRNA: SHITAL LACY  Performed: anesthesiologist   Preanesthetic Checklist  Completed: patient identified, site marked, surgical consent, pre-op evaluation, timeout performed, IV checked, risks and benefits discussed and monitors and equipment checked  Spinal Block  Patient position: sitting  Prep: Betadine  Patient monitoring: heart rate, continuous pulse ox and frequent blood pressure checks  Approach: midline  Location: L3-4  Injection technique: single-shot  Needle  Needle type: pencil-tip   Needle gauge: 24 G  Assessment  Sensory level: T4  Injection Assessment:  positive aspiration for clear CSF

## 2018-10-22 NOTE — H&P
H&P Exam - Obstetrics   Claude Cee 25 y o  female MRN: [id number]  Unit/Bed#: L&D 329-01 Encounter: [id number]      History of Present Illness     Chief Complaint: I am here for my repeat  section    HPI:  Claude Cee is a 25 y o  [id number] female with an MARTI of 10/29/2018, by Last Menstrual Period at 39w0d weeks gestation who is being admitted for a scheduled repeat  section  Contractions: no  Vaginal Bleeding:no  Loss of Fluid:no  Fetal Movement:yes    PREGNANCY COMPLICATIONS: history of SVT with prior attempt at ablation, prior  section, polyhydramnios, BMI: 33, bicornate uterus    OB History    Para Term  AB Living   4 2 2   1 2   SAB TAB Ectopic Multiple Live Births   1       2      # Outcome Date GA Lbr Victor Manuel/2nd Weight Sex Delivery Anes PTL Lv   4 Current            3 SAB 2017     SAB      2 Term 10/14/13 39w0d  2977 g (6 lb 9 oz) F CS-Unspec   NOE   1 Term 08/10/09 39w0d  3090 g (6 lb 13 oz) F CS-Unspec  N NOE          Baby complications/comments: vertex, EFW: 7 5 pounds    Review of Systems   Constitutional: Negative for chills and fever  Eyes: Negative for visual disturbance  Respiratory: Negative for shortness of breath  Cardiovascular: Negative for chest pain  Gastrointestinal: Negative for constipation, diarrhea, nausea and vomiting  Genitourinary: Negative for pelvic pain, urgency, vaginal bleeding, vaginal discharge and vaginal pain  Neurological: Negative for headaches         Historical Information   Past Medical History:   Diagnosis Date    Bicornate uterus     noted in previous ultrasound report    Liver disease     has spots on her liver    Liver spots     Migraine     history of    Miscarriage     sab x1    SVT (supraventricular tachycardia) ([address] )     last episode  with 45 minute syncope, loop monitor implanted 2016    Urinary tract infection     infrequent UTI    Varicella     positive disease     Past Surgical History:   Procedure Laterality Date    CARDIAC ELECTROPHYSIOLOGY MAPPING AND ABLATION  2016    CARDIAC LOOP RECORDER       SECTION      x2    CHOLECYSTECTOMY  2017    Lap Eleanor    CYST REMOVAL      TONSILECTOMY AND ADNOIDECTOMY       Social History   History   Alcohol Use No     History   Drug Use No     History   Smoking Status    Never Smoker   Smokeless Tobacco    Never Used     Family History: non-contributory    Meds/Allergies    {  Prescriptions Prior to Admission   Medication    diphenhydrAMINE (BENADRYL) 25 mg capsule    Doxylamine Succinate, Sleep, (UNISOM PO)    loratadine (CLARITIN) 10 mg tablet    Prenatal Multivit-Min-Fe-FA (PRENATAL VITAMINS PO)    Pyridoxine HCl (VITAMIN B6 PO)    scopolamine (TRANSDERM-SCOP) 1 5 mg/3 days TD 72 hr patch        Allergies   Allergen Reactions    Pollen Extract Allergic Rhinitis       OBJECTIVE:    Vitals:   /69 (BP Location: Right arm)   Pulse 100   Temp 97 9 °F (36 6 °C) (Oral)   Resp 16   Ht 5' 8" (1 727 m)   Wt 101 kg (222 lb)   LMP 2018 (Approximate)   BMI 33 75 kg/m²   Body mass index is 33 75 kg/m²  Physical Exam   Constitutional: She is oriented to person, place, and time  She appears well-developed and well-nourished  Cardiovascular: Normal rate and regular rhythm  Pulmonary/Chest: Effort normal and breath sounds normal    Abdominal: Bowel sounds are normal  There is no tenderness  There is no rebound and no guarding  Musculoskeletal: Normal range of motion  She exhibits no tenderness  Neurological: She is alert and oriented to person, place, and time  Psychiatric: She has a normal mood and affect  Nursing note and vitals reviewed      SVE:  deferred    FHT: 140bpm with moderate variability, positive accels, negative decels     TOCO: irritability         Prenatal Labs:   Blood type: O positive  Antigen Screen: negative  Rubella: Immune  HIV: Negative  RPR: NR  Hep B: negative  Diabetes Screen: 95  GBS: negative      Invasive Devices     Peripheral Intravenous Line            Peripheral IV 10/22/18 Left Antecubital less than 1 day                Assessment/Plan     ASSESSMENT:   IUP at 39w0d weeks gestation admitted for scheduled repeat  section  PLAN:   1) Admit   2) CBC, RPR, Blood Type, IV 2g ancef   3) Anesthesia and NICU aware   4) Plan for repeat  section   5) D/W Dr Teresa Lynn        This patient will be an INPATIENT  and I certify the anticipated length of stay is >2 Midnights        Rina Minor MD  10/22/2018  6:52 AM

## 2018-10-22 NOTE — DISCHARGE SUMMARY
Discharge Summary - Emile Lobo 25 y o  female MRN: [id number]    Unit/Bed#: L&D 950-19 Encounter: [id number]    Admission Date: 10/22/2018     Discharge Date: 10/25/2018    Admitting Diagnosis:   1  Pregnancy at 39w0d  2  Polyhydramnios  3  Prior  section x 2  4  Single fetus    Discharge Diagnosis: same, delivered    Procedures: repeat  section, low transverse incision    Admission / Delivery Attending: Arlyn Perez MD     Discharge Attending: Dr Bev Castro Course:     Emile Lobo is a 25 y o  [id number] at 39w0d wks who was initially admitted for a scheduled repeat  section  She delivered a viable female  on 10/22/18 at [id number]  Weight 6lbs 13oz via repeat  section, low transverse incision  Apgars were 8 (1 min) and 9 (5 min)   was transferred to  nursery  Patient tolerated the procedure well and was transferred to recovery in stable condition  Her post-operative course was unremarkable  Preoperative hemaglobin was 13 0, postoperative was 8 6  Her postoperative pain was well controlled with oral analgesics  On day of discharge, she was ambulating and able to reasonably perform all ADLs  She was voiding and had appropriate bowel function  Pain was well controlled  She was discharged home on post-operative day #3 without complications  Patient was instructed to follow up with her OB as an outpatient and was given appropriate warnings to call provider if she develops signs of infection or uncontrolled pain  Complications: none apparent    Condition at discharge: stable     Discharge Medications:   Prenatal vitamin daily for 6 months or the duration of nursing whichever is longer    Motrin 600 mg orally every 6 hours as needed for pain  Tylenol (over the counter) per bottle directions as needed for pain  Percocet 5-325mg Q6 PRN for moderate to severe pain    Discharge instructions :   -Do not place anything (no partner, tampons or douche) in your vagina for 6 weeks  -You may walk for exercise for the first 6 weeks then gradually return to your usual activities    -Please do not drive for 1 week if you have no stitches and for 2 weeks if you have stitches or underwent a  delivery     -You may take baths or shower per your preference    -Please look at your bust (breasts) in the mirror daily and call for redness or tenderness or increased warmth  - If you have had a  please look at your incision daily as well and call us for increasing redness or steady drainage from the incision    -Please call us for temperature > 100 4*F or 38* C, worsening pain or a foul discharge  Discharge instructions/Information to patient and family: See after visit summary for information provided to patient and family  Provisions for Follow-Up Care:  See after visit summary for information related to follow-up care and any pertinent home health orders        Disposition: Home    Planned Readmission: No

## 2018-10-23 LAB
BASOPHILS # BLD AUTO: 0.04 THOUSANDS/ΜL (ref 0–0.1)
BASOPHILS NFR BLD AUTO: 0 % (ref 0–1)
EOSINOPHIL # BLD AUTO: 0.25 THOUSAND/ΜL (ref 0–0.61)
EOSINOPHIL NFR BLD AUTO: 2 % (ref 0–6)
ERYTHROCYTE [DISTWIDTH] IN BLOOD BY AUTOMATED COUNT: 14.5 % (ref 11.6–15.1)
HCT VFR BLD AUTO: 26.3 % (ref 34.8–46.1)
HGB BLD-MCNC: 8.6 G/DL (ref 11.5–15.4)
IMM GRANULOCYTES # BLD AUTO: 0.1 THOUSAND/UL (ref 0–0.2)
IMM GRANULOCYTES NFR BLD AUTO: 1 % (ref 0–2)
LYMPHOCYTES # BLD AUTO: 2.68 THOUSANDS/ΜL (ref 0.6–4.47)
LYMPHOCYTES NFR BLD AUTO: 22 % (ref 14–44)
MCH RBC QN AUTO: 29.9 PG (ref 26.8–34.3)
MCHC RBC AUTO-ENTMCNC: 33.1 G/DL (ref 31.4–37.4)
MCV RBC AUTO: 90 FL (ref 82–98)
MONOCYTES # BLD AUTO: 0.8 THOUSAND/ΜL (ref 0.17–1.22)
MONOCYTES NFR BLD AUTO: 7 % (ref 4–12)
NEUTROPHILS # BLD AUTO: 8.32 THOUSANDS/ΜL (ref 1.85–7.62)
NEUTS SEG NFR BLD AUTO: 68 % (ref 43–75)
NRBC BLD AUTO-RTO: 0 /100 WBCS
PLATELET # BLD AUTO: 178 THOUSANDS/UL (ref 149–390)
PMV BLD AUTO: 9.8 FL (ref 8.9–12.7)
RBC # BLD AUTO: 2.91 MILLION/UL (ref 3.81–5.12)
RPR SER QL: NORMAL
WBC # BLD AUTO: 12.19 THOUSAND/UL (ref 4.31–10.16)

## 2018-10-23 PROCEDURE — 85025 COMPLETE CBC W/AUTO DIFF WBC: CPT | Performed by: STUDENT IN AN ORGANIZED HEALTH CARE EDUCATION/TRAINING PROGRAM

## 2018-10-23 RX ADMIN — DOCUSATE SODIUM 100 MG: 100 CAPSULE, LIQUID FILLED ORAL at 08:32

## 2018-10-23 RX ADMIN — OXYCODONE HYDROCHLORIDE AND ACETAMINOPHEN 2 TABLET: 5; 325 TABLET ORAL at 15:23

## 2018-10-23 RX ADMIN — OXYCODONE HYDROCHLORIDE AND ACETAMINOPHEN 2 TABLET: 5; 325 TABLET ORAL at 21:05

## 2018-10-23 RX ADMIN — OXYCODONE HYDROCHLORIDE AND ACETAMINOPHEN 1 TABLET: 5; 325 TABLET ORAL at 06:29

## 2018-10-23 RX ADMIN — ACETAMINOPHEN 650 MG: 325 TABLET, FILM COATED ORAL at 08:54

## 2018-10-23 RX ADMIN — IBUPROFEN 600 MG: 600 TABLET, FILM COATED ORAL at 13:36

## 2018-10-23 RX ADMIN — IBUPROFEN 600 MG: 600 TABLET, FILM COATED ORAL at 23:56

## 2018-10-23 RX ADMIN — OXYCODONE HYDROCHLORIDE AND ACETAMINOPHEN 2 TABLET: 5; 325 TABLET ORAL at 10:58

## 2018-10-23 RX ADMIN — DOCUSATE SODIUM 100 MG: 100 CAPSULE, LIQUID FILLED ORAL at 18:23

## 2018-10-23 RX ADMIN — KETOROLAC TROMETHAMINE 15 MG: 30 INJECTION, SOLUTION INTRAMUSCULAR at 03:08

## 2018-10-23 NOTE — LACTATION NOTE
This note was copied from a baby's chart  Mother verbalized breastfeeding is going well  C/O sore nipples  Information given about sore nipples and how to correct with positioning techniques  Discussed maneuvers to latch infant on properly to avoid nipple pain and promote healing  Discussed treatments that could be utilized to promote healing  Enc to call for assistance next feeding and as needed,phone # given

## 2018-10-23 NOTE — NURSING NOTE
TC-advised dr Kimberly Amaya that pt has C/O headache since 11am today  Pt states that H/A is worse upon standing  Advised MD that pt does have hx of SVT and recent B/P's have been on lower side and pt states that it isnt normal for her  Will medicate pt with 2 percocets and will reassess  No new orders given

## 2018-10-23 NOTE — LACTATION NOTE
This note was copied from a baby's chart  Assisted mom with breastfeeding  C/O soreness  I demo  football hold and how to get a deep latch and baby latched well  Gave mom lanolin cream with instructions on use   Enc to call for assistance as needed, phon e# given

## 2018-10-23 NOTE — PROGRESS NOTES
Progress Note - OB/GYN   Neha Clarke 25 y o  female MRN: [id number]  Unit/Bed#: L&D 309-01 Encounter: [id number]    Assessment:  Postop Day #1 s/p RLTCS, stable, baby in room     Plan:  1  Post-op Hemoglobin: 8 6 (EBL: 800cc)  2  Continue routine post partum care   Encourage ambulation   Encourage breastfeeding   Urine output ~90cc/hour    Subjective:  Patient is doing well  She is complaining of right sided pain  She is using ice packs which provide some relief  She would like to go home tomorrow if possible       Pain: yes, cramping, improved with meds  Tolerating PO: yes  Voiding: yes  Flatus: yes  BM: no   Ambulating: yes  Breastfeeding:  Yes   Chest pain: no  Shortness of breath: no  Leg pain: no  Lochia: minimal    Objective:     Vitals: BP (!) 80/51 (BP Location: Right arm)   Pulse 68   Temp 97 8 °F (36 6 °C) (Oral)   Resp 16   Ht 5' 8" (1 727 m)   Wt 101 kg (222 lb)   LMP 01/22/2018 (Approximate)   SpO2 96%   BMI 33 75 kg/m²       Intake/Output Summary (Last 24 hours) at 10/23/18 0649  Last data filed at 10/23/18 0445   Gross per 24 hour   Intake             2300 ml   Output             2825 ml   Net             -525 ml       Lab Results   Component Value Date    WBC 12 19 (H) 10/23/2018    HGB 8 6 (L) 10/23/2018    HCT 26 3 (L) 10/23/2018    MCV 90 10/23/2018     10/23/2018       Physical Exam:     Gen: AAOx3, NAD  CV: RRR  Lungs: CTA b/l  Abd: Soft, non-tender, non-distended, no rebound or guarding, incision is clean, dry and intact   Uterine fundus firm and non-tender  Ext: Non tender, significant swelling     Lalito Jerome MD  10/23/2018  6:49 AM

## 2018-10-24 RX ADMIN — OXYCODONE HYDROCHLORIDE AND ACETAMINOPHEN 2 TABLET: 5; 325 TABLET ORAL at 03:02

## 2018-10-24 RX ADMIN — IBUPROFEN 600 MG: 600 TABLET, FILM COATED ORAL at 17:15

## 2018-10-24 RX ADMIN — IBUPROFEN 600 MG: 600 TABLET, FILM COATED ORAL at 23:51

## 2018-10-24 RX ADMIN — OXYCODONE HYDROCHLORIDE AND ACETAMINOPHEN 2 TABLET: 5; 325 TABLET ORAL at 18:50

## 2018-10-24 RX ADMIN — OXYCODONE HYDROCHLORIDE AND ACETAMINOPHEN 2 TABLET: 5; 325 TABLET ORAL at 23:02

## 2018-10-24 RX ADMIN — IBUPROFEN 600 MG: 600 TABLET, FILM COATED ORAL at 10:57

## 2018-10-24 RX ADMIN — DOCUSATE SODIUM 100 MG: 100 CAPSULE, LIQUID FILLED ORAL at 08:22

## 2018-10-24 RX ADMIN — OXYCODONE HYDROCHLORIDE AND ACETAMINOPHEN 2 TABLET: 5; 325 TABLET ORAL at 08:22

## 2018-10-24 RX ADMIN — DOCUSATE SODIUM 100 MG: 100 CAPSULE, LIQUID FILLED ORAL at 17:15

## 2018-10-24 NOTE — LACTATION NOTE
This note was copied from a baby's chart  Mom having problems getting the baby to latch  Mom C/O breast engorgement  Mom wanted to pump to relieve some engorgement  Stressed to pump only if necessary for about 5-10 minutes to soften breast  Then used reverse pressure softening and placed baby deeply on right breast using [place] laid back hold  Baby feed well for 15 minutes then asleep and relaxed  Refused left breast at this time  Mom instructed to feed on left next feed  Mom given Handouts from Lactation Education Resources on Breast Engorgement, Plugged ducts and Mastitis, Milk oversupply if not corrected by 2 weeks and survival Guide for First 2 weeks Breastfeeding  Dad supportive at bedside  [employer] MOB and FOB to call for assistance, questions and concerns  Extension number for inpatient lactation support provided

## 2018-10-24 NOTE — LACTATION NOTE
This note was copied from a baby's chart  Information on hand expression given  Discussed benefits of knowing how to manually express breast including stimulating milk supply, softening nipple for latch and evacuating breast in the event of engorgement  Spent time working on different positions that would facilitate better transfer of breastmilk  Deep latch and strong suck on left then right breasts using football positioning  Dad present and supportive at bedside  Encoraged MOB and FOB to call for assistance, questions and concerns  Extension number for inpatient lactation support provided

## 2018-10-24 NOTE — PROGRESS NOTES
Progress Note - OB/GYN   Katya Garvin 25 y o  female MRN: [id number]  Unit/Bed#: L&D 309-01 Encounter: [id number]    Assessment:  Post partum Day #2 s/pRLTCS, stable, baby in room with mom and dad    Plan:  1  Post partum   - Continue routine post partum care  - Encourage ambulation  - Encourage breastfeeding  - Hgb: 13 0 --> 8 6  Hypotension without tachycardia    - Headache: not relieved by Tylenol  Continue conservative management  May discuss headache with anesthesia if headache does not resolve    2  Discharge planning  - Anticipate discharge tomorrow        Subjective/Objective   Chief Complaint:     Post delivery  Patient is doing well  Lochia WNL  Pain well controlled  Patient reports that she has had a headache since last night  It has not been relieved by Tylenol  She reports it is worse when she stands and walks around and feels like a pressure headache  She reports that the pain improves when she lies down  Subjective:     Pain: yes, controlled well with medication  Tolerating PO: yes  Voiding: yes  Flatus: yes  BM: no  Ambulating: yes  Breastfeeding:  yes  Chest pain: no  Shortness of breath: no  Leg pain: no  Lochia: minimal    Objective:     Vitals: BP (!) 86/50 (BP Location: Right arm)   Pulse 75   Temp 98 4 °F (36 9 °C) (Oral)   Resp 16   Ht 5' 8" (1 727 m)   Wt 101 kg (222 lb)   LMP 01/22/2018 (Approximate)   SpO2 97%   Breastfeeding? Yes   BMI 33 75 kg/m²       Intake/Output Summary (Last 24 hours) at 10/24/18 0625  Last data filed at 10/23/18 1113   Gross per 24 hour   Intake                0 ml   Output              425 ml   Net             -425 ml       Lab Results   Component Value Date    WBC 12 19 (H) 10/23/2018    HGB 8 6 (L) 10/23/2018    HCT 26 3 (L) 10/23/2018    MCV 90 10/23/2018     10/23/2018       Physical Exam:     General: Awake, Alert & Oriented x3  Head: Normocephalic  Atraumatic  CV: Regular rhythm and rate with normal S1/S2   No murmur, rub, or gallop  Respiratory: Equal breath sounds with symmetric chest rise  Clear to auscultation bilaterally  No wheezes, rhonchi, rales, or crackles  Abdomen: Soft, non-distended  Mild tenderness to palpation  No rigidity  MSK: No deformity  No LE edema or tenderness  : Uterine fundus firm and non-tender, at the umbilicus  Lochia minimal  Incision clean dry and intact without evidence of infection, erythema, or acute bleeding  Steri strips in place with old serosanguinous drainage noted         Leona Sofia MD  10/24/2018  6:25 AM

## 2018-10-25 VITALS
HEART RATE: 78 BPM | RESPIRATION RATE: 18 BRPM | BODY MASS INDEX: 33.65 KG/M2 | TEMPERATURE: 98.3 F | WEIGHT: 222 LBS | OXYGEN SATURATION: 98 % | DIASTOLIC BLOOD PRESSURE: 64 MMHG | HEIGHT: 68 IN | SYSTOLIC BLOOD PRESSURE: 102 MMHG

## 2018-10-25 RX ORDER — OXYCODONE HYDROCHLORIDE AND ACETAMINOPHEN 5; 325 MG/1; MG/1
1 TABLET ORAL EVERY 6 HOURS PRN
Qty: 12 TABLET | Refills: 0 | Status: SHIPPED | OUTPATIENT
Start: 2018-10-25 | End: 2018-11-04

## 2018-10-25 RX ORDER — OXYCODONE HYDROCHLORIDE AND ACETAMINOPHEN 5; 325 MG/1; MG/1
1 TABLET ORAL EVERY 6 HOURS PRN
Refills: 0
Start: 2018-10-25 | End: 2018-10-25

## 2018-10-25 RX ORDER — IBUPROFEN 200 MG
600 TABLET ORAL EVERY 6 HOURS PRN
Start: 2018-10-25 | End: 2019-03-20 | Stop reason: ALTCHOICE

## 2018-10-25 RX ORDER — DOCUSATE SODIUM 100 MG/1
100 CAPSULE, LIQUID FILLED ORAL 2 TIMES DAILY
Qty: 10 CAPSULE | Refills: 0
Start: 2018-10-25 | End: 2019-03-20 | Stop reason: ALTCHOICE

## 2018-10-25 RX ADMIN — DOCUSATE SODIUM 100 MG: 100 CAPSULE, LIQUID FILLED ORAL at 09:45

## 2018-10-25 RX ADMIN — OXYCODONE HYDROCHLORIDE AND ACETAMINOPHEN 2 TABLET: 5; 325 TABLET ORAL at 09:45

## 2018-10-25 RX ADMIN — IBUPROFEN 600 MG: 600 TABLET, FILM COATED ORAL at 11:36

## 2018-10-25 RX ADMIN — IBUPROFEN 600 MG: 600 TABLET, FILM COATED ORAL at 06:16

## 2018-10-25 NOTE — LACTATION NOTE
This note was copied from a baby's chart  Met with mother to go over breastfeeding discharge booklet including the feeding log since birth for the first week  Emphasized 8 or more (12) feedings in a 24 hour period, what to expect for the number of diapers per day of life and the progression of properties of the  stooling pattern  Discussed s/s that breastfeeding is going well after day 4 and when to get help from a pediatrician or lactation support person after day 4  Booklet included Breast Pumping Instructions, When You Go Back to Work or School, and Breastfeeding Resources for after discharge including access to the number for the [employer]

## 2018-10-25 NOTE — PLAN OF CARE
DISCHARGE PLANNING     Discharge to home or other facility with appropriate resources Adequate for Discharge        INFECTION - ADULT     Absence or prevention of progression during hospitalization Adequate for Discharge     Absence of fever/infection during neutropenic period Adequate for Discharge        Knowledge Deficit     Patient/family/caregiver demonstrates understanding of disease process, treatment plan, medications, and discharge instructions Adequate for Discharge        PAIN - ADULT     Verbalizes/displays adequate comfort level or baseline comfort level Adequate for Discharge        POSTPARTUM     Experiences normal postpartum course Adequate for Discharge     Appropriate maternal -  bonding Adequate for Discharge     Establishment of infant feeding pattern Adequate for Discharge     Incision(s), wounds(s) or drain site(s) healing without S/S of infection Adequate for Discharge        Potential for Falls     Patient will remain free of falls Adequate for Discharge        SAFETY ADULT     Patient will remain free of falls Adequate for Discharge     Maintain or return to baseline ADL function Adequate for Discharge     Maintain or return mobility status to optimal level Adequate for Discharge

## 2018-10-25 NOTE — UTILIZATION REVIEW
Notification of Maternity Inpatient Admission/Maternity Inpatient Authorization Request  This is a Notification of Maternity Inpatient Admission/Maternity Inpatient Authorization Request to our facility [address] [place] [address]  Please be advised that this patient is currently in our facility under Inpatient Status  Below you will find the Birth/[facility] Summary, Attending Physician and Facilitys information including NPI#  and contact information for the Utilization Review Department where the patient is receiving care services  Facility: [address] [place] [address]  Address: Cristofer Philippe\[facility]\"", [address]   Phone: [phone] Tax ID: [id number]  NPI: [id number]  Medicare ID: [id number]    Place of Service Code: 24   Place of Service Name: Inpatient Hospital  Presentation Date & Time: 10/22/2018  5:42 AM  Inpatient Admission Date & Time: 10/22/18 5716  Discharge Date & Time: 10/25/2018  1:00 PM   Discharge Disposition (if discharged): Home/Self Care  Attending Physician & NPI: Amy Avendano Md [[id number]]  [facility]  [facility]- [facility]stetrics and Gynecology  [address] [id number]  [address] [place] [address], [address]   Phone 1: [phone]  Fax: [phone]  Mother of Greenway Information: Todd Hanna   MRN: [id number] YOB: 1990   Estimated Date of Delivery: 10/29/18  Type of Delivery: , Low Transverse    Delivering clinician: Lauren Izquierdo   OB History      Para Term  AB Living    4 3 3   1 3    SAB TAB Ectopic Multiple Live Births    1     0 3        Greenway Name & MRN:   Information for the patient's :  Tamela Eaton Girl  Treva Delgado) [[id number]]      Delivery Information:  Sex: female  Delivered 10/22/2018 8:23 AM by , Low Transverse; Gestational Age: 36w0d     Measurements:  Weight: 6 lb 13 oz (3090 g);   Height: 19 5"    APGAR 1 minute 5 minutes 10 minutes   Totals: 8 9      Thank you,  [facility]  Utilization Review Department  Phone: Perry Mccauley  - [phone]; Fax [phone]  ATTENTION: Please call with any questions or concerns to [phone]  and carefully follow the prompts so that you are directed to the right person  Send all requests for admission clinical reviews, approved or denied determinations and any other requests to fax [phone]   All voicemails are confidential

## 2018-10-25 NOTE — PROGRESS NOTES
Progress Note - OB/GYN   Remi Golden 25 y o  female MRN: [id number]  Unit/Bed#: L&D 309-01 Encounter: [id number]    Assessment:  Post partum Day #3 s/pRLTCS, stable, baby in room with mom and dad    Plan:  1  Post partum   - Continue routine post partum care  - Encourage ambulation  - Encourage breastfeeding  Recommended for lactation to come by one more time before discharge today  - Headache: Continue conservative management  May discuss headache with anesthesia if headache does not resolve within 1 week    2  Discharge planning  - Anticipate discharge today  Subjective/Objective   Chief Complaint:     Post delivery  Patient is doing well  Lochia WNL  Pain well controlled  Patient reports that her headache has improved  She reports that it only happens occasionally when she stands  She reports that she has had a blood patch before for a similar headache but does not want that again at this time  Subjective:     Pain: yes, controlled well with medication  Tolerating PO: yes  Voiding: yes  Flatus: yes  BM: no  Ambulating: yes  Breastfeeding:  yes  Chest pain: no  Shortness of breath: no  Leg pain: no  Lochia: minimal    Objective:     Vitals: /61 (BP Location: Right arm)   Pulse 77   Temp 98 4 °F (36 9 °C) (Oral)   Resp 16   Ht 5' 8" (1 727 m)   Wt 101 kg (222 lb)   LMP 01/22/2018 (Approximate)   SpO2 98%   Breastfeeding? No   BMI 33 75 kg/m²     No intake or output data in the 24 hours ending 10/25/18 0655    Lab Results   Component Value Date    WBC 12 19 (H) 10/23/2018    HGB 8 6 (L) 10/23/2018    HCT 26 3 (L) 10/23/2018    MCV 90 10/23/2018     10/23/2018       Physical Exam:     General: Awake, Alert & Oriented x3  Head: Normocephalic  Atraumatic  CV: Regular rhythm and rate with normal S1/S2  No murmur, rub, or gallop  Respiratory: Equal breath sounds with symmetric chest rise  Clear to auscultation bilaterally  No wheezes, rhonchi, rales, or crackles     Abdomen: Soft, non-distended  Mild tenderness to palpation  No rigidity  MSK: No deformity  No LE edema or tenderness  : Uterine fundus firm and non-tender, 2cm below the umbilicus  Lochia minimal  Incision clean dry and intact without evidence of infection, erythema, or acute bleeding  Steri strips in place with old serosanguinous drainage noted         Arlyn Ren MD  10/25/2018  6:55 AM

## 2018-10-25 NOTE — PROGRESS NOTES
Called to mom room  States breast are engorged, having trouble latching baby onto breast  Reviewed  Hand expressions  And pumping to soften breast to assist with latch  Mom states nipples are cracked and bleeding  She does not want to put baby to breast" it hurts to bad"  Lanolin cream and shells given  Instructed mom on use of both  Explained to mom again importance of putting  baby to breast  States she just wants to pump for a couple of days and feed baby breast milk, then attempt to breast feed again after nipples feel better  Explained to mom  About nipple confusion and that  baby may have difficulty going back to breast   Verbalized understanding  Continues to want to pump  Explained different methods of feeding baby breast milk  Mom chooses to finger feed  Explained finger feeding, mom demonstrated correctly  States she had to finger feed her last child

## 2018-10-25 NOTE — LACTATION NOTE
This note was copied from a baby's chart  Mom called for advice  She started pumping and finger feeding yesterday at 1630, because she has sore nipples and engorgement  She feels she is having difficulty latching as well  She does not want help latching at this point  I reviewed treatment for engorgement and enc  pumping at least 8 times per day for 15-20 minutes, never more than 30 minutes  I showed her the video on hands on pumping  I discussed possibly using a nipple shield to protect nipple, so we can keep latching  Mom is unsure if she wants to do this  She hopes to be able to get back to latching after her nipples feel better  Parents are finger feeding baby until then  I enc her to follow up with lactation help after discharge

## 2018-10-30 ENCOUNTER — OFFICE VISIT (OUTPATIENT)
Dept: OBGYN CLINIC | Facility: MEDICAL CENTER | Age: 28
End: 2018-10-30
Payer: COMMERCIAL

## 2018-10-30 VITALS — SYSTOLIC BLOOD PRESSURE: 100 MMHG | DIASTOLIC BLOOD PRESSURE: 70 MMHG | BODY MASS INDEX: 31.47 KG/M2 | WEIGHT: 207 LBS

## 2018-10-30 DIAGNOSIS — Z98.890 POST-OPERATIVE STATE: Primary | ICD-10-CM

## 2018-10-30 LAB — PLACENTA IN STORAGE: NORMAL

## 2018-10-30 PROCEDURE — 99024 POSTOP FOLLOW-UP VISIT: CPT | Performed by: NURSE PRACTITIONER

## 2018-10-30 NOTE — PROGRESS NOTES
Assessment Bear Ventura was seen today for post-op  Diagnoses and all orders for this visit:    Post-operative state         Plan  Patient is doing well  No c/o  Not taking any pain meds  Already out shopping  Pt to f/u in 5 weeks  bc options d/w her and spouse  She is interested in nuvaring or nexplanon  Will rto for 6wk pp exam with Dr Alejandra Hatfield, and to start bc method  To call sooner with any questions/concerns  Mitali Rao is a 25 y o  female here for a postop visit  She is here for incision check after c/s on 10/22 with Dr eBnjy Harvey  States she is pumping and getting a lot of milk  Baby Arcadio Ulloa doing well       Patient Active Problem List   Diagnosis    Bicornate uterus    SVT (supraventricular tachycardia) ([address] )    Third trimester pregnancy    H/O  section complicating pregnancy    39 weeks gestation of pregnancy    Palpitations    Polyhydramnios in stein pregnancy in third trimester    Significant discrepancy between uterine size and clinical dates, antepartum, third trimester    Low back pain radiating to right lower extremity    Pubic bone pain    Sinus tachycardia    Maternal congenital cardiac anomaly complicating pregnancy in third trimester    S/P  section    Polyhydramnios       Gynecologic History  Patient's last menstrual period was 2018 (approximate)  The current method of family planning is abstinence      Past Medical History:   Diagnosis Date    Bicornate uterus     noted in previous ultrasound report    Liver disease     has spots on her liver    Liver spots     Migraine     history of    Miscarriage     sab x1    SVT (supraventricular tachycardia) ([address] )     last episode  with 45 minute syncope, loop monitor implanted 2016    Urinary tract infection     infrequent UTI    Varicella     positive disease     Past Surgical History:   Procedure Laterality Date    CARDIAC ELECTROPHYSIOLOGY MAPPING AND ABLATION 2016    CARDIAC LOOP RECORDER       SECTION      x2    CHOLECYSTECTOMY  2017    Lap Eleanor    CYST REMOVAL      OK  DELIVERY ONLY Bilateral 10/22/2018    Procedure:  SECTION () REPEAT;  Surgeon: Dulce Valenzuela MD;  [facility];  Service: Obstetrics    TONSILECTOMY AND ADNOIDECTOMY       Family History   Problem Relation Age of Onset    Arthritis Mother     Deafness Mother     Infertility Mother    [place] / [place] Mother     Breast cancer Maternal Grandmother         under 39years old    Diabetes Other     No Known Problems Father     Lymphoma Paternal Grandfather     Valvular heart disease Paternal Grandfather     Seizures Sister      Social History     Social History    Marital status: /Civil Union     Spouse name: N/A    Number of children: 2    Years of education: N/A     Occupational History    Not on file       Social History Main Topics    Smoking status: Never Smoker    Smokeless tobacco: Never Used    Alcohol use No    Drug use: No    Sexual activity: Yes     Partners: Male     Birth control/ protection: None     Other Topics Concern    Not on file     Social History Narrative    Exercises 3 times weekly    Pets: Dog     Allergies   Allergen Reactions    Pollen Extract Allergic Rhinitis       Current Outpatient Prescriptions:     Prenatal Multivit-Min-Fe-FA (PRENATAL VITAMINS PO), Take by mouth, Disp: , Rfl:     diphenhydrAMINE (BENADRYL) 25 mg capsule, Take 25 mg by mouth daily at bedtime as needed for itching or allergies, Disp: , Rfl:     docusate sodium (COLACE) 100 mg capsule, Take 1 capsule (100 mg total) by mouth 2 (two) times a day (Patient not taking: Reported on 10/30/2018 ), Disp: 10 capsule, Rfl: 0    ibuprofen (MOTRIN) 200 mg tablet, Take 3 tablets (600 mg total) by mouth every 6 (six) hours as needed for mild pain (Patient not taking: Reported on 10/30/2018 ), Disp: , Rfl:     loratadine (CLARITIN) 10 mg tablet, Take 10 mg by mouth daily, Disp: , Rfl:     oxyCODONE-acetaminophen (PERCOCET) 5-325 mg per tablet, Take 1 tablet by mouth every 6 (six) hours as needed for severe pain for up to 10 days Max Daily Amount: 4 tablets (Patient not taking: Reported on 10/30/2018 ), Disp: 12 tablet, Rfl: 0    Review of Systems  Constitutional :no fever, feels well, no tiredness, no recent weight gain or loss  ENT: no ear ache, no loss of hearing, no nosebleeds or nasal discharge, no sore throat or hoarseness  Cardiovascular: no complaints of slow or fast heart beat, no chest pain, no palpitations, no leg claudication or lower extremity edema  Respiratory: no complaints of shortness of shortness of breath, no GARCIAS  Breasts:no complaints of breast pain, breast lump, or nipple discharge  Gastrointestinal: no complaints of abdominal pain, constipation,nausea, vomiting, or diarrhea or bloody stools  Genitourinary : no complaints of dysuria, incontinence, pelvic pain, no dysmenorrhea, vaginal discharge or abnormal vaginal bleeding and as noted in HPI    Integumentary: no complaints of skin rash or lesion, itching or dry skin  Neurological: no complaints of headache, no confusion, no numbness or tingling, no dizziness or fainting     Objective     /70   Wt 93 9 kg (207 lb)   LMP 01/22/2018 (Approximate)   BMI 31 47 kg/m²     General:   appears stated age, cooperative, alert normal mood and affect   Abdomen: soft, non-tender, without masses or organomegaly, incision c/d/i

## 2018-11-06 ENCOUNTER — OFFICE VISIT (OUTPATIENT)
Dept: OBGYN CLINIC | Facility: MEDICAL CENTER | Age: 28
End: 2018-11-06

## 2018-11-06 VITALS — WEIGHT: 200 LBS | BODY MASS INDEX: 30.41 KG/M2 | DIASTOLIC BLOOD PRESSURE: 60 MMHG | SYSTOLIC BLOOD PRESSURE: 118 MMHG

## 2018-11-06 DIAGNOSIS — Z09 POSTOPERATIVE EXAMINATION: Primary | ICD-10-CM

## 2018-11-06 PROCEDURE — 99024 POSTOP FOLLOW-UP VISIT: CPT | Performed by: OBSTETRICS & GYNECOLOGY

## 2018-11-06 NOTE — PROGRESS NOTES
Subjective     Guera Ascencio is a 25 y o  female who presents to the office s/p RLTCS with complaints of lumps under skin incision  Noticed a few days ago, left>right  No redness of skin, incision drainage   No nausea and vomiting, fever, chills    The following portions of the patient's history were reviewed and updated as appropriate: allergies, current medications, past family history, past medical history, past social history, past surgical history and problem list     Review of Systems  Pertinent items are noted in HPI  Objective     /60   Wt 90 7 kg (200 lb)   LMP 01/22/2018 (Approximate)   BMI 30 41 kg/m²   General:  alert and oriented, in no acute distress   Abdomen: soft, bowel sounds active, non-tender   Incision:   healing well, no drainage, no erythema, no hernia, no seroma, no swelling, 2 cm lump on left side, 1 cm on right - likely facial, no dehiscence, incision well approximated         Assessment      Doing well postoperatively  Operative findings again reviewed  Pathology report discussed  Plan     1  Continue any current medications  2  Wound care discussed  Warm/cold compresses, NSAIDS  3   Activity restrictions: no lifting more than 25 pounds

## 2018-12-05 ENCOUNTER — POSTPARTUM VISIT (OUTPATIENT)
Dept: OBGYN CLINIC | Facility: MEDICAL CENTER | Age: 28
End: 2018-12-05
Payer: COMMERCIAL

## 2018-12-05 VITALS
HEIGHT: 68 IN | DIASTOLIC BLOOD PRESSURE: 66 MMHG | SYSTOLIC BLOOD PRESSURE: 108 MMHG | BODY MASS INDEX: 30.16 KG/M2 | WEIGHT: 199 LBS

## 2018-12-05 DIAGNOSIS — Z30.011 ENCOUNTER FOR BCP (BIRTH CONTROL PILLS) INITIAL PRESCRIPTION: Primary | ICD-10-CM

## 2018-12-05 PROCEDURE — 99429 UNLISTED PREVENTIVE SERVICE: CPT | Performed by: NURSE PRACTITIONER

## 2018-12-05 RX ORDER — ACETAMINOPHEN AND CODEINE PHOSPHATE 120; 12 MG/5ML; MG/5ML
1 SOLUTION ORAL DAILY
Qty: 84 TABLET | Refills: 2 | Status: SHIPPED | OUTPATIENT
Start: 2018-12-05 | End: 2019-03-20 | Stop reason: ALTCHOICE

## 2018-12-05 NOTE — PROGRESS NOTES
OB POSTPARTUM VISIT PROGRESS NOTE  Date of Encounter: 2018    Kristine Maldonado    : 1990  (25 y o )  MR: [id number]    Subjective   Sawyerair Stevenson is in for her postpartum visit  She is now [id number]  She delivered by primary  section  She delivered a Female on 10/22  Infant's name is Octaviano Flynn  She's generally doing well, denies current pain or bleeding issues, and has no significant depression issues  She is breast feeding exclusively  History of diabetes in pregnancy No  Complications in pregnancy: No   We discussed all appropriate contraceptive options and she chooses Pop until d/c breastfeeding  Objective     /66   Ht 5' 8" (1 727 m)   Wt 90 3 kg (199 lb)   LMP 2018 (Approximate)   Breastfeeding? Yes   BMI 30 26 kg/m²     General:   appears stated age, cooperative, alert normal mood and affect   Neck: Neck: normal, supple,trachea midline, no masses   Heart: regular rate and rhythm, S1, S2 normal, no murmur, click, rub or gallop   Lungs: clear to auscultation bilaterally       Abdomen: soft, non-tender, without masses or organomegaly   Vulva: normal female genitalia   Vagina: normal vagina, no discharge, exudate, lesion, or erythema   Urethra: normal   Cervix: Normal, no discharge  Uterus: normal size, contour, position, consistency, mobility, non-tender   Adnexa: normal adnexa     Assessment/Plan   Diagnoses and all orders for this visit:    Encounter for BCP (birth control pills) initial prescription  -     norethindrone (MICRONOR) 0 35 MG tablet; Take 1 tablet (0 35 mg total) by mouth daily for 84 days    Postpartum state      25 y o  y/o [id number] now 6 weeks post and doing well  1  LMP: none  2  Last pap smear:   3  Contraception: pop, will use withdrawal in addition to increase effectiveness  4  RTO in 1 year for annual visit  5  Baby and Me resources provided if needed     6  Baby: Doing well and meeting all milestones accordingly as per patient from the visit to the pediatrician  7  EPDS: 0  8  Will call if stops bf and will restart MARTINEZ Scott

## 2019-03-07 ENCOUNTER — TELEPHONE (OUTPATIENT)
Dept: OBGYN CLINIC | Facility: MEDICAL CENTER | Age: 29
End: 2019-03-07

## 2019-03-07 NOTE — TELEPHONE ENCOUNTER
Per John @ [facility] [address] 3/7/19 Paragard insertion will be covered at 166%, no precert required  Lmovm for pt to call back  Please schedule insertion with \[facility]\"" when pt returns call     ----- Message from Manuel Kennedy sent at 2/28/2019  8:21 AM EST -----  Patient has decided on Paragard IUD, we need to precert and then call patient back

## 2019-03-29 ENCOUNTER — TELEPHONE (OUTPATIENT)
Dept: OBGYN CLINIC | Facility: MEDICAL CENTER | Age: 29
End: 2019-03-29

## 2019-03-29 DIAGNOSIS — Q51.3 BICORNUATE UTERUS: Primary | ICD-10-CM

## 2019-03-29 NOTE — TELEPHONE ENCOUNTER
Left detailed message for pt to call to schedule ultrasound and that office would contact pt with results after and to schedule Paragard insertion if Dr Angela Guan is willing to do so after reviewing ultrasound  Order entered  ----- Message from Rylie Ogden MD sent at 3/26/2019 11:55 AM EDT -----  Regarding: RE: Paragard  I did her last section and I don't remember her having a bicornuate uterus  She's had 2 prior IUDs in the past   Let's get an ultrasound to be precautious and then i'll place it    ----- Message -----  From: Kanu Haile  Sent: 3/20/2019   6:20 PM  To: Rylie Ogden MD  Subject: Paragard                                         Would you be willing to insert Paragard for pt? Pt has bicoronate uterus  Pt was scheduled for insertion with [facility] today but [facility] did not want to insert  Thanks

## 2019-04-03 NOTE — TELEPHONE ENCOUNTER
----- Message from Ellen Stein sent at 8/23/2018  2:46 PM EDT -----  Regarding: RE: Anesthesia Consult  Pt is scheduled with Dr Divya Brown at [place] [address] in [place] on 9/12/18   ----- Message -----  From: Rakel Ritter  Sent: 8/20/2018  10:06 AM  To: Ellen Stein  Subject: Anesthesia Consult                               Pt will need anesthesia consult prior to c/s 10/22  Pt has loop recorder for SVT and had issues with nausea and vomiting after anesthesia with last c/s  Self

## 2019-06-17 ENCOUNTER — HOSPITAL ENCOUNTER (OUTPATIENT)
Dept: ULTRASOUND IMAGING | Facility: MEDICAL CENTER | Age: 29
Discharge: HOME | End: 2019-06-17
Payer: COMMERCIAL

## 2019-06-17 DIAGNOSIS — Q51.3 BICORNUATE UTERUS: ICD-10-CM

## 2019-06-17 PROCEDURE — 76830 TRANSVAGINAL US NON-OB: CPT

## 2019-06-17 PROCEDURE — 76856 US EXAM PELVIC COMPLETE: CPT

## 2019-07-05 ENCOUNTER — TELEPHONE (OUTPATIENT)
Dept: OBGYN CLINIC | Facility: MEDICAL CENTER | Age: 29
End: 2019-07-05

## 2019-07-05 NOTE — TELEPHONE ENCOUNTER
----- Message from Lee Melendez sent at 7/5/2019  9:39 AM EDT -----  I contacted pt's insurance  Effective 5/1/19  Pt is covered for insertion, removal and device at 100%  No PA needed  Pinky B  7/5/19 at 5576  Pt is aware  Appt scheduled  ----- Message -----  From: Geovanna Lucas RN  Sent: 7/1/2019   2:17 PM EDT  To: Lee Melendez    Wants paragard    Can you check for prior auth

## 2019-07-31 ENCOUNTER — TELEPHONE (OUTPATIENT)
Dept: OBGYN CLINIC | Facility: MEDICAL CENTER | Age: 29
End: 2019-07-31

## 2019-07-31 NOTE — TELEPHONE ENCOUNTER
I returned patient's call; her concern is about having a Paraguard IUD and a billing issue with [facility]  I advised her to speak with our , Javier Marvin who can direct her to the appropriate department for her concerns  She said she would call

## 2020-02-11 ENCOUNTER — OFFICE VISIT (OUTPATIENT)
Dept: OBGYN CLINIC | Facility: MEDICAL CENTER | Age: 30
End: 2020-02-11
Payer: COMMERCIAL

## 2020-02-11 VITALS
SYSTOLIC BLOOD PRESSURE: 122 MMHG | HEIGHT: 68 IN | WEIGHT: 190 LBS | BODY MASS INDEX: 28.79 KG/M2 | DIASTOLIC BLOOD PRESSURE: 72 MMHG

## 2020-02-11 DIAGNOSIS — Z30.013 ENCOUNTER FOR INITIAL PRESCRIPTION OF INJECTABLE CONTRACEPTIVE: ICD-10-CM

## 2020-02-11 DIAGNOSIS — Z11.51 SCREENING FOR HUMAN PAPILLOMAVIRUS (HPV): ICD-10-CM

## 2020-02-11 DIAGNOSIS — Z01.419 ENCOUNTER FOR ROUTINE GYNECOLOGICAL EXAMINATION WITH PAPANICOLAOU SMEAR OF CERVIX: Primary | ICD-10-CM

## 2020-02-11 DIAGNOSIS — Q51.3 BICORNATE UTERUS: ICD-10-CM

## 2020-02-11 PROCEDURE — S0612 ANNUAL GYNECOLOGICAL EXAMINA: HCPCS | Performed by: OBSTETRICS & GYNECOLOGY

## 2020-02-11 RX ORDER — MEDROXYPROGESTERONE ACETATE 150 MG/ML
150 INJECTION, SUSPENSION INTRAMUSCULAR
Qty: 1 ML | Refills: 3 | Status: SHIPPED | OUTPATIENT
Start: 2020-02-11 | End: 2021-01-04

## 2020-02-11 NOTE — PROGRESS NOTES
ASSESSMENT & PLAN: Vesta Evans was seen today for gynecologic exam and contraception  Diagnoses and all orders for this visit:    Encounter for routine gynecological examination with Papanicolaou smear of cervix  -     Thinprep Tis Pap and HPV mRNA E6/E7 Reflex HPV 16,18/45    Screening for human papillomavirus (HPV)  -     Thinprep Tis Pap and HPV mRNA E6/E7 Reflex HPV 16,18/45    Bicornate uterus  -     Cancel: US pelvis complete w transvaginal; Future  -     medroxyPROGESTERone (DEPO-PROVERA) 150 mg/mL injection; Inject 1 mL (150 mg total) into a muscle every 3 (three) months    Encounter for initial prescription of injectable contraceptive  -     medroxyPROGESTERone (DEPO-PROVERA) 150 mg/mL injection; Inject 1 mL (150 mg total) into a muscle every 3 (three) months    Discussion/Summary: Patient here for yearly gyn preventive exam; discussed contraception; she will try depo-provera inj  Rx sent, but to have first injection during her menses; Patient will call and come in for injection  1   Routine well woman exam done today  2  Pap and HPV:  Patient's pap is not current  Pap and cotesting was done today  Current ASCCP Guidelines reviewed  3   The following were reviewed in today's visit: breast self exam, adequate intake of calcium and vitamin D, exercise and healthy diet  4   F/u in 1 year  CC:  Annual Gynecologic Examination    HPI: Ashlyn Mandujano is a 30 y o  who presents for annual gynecologic examination  She has the following concerns:  contraceptive    Health Maintenance:    Patient reports her health to be good  She does have weight concerns  She exercises 5 days per week with work and walking  She does wear her seatbelt routinely  She does perform regular monthly self breast exams  She does feels safe at home  Patients does not follow a  diet  She gets 5 servings of dairy or calcium rich foods a day      Patient Active Problem List   Diagnosis    Bicornate uterus    SVT (supraventricular tachycardia) (HCC)    Third trimester pregnancy    H/O  section complicating pregnancy    39 weeks gestation of pregnancy    Palpitations    Polyhydramnios in stein pregnancy in third trimester    Significant discrepancy between uterine size and clinical dates, antepartum, third trimester    Low back pain radiating to right lower extremity    Pubic bone pain    Sinus tachycardia    Maternal congenital cardiac anomaly complicating pregnancy in third trimester    S/P  section    Polyhydramnios       Past Medical History:   Diagnosis Date    Bicornate uterus     noted in previous ultrasound report    Liver disease     has spots on her liver    Liver spots     Migraine     history of    Miscarriage     sab x1    SVT (supraventricular tachycardia) ([address] )     last episode 2017 with 45 minute syncope, loop monitor implanted 2016    Urinary tract infection     infrequent UTI    Varicella     positive disease       Past Surgical History:   Procedure Laterality Date    CARDIAC ELECTROPHYSIOLOGY MAPPING AND ABLATION  2016    CARDIAC LOOP RECORDER       SECTION      x2    CHOLECYSTECTOMY  2017    Lap Eleanor    CYST REMOVAL      [place]  DELIVERY ONLY Bilateral 10/22/2018    Procedure:  SECTION () REPEAT;  Surgeon: Marilee Garcia MD;  Location: [facility];  Service: Obstetrics    TONSILECTOMY AND ADNOIDECTOMY         Past OB/Gyn History:  Pt does not have menstrual issues       History of sexually transmitted infection: No   History of abnormal pap smears: No      Patient is currently sexually active  monogamous    The current method of family planning is none      Family History   Problem Relation Age of Onset    Arthritis Mother     Deafness Mother     Infertility Mother    David Hiss / [place] Mother     Breast cancer Maternal Grandmother         under 39years old    Diabetes Other     No Known Problems Father     Lymphoma Paternal Grandfather     Valvular heart disease Paternal Grandfather     Seizures Sister        Social History:  Social History     Socioeconomic History    Marital status: /Civil Union     Spouse name: Not on file    Number of children: 2    Years of education: Not on file    Highest education level: Not on file   Occupational History    Not on file   Social Needs    Financial resource strain: Not on file    Food insecurity:     Worry: Not on file     Inability: Not on file    Transportation needs:     Medical: Not on file     Non-medical: Not on file   Tobacco Use    Smoking status: Never Smoker    Smokeless tobacco: Never Used   Substance and Sexual Activity    Alcohol use: No    Drug use: No    Sexual activity: Yes     Partners: Male     Birth control/protection: IUD   Lifestyle    Physical activity:     Days per week: Not on file     Minutes per session: Not on file    Stress: Not on file   Relationships    Social connections:     Talks on phone: Not on file     Gets together: Not on file     Attends Jewish service: Not on file     Active member of club or organization: Not on file     Attends meetings of clubs or organizations: Not on file     Relationship status: Not on file    Intimate partner violence:     Fear of current or ex partner: Not on file     Emotionally abused: Not on file     Physically abused: Not on file     Forced sexual activity: Not on file   Other Topics Concern    Not on file   Social History Narrative    Exercises 3 times weekly    Pets: Dog     Presently lives with family    Patient is currently employed   Allergies   Allergen Reactions    Pollen Extract Allergic Rhinitis       Current Outpatient Medications:     medroxyPROGESTERone (DEPO-PROVERA) 150 mg/mL injection, Inject 1 mL (150 mg total) into a muscle every 3 (three) months, Disp: 1 mL, Rfl: 3      Review of Systems  Constitutional :no fever, feels well, no tiredness, no recent weight gain or loss  ENT: no ear ache, no loss of hearing, no nosebleeds or nasal discharge, no sore throat or hoarseness  Cardiovascular: no complaints of slow or fast heart beat, no chest pain, no palpitations, no leg claudication or lower extremity edema  Respiratory: no complaints of shortness of shortness of breath, no GARCIAS  Breasts:no complaints of breast pain, breast lump, or nipple discharge  Gastrointestinal: no complaints of abdominal pain, constipation, nausea, vomiting, or diarrhea or bloody stools  Genitourinary : no complaints of dysuria, incontinence, pelvic pain, no dysmenorrhea, vaginal discharge or abnormal vaginal bleeding and as noted in HPI  Musculoskeletal: no complaints of arthralgia, no myalgia, no joint swelling or stiffness, no limb pain or swelling  Integumentary: no complaints of skin rash or lesion, itching or dry skin  Neurological: no complaints of headache, no confusion, no numbness or tingling, no dizziness or fainting    Physical Exam:   /72   Ht 5' 8" (1 727 m)   Wt 86 2 kg (190 lb)   LMP 02/11/2020   Breastfeeding No   BMI 28 89 kg/m²     General: appears stated age, cooperative, alert normal mood and affect   Psychiatric oriented to person, place and time  Mood and affect normal   Neck: normal, supple,trachea midline, no masses    Thyroid: normal, no thyromegaly   Heart: regular rate and rhythm, S1, S2 normal, no murmur, click, rub or gallop   Lungs: clear to auscultation bilaterally, no increased work of breathing or signs of respiratory distress   Breasts: normal, no dimpling or skin changes noted   Abdomen: soft, non-tender, without masses or organomegaly   Vulva: normal , no lesions   Vagina: normal , no lesions or dryness   Urethra: normal   Urethal meatus normal   Bladder Normal, soft, non-tender and no prolapse or masses appreciated   Cervix: normal, no palpable masses ; ec and c pap and HPV culture done   Uterus: normal , non-tender, not enlarged, no palpable masses   Adnexa: normal, non-tender without fullness or masses   Lymphatic Palpation of lymph nodes in neck, axilla, groin and/or other locations: no lymphadenopathy or masses noted   Skin Normal skin turgor and no rashes    Palpation of skin and subcutaneous tissue normal

## 2020-02-14 LAB
CLINICAL INFO: NORMAL
CYTO CVX: NORMAL
DATE PREVIOUS BX: NORMAL
HPV E6+E7 MRNA CVX QL NAA+PROBE: NOT DETECTED
LMP START DATE: NORMAL
SL AMB PREV. PAP:: NORMAL
SPECIMEN SOURCE CVX/VAG CYTO: NORMAL

## 2020-02-21 ENCOUNTER — CLINICAL SUPPORT (OUTPATIENT)
Dept: OBGYN CLINIC | Facility: MEDICAL CENTER | Age: 30
End: 2020-02-21
Payer: COMMERCIAL

## 2020-02-21 ENCOUNTER — TELEPHONE (OUTPATIENT)
Dept: OBGYN CLINIC | Facility: MEDICAL CENTER | Age: 30
End: 2020-02-21

## 2020-02-21 DIAGNOSIS — Z30.42 ENCOUNTER FOR DEPO-PROVERA CONTRACEPTION: Primary | ICD-10-CM

## 2020-02-21 DIAGNOSIS — Z32.02 ENCOUNTER FOR PREGNANCY TEST, RESULT NEGATIVE: ICD-10-CM

## 2020-02-21 LAB — SL AMB POCT URINE HCG: NEGATIVE

## 2020-02-21 PROCEDURE — 96372 THER/PROPH/DIAG INJ SC/IM: CPT | Performed by: OBSTETRICS & GYNECOLOGY

## 2020-02-21 PROCEDURE — 81025 URINE PREGNANCY TEST: CPT | Performed by: OBSTETRICS & GYNECOLOGY

## 2020-02-21 RX ORDER — MEDROXYPROGESTERONE ACETATE 150 MG/ML
150 INJECTION, SUSPENSION INTRAMUSCULAR ONCE
Status: COMPLETED | OUTPATIENT
Start: 2020-02-21 | End: 2020-02-21

## 2020-02-21 RX ADMIN — MEDROXYPROGESTERONE ACETATE 150 MG: 150 INJECTION, SUSPENSION INTRAMUSCULAR at 13:38

## 2020-02-21 NOTE — TELEPHONE ENCOUNTER
Pt started her menses today and would like to know if it's ok to start her depo today or should she wait until Monday

## 2020-02-21 NOTE — PROGRESS NOTES
Pt presents for first Depo injection  Depo administered IM right deltoid  LMP 2/21/20  UPT negative in office today  Waiver signed-pt unable to wait 15 mins, has been on Depo in past and did well  Pt supplied      [facility] [address] [place] [address] [id number]  EXP 1/31/24

## 2020-05-04 ENCOUNTER — TELEPHONE (OUTPATIENT)
Dept: CARDIOLOGY CLINIC | Facility: CLINIC | Age: 30
End: 2020-05-04

## 2020-05-18 RX ORDER — MEDROXYPROGESTERONE ACETATE 150 MG/ML
150 INJECTION, SUSPENSION INTRAMUSCULAR
Status: CANCELLED | OUTPATIENT
Start: 2020-05-21 | End: 2021-02-14

## 2020-05-21 ENCOUNTER — CLINICAL SUPPORT (OUTPATIENT)
Dept: OBGYN CLINIC | Facility: MEDICAL CENTER | Age: 30
End: 2020-05-21
Payer: COMMERCIAL

## 2020-05-21 DIAGNOSIS — Z30.42 ENCOUNTER FOR DEPO-PROVERA CONTRACEPTION: Primary | ICD-10-CM

## 2020-05-21 PROCEDURE — 96372 THER/PROPH/DIAG INJ SC/IM: CPT | Performed by: OBSTETRICS & GYNECOLOGY

## 2020-05-21 RX ORDER — MEDROXYPROGESTERONE ACETATE 150 MG/ML
150 INJECTION, SUSPENSION INTRAMUSCULAR
Status: COMPLETED | OUTPATIENT
Start: 2020-05-21 | End: 2020-08-13

## 2020-05-21 RX ADMIN — MEDROXYPROGESTERONE ACETATE 150 MG: 150 INJECTION, SUSPENSION INTRAMUSCULAR at 13:29

## 2020-08-13 ENCOUNTER — CLINICAL SUPPORT (OUTPATIENT)
Dept: OBGYN CLINIC | Facility: MEDICAL CENTER | Age: 30
End: 2020-08-13
Payer: COMMERCIAL

## 2020-08-13 DIAGNOSIS — Z30.42 ENCOUNTER FOR MANAGEMENT AND INJECTION OF DEPO-PROVERA: ICD-10-CM

## 2020-08-13 PROCEDURE — 96372 THER/PROPH/DIAG INJ SC/IM: CPT | Performed by: OBSTETRICS & GYNECOLOGY

## 2020-08-13 RX ADMIN — MEDROXYPROGESTERONE ACETATE 150 MG: 150 INJECTION, SUSPENSION INTRAMUSCULAR at 13:05

## 2020-08-13 NOTE — PROGRESS NOTES
Here for depo  Patient supplied  Given IM in left upper puter quad    NDC# [address]-2  [facility]#[id number]  Exp 10/31/23

## 2020-09-15 NOTE — PROGRESS NOTES
Electrophysiology Follow Up    [address]  [address]       Trey Grahamn  : 1990  MRN: [id number]    Date of Visit: 2020       Assessment/Plan     1  SVT (supraventricular tachycardia) (HCC)  POCT ECG   2  Palpitations         ASSESSMENT:  1  SVT, with prior attempted ablation 2016              A ) per report, an atrial tachycardia was mapped to a location near the AV node, however the procedure was aborted due to concerns of AV lorraine injury              B ) previously tried on multiple medications, including flecainide, verapamil, propranolol, and metoprolol, however all caused side effects              C ) Medtronic loop recorder in situ, at EOS  2  History of normal LV systolic function per echo 2016   3  Nonischemic exercise stress echo 3/2016      DISCUSSION/SUMMARY:  1  Recurrent symptomatic SVT, with prior ablation attempt 2016 - as noted above, outside ablation reports suggested atrial tachycardia which was mapped near her AV node  Thus, an ablation was not performed due to concern for damage to her native conduction system  The episodes which she describes today sound more consistent with a reentrant tachycardia, however she reports that they are unchanged from prior episodes  Given that her episodes have become more frequent and longer lasting, she is now considering a repeat ablation  As noted below, she has previously been seen at [place] [address] for 2nd opinions and she would like our opinion in regard to where she should have a repeat procedure done  I discussed SVT ablation with her in general, including both RF ablation and cryoablation, and the expected postop recovery  All questions were answered  I assured her that I would discuss the above with Dr Rina Salinas, and I will contact her with his recommendations regarding a repeat ablation      2   Medtronic loop recorder in situ - this was initially implanted in 2015  I tried interrogating today, but it is clearly at end of service  This can likely be removed at the time of an ablation, and she would be in agreement with this plan  She question whether another 1 should be placed, but I am unsure if this is necessary  She does have an Apple watch, and will look into trying to store ECG strips  As a means of monitoring moving forward  3   History of normal LV systolic function, prior nonischemic exercise stress echo - she is euvolemic on exam, with no signs or symptoms consistent with congestive heart failure  While she admitted to chest tightness for several days following an episode of SVT, at baseline she is asymptomatic  She exercises on a regular basis without exertional chest pain, pressure, or other symptoms  No need for repeat testing at this time  As noted above, I will discuss this patient with Dr Rajan Pink to get his opinion regarding a repeat EPS/ablation  I will call the patient next week with our recommendations  She knows to contact us in the meantime with any questions, concerns, or changes in symptoms  History of Present Illness     CHIEF COMPLAINT:  Recurrent SVT    HPI/INTERVAL HISTORY: Handy Schmidt is a 26 y o  female with SVT, structurally normal heart, and prior nonischemic exercise stress echocardiogram   She has followed with Dr Rajan Pink in the past   She had a [employer] Reveal loop recorder implanted 12/2015 at [facility], and subsequently underwent an attempted SVT ablation 1/2016 there as well  Per reports, an atrial tachycardia was mapped near the AV node, but an ablation was not completed so as to not risk injury to her native conduction system  She states that she has had several second opinions regarding her arrhythmia, including [address], and everyone has advised that she may eventually need a repeat ablation    She however previously stated that since her gallbladder was removed 07/2017, her symptoms dramatically improved and she did well from an extended period  I last saw her 9/2018 in the setting of increased palpitations during pregnancy  She reported the sudden onset of sweating, dizziness, nausea, headache, the feeling as though her heart was racing, as well as being unable to catch her breath  These episodes were sometimes associated with presyncope, but she denied associated syncope  She felt these episodes were slightly different than her episodes of SVT that she had experienced in the past  A holter monitor at that time showed periods of sinus tachycardia, no evidence of SVT, and no significant ectopy  Ongoing monitoring was recommended  Upon review of her history, she was previously tried on beta-blockers (metoprolol and propranolol) as well as verapamil however did not tolerate them well  She was tried on low-dose flecainide after following at [facility], however she also had side effects  More recently, she has not been on any antiarrhythmic or AV lorraine agents  She asked to be seen today due to worsening symptoms and ongoing palpitations  Over the recent past, she has felt an increase in episodes  She had 2 episodes recently which lasted longer than normal, up to 15 minutes  There was sudden onset, with home monitoring showing her rate rise from the 60s up to the 180s to 190s  Her episodes are associated with palpitations, lightheadedness, weakness, and she then experiences chest tightness for several days following  She reports that her recent episodes are consistent with the 1 she has experienced in the past   When she is not experiencing episodes, she is stable at baseline without cardiac complaints  She exercises regularly, without chest pain or pressure, shortness of breath, or other symptoms  She admits to occasional dizziness when bending over and standing up quickly, possibly related to orthostasis      She reports having a difficult recovery following her initial ablation, which is why she has been hesitant to undergo a 2nd one  She had significant groin soreness for several weeks, but her main issue was neurologic findings which lasted for several months  While initially it was felt to be a side effect of anesthesia, she eventually was seen by Neurology given ongoing symptoms  She reportedly was not able to drive for several months given the extreme nature of her symptoms (reported visual changes while driving)  She was tried on steroids and other medications, but after several months without improvement she eventually weaned herself off of all medications and her symptoms eventually subsided  Review of Systems   Constitutional: Negative for fatigue  Respiratory: Positive for chest tightness (  Following episode of SVT)  Negative for shortness of breath  Cardiovascular: Positive for palpitations  Negative for chest pain and leg swelling  Neurological: Positive for dizziness ( associated with SVT) and light-headedness ( occasionally when changing positions, also associated with SVT)  Negative for syncope  ROS as noted above, otherwise 12 point review of systems was performed and is negative  Historical Information  - I have personally reviewed and updated this information, including social history, medical history, and family history    Social History     Socioeconomic History    Marital status: /Civil Union     Spouse name: Not on file    Number of children: 2    Years of education: Not on file    Highest education level: Not on file   Occupational History    Not on file   Social Needs    Financial resource strain: Not on file    Food insecurity     Worry: Not on file     Inability: Not on file   [employer] needs     Medical: Not on file     Non-medical: Not on file   Tobacco Use    Smoking status: Never Smoker    Smokeless tobacco: Never Used   Substance and Sexual Activity    Alcohol use: No    Drug use: No    Sexual activity: Yes     Partners: Male     Birth control/protection: I U D     Lifestyle    Physical activity     Days per week: Not on file     Minutes per session: Not on file    Stress: Not on file   Relationships    Social connections     Talks on phone: Not on file     Gets together: Not on file     Attends Jew service: Not on file     Active member of club or organization: Not on file     Attends meetings of clubs or organizations: Not on file     Relationship status: Not on file    Intimate partner violence     Fear of current or ex partner: Not on file     Emotionally abused: Not on file     Physically abused: Not on file     Forced sexual activity: Not on file   Other Topics Concern    Not on file   Social History Narrative    Exercises 3 times weekly    Pets: Dog     Past Medical History:   Diagnosis Date    Bicornate uterus     noted in previous ultrasound report    Liver disease     has spots on her liver    Liver spots     Migraine     history of    Miscarriage     sab x1    SVT (supraventricular tachycardia) ([facility] [address] )     last episode  with 45 minute syncope, loop monitor implanted 2016    Urinary tract infection     infrequent UTI    Varicella     positive disease     Past Surgical History:   Procedure Laterality Date    CARDIAC ELECTROPHYSIOLOGY MAPPING AND ABLATION  2016    CARDIAC LOOP RECORDER       SECTION      x2    CHOLECYSTECTOMY  2017    Lap Eleanor    CYST REMOVAL      IN  DELIVERY ONLY Bilateral 10/22/2018    Procedure:  SECTION () REPEAT;  Surgeon: Marielos Medeiros MD;  [facility];  Service: Obstetrics    TONSILECTOMY AND ADNOIDECTOMY       Social History     Substance and Sexual Activity   Alcohol Use No     Social History     Substance and Sexual Activity   Drug Use No     Social History     Tobacco Use   Smoking Status Never Smoker   Smokeless Tobacco Never Used     Family History Problem Relation Age of Onset    Arthritis Mother     Deafness Mother     Infertility Mother    [de-identified] / [place] Mother     Breast cancer Maternal Grandmother         under 39years old    Diabetes Other     No Known Problems Father     Lymphoma Paternal Grandfather     Valvular heart disease Paternal Grandfather     Seizures Sister        Meds/Allergies       Current Outpatient Medications:     medroxyPROGESTERone (DEPO-PROVERA) 150 mg/mL injection, Inject 1 mL (150 mg total) into a muscle every 3 (three) months, Disp: 1 mL, Rfl: 3    Allergies   Allergen Reactions    Pollen Extract Allergic Rhinitis       Objective   Vitals: Blood pressure 98/68, pulse 66, temperature 97 7 °F (36 5 °C), temperature source Temporal, height 5' 8" (1 727 m), weight 88 2 kg (194 lb 8 oz), not currently breastfeeding  Physical Exam  Vitals signs reviewed  Constitutional:       Appearance: She is well-developed  HENT:      Head: Normocephalic and atraumatic  Eyes:      Pupils: Pupils are equal, round, and reactive to light  Neck:      Musculoskeletal: Neck supple  Vascular: No JVD  Cardiovascular:      Rate and Rhythm: Normal rate and regular rhythm  Heart sounds: S1 normal and S2 normal  No murmur  No friction rub  No gallop  Pulmonary:      Effort: Pulmonary effort is normal  No respiratory distress  Breath sounds: Normal breath sounds  No wheezing or rales  Abdominal:      Palpations: Abdomen is soft  Musculoskeletal: Normal range of motion  Skin:     General: Skin is warm and dry  Findings: No rash  Neurological:      Mental Status: She is alert and oriented to person, place, and time  Cranial Nerves: No cranial nerve deficit  Labs:  No visits with results within 6 Month(s) from this visit     Latest known visit with results is:   Clinical Support on 02/21/2020   Component Date Value    URINE HCG 02/21/2020 negative        Imaging: I have personally reviewed pertinent reports  ECHO:   Results for orders placed during the hospital encounter of 18   Echo complete with contrast if indicated    Narrative [address]  [facility], [address] [facility]  [phone]    Transthoracic Echocardiogram  2D, M-mode, Doppler, and Color Doppler    Study date:  30-Aug-2018    Patient: Марина Thompson  MR number: [id number]  Account number: [de-identified]  : 10-Dontrell-1990  Age: 25 years  Gender: Female  Status: Outpatient  Location: Echo lab  Height: 68 in  Weight: 215 6 lb  BP: 100/ 60 mmHg    Indications: SVT    Diagnoses: I47 1 - Supraventricular tachycardia    Sonographer:  [address]  Primary Physician:  Montell Canavan, DO  Referring Physician:  Steve Batres PA-C  Group:  [facility]  Cardiology Fellow:  Omaira Hart  Interpreting Physician:  Winnie Orantes MD    SUMMARY    LEFT VENTRICLE:  There were no regional wall motion abnormalities  There was no evidence of concentric hypertrophy  MITRAL VALVE:  There was trace regurgitation  AORTIC VALVE:  There was trace regurgitation  TRICUSPID VALVE:  There was trace regurgitation  PULMONIC VALVE:  There was trace regurgitation  HISTORY: PRIOR HISTORY: Palpitations, SVT/ablation, pregnancy (31 weeks gestation at time of study)    PROCEDURE: The procedure was performed in the echo lab  This was a routine study  The transthoracic approach was used  The study included complete 2D imaging, M-mode, complete spectral Doppler, and color Doppler  The heart rate was 75 bpm,  at the start of the study  Image quality was adequate  LEFT VENTRICLE: LV mass 111g, normal for BSA Size was normal  Systolic function was by visual assessment  Ejection fraction was estimated to be 55 %  There were no regional wall motion abnormalities  Wall thickness was normal  There was no  evidence of concentric hypertrophy   DOPPLER: Left ventricular diastolic function parameters were normal     RIGHT VENTRICLE: The size was normal  Systolic function was normal     LEFT ATRIUM: Size was normal     RIGHT ATRIUM: Size was at the upper limits of normal     MITRAL VALVE: Valve structure was normal  There was normal leaflet separation  DOPPLER: The transmitral velocity was within the normal range  There was no evidence for stenosis  There was trace regurgitation  AORTIC VALVE: The valve was trileaflet  DOPPLER: Transaortic velocity was within the normal range  There was trace regurgitation  TRICUSPID VALVE: The valve structure was normal  There was normal leaflet separation  DOPPLER: The transtricuspid velocity was within the normal range  There was no evidence for stenosis  There was trace regurgitation  The tricuspid jet  envelope definition was inadequate for estimation of RV systolic pressure  There are no indirect findings (abnormal RV volume or geometry, altered pulmonary flow velocity profile, or leftward septal displacement) which would suggest  moderate or severe pulmonary hypertension  PULMONIC VALVE: Leaflets exhibited normal thickness, no calcification, and normal cuspal separation  DOPPLER: The transpulmonic velocity was within the normal range  There was trace regurgitation  PERICARDIUM: There was no pericardial effusion  AORTA: The root exhibited normal size  SYSTEMIC VEINS: IVC: Respirophasic changes were normal     MEASUREMENT TABLES    2D MEASUREMENTS  Aorta   (Reference normals)  Root diam   26 mm   (--)    SYSTEM MEASUREMENT TABLES    Apical four chamber  LVEF MOD A4C: 63 %  SI (A4C): 45 ml/m2  SV MOD A4C: 95 cm3    Tissue Doppler Imaging  LV Peak Early Bojorquez Tissue Jeremy; Lateral MA (TDI): 162 mm/s  LV Peak Early Bojorquez Tissue Jeremy; Mean; Lateral MA (TDI): 162 mm/s  LV Peak Early Bojorquez Tissue Jeremy; Medial MA (TDI): 143 mm/s    Unspecified Scan Mode  Vmax;  Antegrade Flow: 1410 mm/s  LVOT Vmax: 676 mm/s  MV Peak Jeremy/LV Peak Tissue Jeremy E-Wave; Lateral MA: 5  MV Peak Jeremy/LV Peak Tissue Jeremy E-Wave; Medial MA: 5 6  DT; Antegrade Flow: 275 ms  DT; Mean; Antegrade Flow: 275 ms  MV E/A Ratio: 2 2  MV Peak A Jeremy: 365 mm/s  MV Peak E Jeremy; Mean; Antegrade Flow: 805 mm/s    [facility]    Prepared and electronically signed by    Jeferson Katz MD  Signed 30-Aug-2018 18:34:13         EXERCISE STRESS ECHO 3/2016:    IMPRESSIONS:  Normal study after maximal exercise without reproduction of symptoms  Left  ventricular systolic function was normal      SUMMARY     STRESS RESULTS:  Duration of exercise was 9 min and 0 sec  Maximal work rate was 10 1 METs  Maximal heart rate during stress was 176 bpm ( 91 % of maximal predicted heart  rate)  Target heart rate was achieved  There was no chest pain during stress      ECG CONCLUSIONS:  The stress ECG was negative for ischemia      BASELINE:  There were no regional wall motion abnormalities  Estimated left ventricular ejection fraction was 60 %       PEAK STRESS:  There were no regional wall motion abnormalities      ECHO CONCLUSIONS:  There was no echocardiographic evidence for stress-induced ischemia      HOLTER MONITOR 9/4/2018:  INDICATIONS:   A Holter monitor - 48 hour  was obtained on 8/29/2018 for the reason of SVT      Total beats recorded-202,644      Minimum heart rate 57 beats per minute at 11:50 a m , average 92 beats per minute, maximum 138 beats per minute at 10:53 a m     This represents sinus tachycardia      No ventricular ectopy identified      Only 5 supraventricular ectopic beats recorded, 1 atrial pair, 1 late beat, 2 premature atrial contractions      A diary was not submitted      Patient appears to have removed the monitor around 2:15 a m   On day 2 and did not replace it resulting in approximately 15 hours of missed recording        IMPRESSION:     Unremarkable Holter monitor      EKG:   Normal sinus rhythm, heart rate 66 beats per minute, normal axis, no changes

## 2020-09-16 ENCOUNTER — OFFICE VISIT (OUTPATIENT)
Dept: CARDIOLOGY CLINIC | Facility: CLINIC | Age: 30
End: 2020-09-16
Payer: COMMERCIAL

## 2020-09-16 VITALS
HEIGHT: 68 IN | BODY MASS INDEX: 29.48 KG/M2 | WEIGHT: 194.5 LBS | HEART RATE: 66 BPM | TEMPERATURE: 97.7 F | SYSTOLIC BLOOD PRESSURE: 98 MMHG | DIASTOLIC BLOOD PRESSURE: 68 MMHG

## 2020-09-16 DIAGNOSIS — I47.1 SVT (SUPRAVENTRICULAR TACHYCARDIA) (HCC): Primary | ICD-10-CM

## 2020-09-16 DIAGNOSIS — R00.2 PALPITATIONS: ICD-10-CM

## 2020-09-16 PROCEDURE — 93000 ELECTROCARDIOGRAM COMPLETE: CPT | Performed by: PHYSICIAN ASSISTANT

## 2020-09-16 PROCEDURE — 99213 OFFICE O/P EST LOW 20 MIN: CPT | Performed by: PHYSICIAN ASSISTANT

## 2020-09-21 DIAGNOSIS — I47.1 SVT (SUPRAVENTRICULAR TACHYCARDIA) (HCC): Primary | ICD-10-CM

## 2020-09-21 NOTE — PROGRESS NOTES
Discussed this patient with Dr Ginette Fay - will start with a 2 week event monitor to try and correlate her symptoms with an arrhythmia  If she does not have recurrent symptoms during the first two weeks, will then order a second monitor for a total of one month of monitoring  It will be mailed to the patient, and we will contact her with the results once available  Recommendations regarding ablation moving forward can be made at that time

## 2020-09-22 ENCOUNTER — TELEPHONE (OUTPATIENT)
Dept: CARDIOLOGY CLINIC | Facility: CLINIC | Age: 30
End: 2020-09-22

## 2020-10-28 RX ORDER — MEDROXYPROGESTERONE ACETATE 150 MG/ML
150 INJECTION, SUSPENSION INTRAMUSCULAR
Status: CANCELLED | OUTPATIENT
Start: 2020-11-02 | End: 2021-04-30

## 2020-10-30 ENCOUNTER — CLINICAL SUPPORT (OUTPATIENT)
Dept: CARDIOLOGY CLINIC | Facility: CLINIC | Age: 30
End: 2020-10-30
Payer: COMMERCIAL

## 2020-10-30 DIAGNOSIS — I47.1 SVT (SUPRAVENTRICULAR TACHYCARDIA) (HCC): ICD-10-CM

## 2020-10-30 PROCEDURE — 0298T PR EXT ECG > 48HR TO 21 DAY REVIEW AND INTERPRETATN: CPT

## 2020-11-02 ENCOUNTER — CLINICAL SUPPORT (OUTPATIENT)
Dept: OBGYN CLINIC | Facility: MEDICAL CENTER | Age: 30
End: 2020-11-02
Payer: COMMERCIAL

## 2020-11-02 DIAGNOSIS — Z30.42 ENCOUNTER FOR MANAGEMENT AND INJECTION OF DEPO-PROVERA: Primary | ICD-10-CM

## 2020-11-02 PROCEDURE — 96372 THER/PROPH/DIAG INJ SC/IM: CPT | Performed by: STUDENT IN AN ORGANIZED HEALTH CARE EDUCATION/TRAINING PROGRAM

## 2020-11-02 RX ORDER — MEDROXYPROGESTERONE ACETATE 150 MG/ML
150 INJECTION, SUSPENSION INTRAMUSCULAR
Status: COMPLETED | OUTPATIENT
Start: 2020-11-02 | End: 2021-01-18

## 2020-11-02 RX ADMIN — MEDROXYPROGESTERONE ACETATE 150 MG: 150 INJECTION, SUSPENSION INTRAMUSCULAR at 11:44

## 2020-11-19 DIAGNOSIS — I47.1 SVT (SUPRAVENTRICULAR TACHYCARDIA) (HCC): Primary | ICD-10-CM

## 2021-01-04 DIAGNOSIS — Z30.013 ENCOUNTER FOR INITIAL PRESCRIPTION OF INJECTABLE CONTRACEPTIVE: ICD-10-CM

## 2021-01-04 DIAGNOSIS — Q51.3 BICORNATE UTERUS: ICD-10-CM

## 2021-01-04 RX ORDER — MEDROXYPROGESTERONE ACETATE 150 MG/ML
INJECTION, SUSPENSION INTRAMUSCULAR
Qty: 1 ML | Refills: 3 | Status: SHIPPED | OUTPATIENT
Start: 2021-01-04 | End: 2021-04-12

## 2021-01-18 ENCOUNTER — CLINICAL SUPPORT (OUTPATIENT)
Dept: OBGYN CLINIC | Facility: MEDICAL CENTER | Age: 31
End: 2021-01-18
Payer: COMMERCIAL

## 2021-01-18 DIAGNOSIS — Z30.42 ENCOUNTER FOR MANAGEMENT AND INJECTION OF DEPO-PROVERA: ICD-10-CM

## 2021-01-18 PROCEDURE — 96372 THER/PROPH/DIAG INJ SC/IM: CPT

## 2021-01-18 RX ADMIN — MEDROXYPROGESTERONE ACETATE 150 MG: 150 INJECTION, SUSPENSION INTRAMUSCULAR at 11:49

## 2021-02-16 ENCOUNTER — ANNUAL EXAM (OUTPATIENT)
Dept: OBGYN CLINIC | Facility: MEDICAL CENTER | Age: 31
End: 2021-02-16
Payer: COMMERCIAL

## 2021-02-16 VITALS — WEIGHT: 209 LBS | SYSTOLIC BLOOD PRESSURE: 110 MMHG | DIASTOLIC BLOOD PRESSURE: 80 MMHG | BODY MASS INDEX: 31.78 KG/M2

## 2021-02-16 DIAGNOSIS — Z01.419 ENCOUNTER FOR WELL WOMAN EXAM WITH ROUTINE GYNECOLOGICAL EXAM: Primary | ICD-10-CM

## 2021-02-16 PROBLEM — O40.9XX0 POLYHYDRAMNIOS: Status: RESOLVED | Noted: 2018-10-22 | Resolved: 2021-02-16

## 2021-02-16 PROBLEM — Z3A.39 39 WEEKS GESTATION OF PREGNANCY: Status: RESOLVED | Noted: 2018-08-08 | Resolved: 2021-02-16

## 2021-02-16 PROBLEM — O40.3XX0 POLYHYDRAMNIOS IN SINGLETON PREGNANCY IN THIRD TRIMESTER: Status: RESOLVED | Noted: 2018-08-30 | Resolved: 2021-02-16

## 2021-02-16 PROBLEM — O26.843 SIGNIFICANT DISCREPANCY BETWEEN UTERINE SIZE AND CLINICAL DATES, ANTEPARTUM, THIRD TRIMESTER: Status: RESOLVED | Noted: 2018-08-30 | Resolved: 2021-02-16

## 2021-02-16 PROBLEM — Z98.891 S/P CESAREAN SECTION: Status: RESOLVED | Noted: 2018-10-22 | Resolved: 2021-02-16

## 2021-02-16 PROBLEM — Z34.93 THIRD TRIMESTER PREGNANCY: Status: RESOLVED | Noted: 2018-08-08 | Resolved: 2021-02-16

## 2021-02-16 PROCEDURE — S0612 ANNUAL GYNECOLOGICAL EXAMINA: HCPCS | Performed by: OBSTETRICS & GYNECOLOGY

## 2021-02-16 RX ORDER — DOFETILIDE 0.5 MG/1
500 CAPSULE ORAL 2 TIMES DAILY
COMMUNITY
Start: 2021-01-21

## 2021-02-16 RX ORDER — RIVAROXABAN 20 MG/1
TABLET, FILM COATED ORAL
COMMUNITY
Start: 2021-01-22

## 2021-02-16 NOTE — PROGRESS NOTES
[facility] [address] [place] [address]    ASSESSMENT/PLAN: Chantal Wiggins is a 26 y o  [id number] who presents for annual gynecologic exam     Encounter for routine gynecologic examination  - Routine well woman exam completed today  - Cervical Cancer Screening: Current ASCCP Guidelines reviewed  Last Pap: 02/11/2020  Next Pap Due: 2025  - Contraceptive counseling discussed  Current contraception: depo provera    Additional problems addressed during this visit:  1  Encounter for well woman exam with routine gynecological exam        CC:  Annual Gynecologic Examination    HPI: Chantal Wiggins is a 26 y o  [id number] who presents for annual gynecologic examination  HPI  She reports  no new changes to her health  She reports no breast concerns  She gets regular periods  She has no vaginal discharge, vulvar or vaginal lesions, pelvic pain, or abnormal bleeding  She has no sexual health concerns and is currently sexually active with one male partner  She contracepts with depo  Patient had recent heart surgery with complications  We discussed weight loss and her restrictions  We also reviewed the depo as contributing to her weight gain  We discussed using Nexplanon or IUD  The following portions of the patient's history were reviewed and updated as appropriate: allergies, current medications, past family history, past medical history, obstetric history, gynecologic history, past social history, past surgical history and problem list     Review of Systems   Constitutional: Negative  HENT: Negative  Eyes: Negative  Respiratory: Negative  Cardiovascular: Negative  Gastrointestinal: Negative  Genitourinary: Negative  Musculoskeletal: Negative  All other systems reviewed and are negative  Objective:  /80   Wt 94 8 kg (209 lb)   BMI 31 78 kg/m²    Physical Exam  Vitals signs reviewed     Constitutional:       General: She is not in acute distress  Appearance: She is well-developed  HENT:      Head: Normocephalic and atraumatic  Nose: Nose normal    Neck:      Musculoskeletal: Normal range of motion  Cardiovascular:      Rate and Rhythm: Normal rate  Pulmonary:      Effort: Pulmonary effort is normal  No respiratory distress  Chest:      Breasts: Breasts are symmetrical          Right: Normal  No mass, nipple discharge, skin change or tenderness  Left: Normal  No mass, nipple discharge, skin change or tenderness  Abdominal:      General: There is no distension  Palpations: Abdomen is soft  There is no mass  Tenderness: There is no abdominal tenderness  There is no guarding or rebound  Genitourinary:     General: Normal vulva  Exam position: Lithotomy position  Labia:         Right: No lesion  Left: No lesion  Urethra: No prolapse (urethral meatus normal)  Vagina: Normal  No vaginal discharge, erythema or bleeding  Cervix: Normal       Uterus: Normal        Adnexa: Right adnexa normal and left adnexa normal    Musculoskeletal: Normal range of motion  Lymphadenopathy:      Upper Body:      Right upper body: No supraclavicular, axillary or pectoral adenopathy  Left upper body: No supraclavicular, axillary or pectoral adenopathy  Lower Body: No right inguinal adenopathy  No left inguinal adenopathy  Skin:     General: Skin is warm and dry  Neurological:      Mental Status: She is alert and oriented to person, place, and time  Psychiatric:         Behavior: Behavior normal          Thought Content:  Thought content normal          Judgment: Judgment normal

## 2021-03-19 ENCOUNTER — TELEPHONE (OUTPATIENT)
Dept: OBGYN CLINIC | Facility: MEDICAL CENTER | Age: 31
End: 2021-03-19

## 2021-03-19 NOTE — TELEPHONE ENCOUNTER
CD Images sent to the [facility] in [address] [place] [address] : Bebe Ivey

## 2021-03-30 DIAGNOSIS — Z23 ENCOUNTER FOR IMMUNIZATION: ICD-10-CM

## 2021-04-11 DIAGNOSIS — Z30.013 ENCOUNTER FOR INITIAL PRESCRIPTION OF INJECTABLE CONTRACEPTIVE: ICD-10-CM

## 2021-04-11 DIAGNOSIS — Q51.3 BICORNATE UTERUS: ICD-10-CM

## 2021-04-12 RX ORDER — MEDROXYPROGESTERONE ACETATE 150 MG/ML
INJECTION, SUSPENSION INTRAMUSCULAR
Qty: 1 ML | Refills: 3 | Status: SHIPPED | OUTPATIENT
Start: 2021-04-12 | End: 2021-04-20 | Stop reason: SDUPTHER

## 2021-04-20 ENCOUNTER — TELEPHONE (OUTPATIENT)
Dept: OBGYN CLINIC | Facility: MEDICAL CENTER | Age: 31
End: 2021-04-20

## 2021-04-20 ENCOUNTER — CLINICAL SUPPORT (OUTPATIENT)
Dept: OBGYN CLINIC | Facility: MEDICAL CENTER | Age: 31
End: 2021-04-20
Payer: COMMERCIAL

## 2021-04-20 DIAGNOSIS — Z30.42 ENCOUNTER FOR SURVEILLANCE OF INJECTABLE CONTRACEPTIVE: ICD-10-CM

## 2021-04-20 DIAGNOSIS — Z30.013 ENCOUNTER FOR INITIAL PRESCRIPTION OF INJECTABLE CONTRACEPTIVE: ICD-10-CM

## 2021-04-20 DIAGNOSIS — Q51.3 BICORNATE UTERUS: ICD-10-CM

## 2021-04-20 PROCEDURE — 96372 THER/PROPH/DIAG INJ SC/IM: CPT | Performed by: OBSTETRICS & GYNECOLOGY

## 2021-04-20 RX ORDER — MEDROXYPROGESTERONE ACETATE 150 MG/ML
150 INJECTION, SUSPENSION INTRAMUSCULAR ONCE
Status: COMPLETED | OUTPATIENT
Start: 2021-04-20 | End: 2021-04-20

## 2021-04-20 RX ADMIN — MEDROXYPROGESTERONE ACETATE 150 MG: 150 INJECTION, SUSPENSION INTRAMUSCULAR at 17:07

## 2021-04-20 NOTE — TELEPHONE ENCOUNTER
Patient came into office today for depo injection, patient requested refill for depo as she has none left after todays visit  Please review

## 2021-04-20 NOTE — PROGRESS NOTES
Patient presents for depo shot  Given IM RUOQ  Patient supplied      [address]: [id number]  AAI:[id number]  Exp: 12/31/2024

## 2021-04-21 RX ORDER — MEDROXYPROGESTERONE ACETATE 150 MG/ML
150 INJECTION, SUSPENSION INTRAMUSCULAR
Qty: 1 ML | Refills: 3 | Status: SHIPPED | OUTPATIENT
Start: 2021-04-21 | End: 2021-12-19 | Stop reason: SDUPTHER

## 2021-07-20 ENCOUNTER — CLINICAL SUPPORT (OUTPATIENT)
Dept: OBGYN CLINIC | Facility: MEDICAL CENTER | Age: 31
End: 2021-07-20
Payer: COMMERCIAL

## 2021-07-20 DIAGNOSIS — Z30.42 ENCOUNTER FOR SURVEILLANCE OF INJECTABLE CONTRACEPTIVE: Primary | ICD-10-CM

## 2021-07-20 PROCEDURE — 96372 THER/PROPH/DIAG INJ SC/IM: CPT | Performed by: STUDENT IN AN ORGANIZED HEALTH CARE EDUCATION/TRAINING PROGRAM

## 2021-07-20 RX ORDER — MEDROXYPROGESTERONE ACETATE 150 MG/ML
150 INJECTION, SUSPENSION INTRAMUSCULAR ONCE
Status: COMPLETED | OUTPATIENT
Start: 2021-07-20 | End: 2021-07-20

## 2021-07-20 RX ADMIN — MEDROXYPROGESTERONE ACETATE 150 MG: 150 INJECTION, SUSPENSION INTRAMUSCULAR at 09:32

## 2021-07-20 NOTE — PROGRESS NOTES
Here for depo  Patient supplied    Given IM in [address] [place] [address],[address]  NDC# [address]-3  Lot# [id number]  Exp 2/28/2025

## 2021-10-06 ENCOUNTER — CLINICAL SUPPORT (OUTPATIENT)
Dept: OBGYN CLINIC | Facility: MEDICAL CENTER | Age: 31
End: 2021-10-06
Payer: COMMERCIAL

## 2021-10-06 DIAGNOSIS — Z30.42 ENCOUNTER FOR SURVEILLANCE OF INJECTABLE CONTRACEPTIVE: Primary | ICD-10-CM

## 2021-10-06 PROCEDURE — 96372 THER/PROPH/DIAG INJ SC/IM: CPT | Performed by: OBSTETRICS & GYNECOLOGY

## 2021-10-07 PROCEDURE — 96372 THER/PROPH/DIAG INJ SC/IM: CPT | Performed by: ADVANCED PRACTICE MIDWIFE

## 2021-10-07 RX ORDER — MEDROXYPROGESTERONE ACETATE 150 MG/ML
150 INJECTION, SUSPENSION INTRAMUSCULAR ONCE
Status: COMPLETED | OUTPATIENT
Start: 2021-10-07 | End: 2021-10-07

## 2021-10-07 RX ADMIN — MEDROXYPROGESTERONE ACETATE 150 MG: 150 INJECTION, SUSPENSION INTRAMUSCULAR at 14:19

## 2021-12-19 DIAGNOSIS — Q51.3 BICORNATE UTERUS: ICD-10-CM

## 2021-12-19 DIAGNOSIS — Z30.013 ENCOUNTER FOR INITIAL PRESCRIPTION OF INJECTABLE CONTRACEPTIVE: ICD-10-CM

## 2021-12-20 RX ORDER — MEDROXYPROGESTERONE ACETATE 150 MG/ML
150 INJECTION, SUSPENSION INTRAMUSCULAR
Qty: 1 ML | Refills: 0 | Status: SHIPPED | OUTPATIENT
Start: 2021-12-20 | End: 2022-03-15 | Stop reason: SDUPTHER

## 2021-12-28 ENCOUNTER — CLINICAL SUPPORT (OUTPATIENT)
Dept: OBGYN CLINIC | Facility: MEDICAL CENTER | Age: 31
End: 2021-12-28
Payer: COMMERCIAL

## 2021-12-28 DIAGNOSIS — Z30.42 ENCOUNTER FOR SURVEILLANCE OF INJECTABLE CONTRACEPTIVE: Primary | ICD-10-CM

## 2021-12-28 PROCEDURE — 96372 THER/PROPH/DIAG INJ SC/IM: CPT | Performed by: STUDENT IN AN ORGANIZED HEALTH CARE EDUCATION/TRAINING PROGRAM

## 2021-12-28 RX ADMIN — MEDROXYPROGESTERONE ACETATE 150 MG: 150 INJECTION, SUSPENSION INTRAMUSCULAR at 14:38

## 2021-12-30 RX ORDER — MEDROXYPROGESTERONE ACETATE 150 MG/ML
150 INJECTION, SUSPENSION INTRAMUSCULAR ONCE
Status: COMPLETED | OUTPATIENT
Start: 2021-12-30 | End: 2021-12-28

## 2022-03-15 ENCOUNTER — ANNUAL EXAM (OUTPATIENT)
Dept: OBGYN CLINIC | Facility: CLINIC | Age: 32
End: 2022-03-15
Payer: COMMERCIAL

## 2022-03-15 VITALS
HEIGHT: 68 IN | BODY MASS INDEX: 25.46 KG/M2 | SYSTOLIC BLOOD PRESSURE: 112 MMHG | WEIGHT: 168 LBS | DIASTOLIC BLOOD PRESSURE: 70 MMHG

## 2022-03-15 DIAGNOSIS — Q51.3 BICORNATE UTERUS: ICD-10-CM

## 2022-03-15 DIAGNOSIS — Z30.42 ENCOUNTER FOR DEPO-PROVERA CONTRACEPTION: ICD-10-CM

## 2022-03-15 DIAGNOSIS — Z01.419 ENCOUNTER FOR WELL WOMAN EXAM WITH ROUTINE GYNECOLOGICAL EXAM: Primary | ICD-10-CM

## 2022-03-15 DIAGNOSIS — Z30.013 ENCOUNTER FOR INITIAL PRESCRIPTION OF INJECTABLE CONTRACEPTIVE: ICD-10-CM

## 2022-03-15 PROBLEM — I48.92 ATRIAL FLUTTER (HCC): Status: ACTIVE | Noted: 2021-01-21

## 2022-03-15 PROCEDURE — S0612 ANNUAL GYNECOLOGICAL EXAMINA: HCPCS | Performed by: OBSTETRICS & GYNECOLOGY

## 2022-03-15 PROCEDURE — 96372 THER/PROPH/DIAG INJ SC/IM: CPT | Performed by: OBSTETRICS & GYNECOLOGY

## 2022-03-15 RX ORDER — MEDROXYPROGESTERONE ACETATE 150 MG/ML
150 INJECTION, SUSPENSION INTRAMUSCULAR ONCE
Status: COMPLETED | OUTPATIENT
Start: 2022-03-15 | End: 2022-03-15

## 2022-03-15 RX ORDER — MEDROXYPROGESTERONE ACETATE 150 MG/ML
150 INJECTION, SUSPENSION INTRAMUSCULAR
Qty: 1 ML | Refills: 3 | Status: SHIPPED | OUTPATIENT
Start: 2022-03-15 | End: 2022-06-13

## 2022-03-15 RX ADMIN — MEDROXYPROGESTERONE ACETATE 150 MG: 150 INJECTION, SUSPENSION INTRAMUSCULAR at 10:05

## 2022-03-15 NOTE — PROGRESS NOTES
[facility]    ASSESSMENT/PLAN: Jonna Nieto is a 33 y o  [id number] who presents for annual gynecologic exam     Encounter for routine gynecologic examination  - Routine well woman exam completed today  - Cervical Cancer Screening: Current ASCCP Guidelines reviewed  Last Pap: 02/11/2020  Next Pap Due: 2025  - Contraceptive counseling discussed  Current contraception: depo provera    Additional problems addressed during this visit:  1  Encounter for well woman exam with routine gynecological exam    2  Encounter for Depo-Provera contraception  -     medroxyPROGESTERone acetate (DEPO-PROVERA SYRINGE) IM injection 150 mg        CC: Annual Gynecologic Examination    HPI: Jonna Nieto is a 33 y o  [id number] who presents for annual gynecologic examination  HPI  She reports  no new changes to her health  She reports no breast concerns  She has no vaginal discharge, vulvar or vaginal lesions, pelvic pain, or abnormal bleeding  She has no sexual health concerns and is currently sexually active with one male partner  She contracepts with depo and is doing well with it  She recently had breast reduction with abdominoplasty  Recovery went well  The following portions of the patient's history were reviewed and updated as appropriate: allergies, current medications, past family history, past medical history, obstetric history, gynecologic history, past social history, past surgical history and problem list     Review of Systems   Constitutional: Negative  HENT: Negative  Eyes: Negative  Respiratory: Negative  Cardiovascular: Negative  Gastrointestinal: Negative  Genitourinary: Negative  Musculoskeletal: Negative  All other systems reviewed and are negative          Objective:  /70 (BP Location: Left arm, Patient Position: Sitting, Cuff Size: Adult)   Ht 5' 8" (1 727 m)   Wt 76 2 kg (168 lb)   BMI 25 54 kg/m²    Physical Exam  Vitals reviewed  Constitutional:       General: She is not in acute distress  Appearance: She is well-developed  HENT:      Head: Normocephalic and atraumatic  Nose: Nose normal    Cardiovascular:      Rate and Rhythm: Normal rate  Pulmonary:      Effort: Pulmonary effort is normal  No respiratory distress  Chest:   Breasts: Breasts are symmetrical       Right: Normal  No mass, nipple discharge, skin change, tenderness, axillary adenopathy or supraclavicular adenopathy  Left: Normal  No mass, nipple discharge, skin change, tenderness, axillary adenopathy or supraclavicular adenopathy  Abdominal:      General: There is no distension  Palpations: Abdomen is soft  There is no mass  Tenderness: There is no abdominal tenderness  There is no guarding or rebound  Genitourinary:     General: Normal vulva  Exam position: Lithotomy position  Labia:         Right: No lesion  Left: No lesion  Urethra: No prolapse (urethral meatus normal)  Vagina: Normal  No vaginal discharge, erythema or bleeding  Cervix: Normal       Uterus: Normal        Adnexa: Right adnexa normal and left adnexa normal    Musculoskeletal:         General: Normal range of motion  Cervical back: Normal range of motion  Lymphadenopathy:      Upper Body:      Right upper body: No supraclavicular, axillary or pectoral adenopathy  Left upper body: No supraclavicular, axillary or pectoral adenopathy  Lower Body: No right inguinal adenopathy  No left inguinal adenopathy  Skin:     General: Skin is warm and dry  Neurological:      Mental Status: She is alert and oriented to person, place, and time  Psychiatric:         Behavior: Behavior normal          Thought Content:  Thought content normal          Judgment: Judgment normal

## 2022-06-08 ENCOUNTER — CLINICAL SUPPORT (OUTPATIENT)
Dept: OBGYN CLINIC | Facility: MEDICAL CENTER | Age: 32
End: 2022-06-08
Payer: COMMERCIAL

## 2022-06-08 DIAGNOSIS — Z30.42 ENCOUNTER FOR DEPO-PROVERA CONTRACEPTION: Primary | ICD-10-CM

## 2022-06-08 PROCEDURE — 96372 THER/PROPH/DIAG INJ SC/IM: CPT

## 2022-06-08 RX ADMIN — MEDROXYPROGESTERONE ACETATE 150 MG: 150 INJECTION, SUSPENSION INTRAMUSCULAR at 12:15

## 2022-06-16 RX ORDER — MEDROXYPROGESTERONE ACETATE 150 MG/ML
150 INJECTION, SUSPENSION INTRAMUSCULAR
Status: SHIPPED | OUTPATIENT
Start: 2022-06-16

## 2022-08-30 ENCOUNTER — CLINICAL SUPPORT (OUTPATIENT)
Dept: OBGYN CLINIC | Facility: MEDICAL CENTER | Age: 32
End: 2022-08-30
Payer: COMMERCIAL

## 2022-08-30 ENCOUNTER — TELEPHONE (OUTPATIENT)
Dept: OBGYN CLINIC | Facility: MEDICAL CENTER | Age: 32
End: 2022-08-30

## 2022-08-30 DIAGNOSIS — Z30.42 ENCOUNTER FOR MANAGEMENT AND INJECTION OF DEPO-PROVERA: ICD-10-CM

## 2022-08-30 PROCEDURE — 96372 THER/PROPH/DIAG INJ SC/IM: CPT

## 2022-08-30 RX ADMIN — MEDROXYPROGESTERONE ACETATE 150 MG: 150 INJECTION, SUSPENSION INTRAMUSCULAR at 09:34

## 2022-08-30 NOTE — TELEPHONE ENCOUNTER
Patient had depo shot today and would like a refill for next time  Pharmacy on file  Please review when you get a chance   Thank you

## 2022-08-30 NOTE — PROGRESS NOTES
[facility]   [facility]     [id number]  [facility] [id number]  Exp date 8/31/2026     Pt supplied

## 2022-08-31 DIAGNOSIS — Z30.013 ENCOUNTER FOR INITIAL PRESCRIPTION OF INJECTABLE CONTRACEPTIVE: ICD-10-CM

## 2022-08-31 DIAGNOSIS — Q51.3 BICORNATE UTERUS: ICD-10-CM

## 2022-08-31 RX ORDER — MEDROXYPROGESTERONE ACETATE 150 MG/ML
150 INJECTION, SUSPENSION INTRAMUSCULAR
Qty: 1 ML | Refills: 3 | Status: SHIPPED | OUTPATIENT
Start: 2022-08-31 | End: 2023-02-07 | Stop reason: SDUPTHER

## 2022-11-21 ENCOUNTER — OFFICE VISIT (OUTPATIENT)
Dept: OBGYN CLINIC | Facility: MEDICAL CENTER | Age: 32
End: 2022-11-21

## 2022-11-21 DIAGNOSIS — Z30.42 DEPO-PROVERA CONTRACEPTIVE STATUS: Primary | ICD-10-CM

## 2022-11-21 RX ADMIN — MEDROXYPROGESTERONE ACETATE 150 MG: 150 INJECTION, SUSPENSION INTRAMUSCULAR at 15:46

## 2022-11-21 NOTE — PROGRESS NOTES
Patient  here for depo Injection    Given IM injection on LUOQ  [facility] [id number]  LOT  [id number]  EXP 12/31/2025    Patient tolerated well    [facility]

## 2023-02-07 ENCOUNTER — CLINICAL SUPPORT (OUTPATIENT)
Dept: OBGYN CLINIC | Facility: MEDICAL CENTER | Age: 33
End: 2023-02-07

## 2023-02-07 ENCOUNTER — APPOINTMENT (OUTPATIENT)
Dept: RADIOLOGY | Facility: MEDICAL CENTER | Age: 33
End: 2023-02-07

## 2023-02-07 DIAGNOSIS — M25.569 KNEE PAIN: ICD-10-CM

## 2023-02-07 DIAGNOSIS — Q51.3 BICORNATE UTERUS: ICD-10-CM

## 2023-02-07 DIAGNOSIS — M25.561 RIGHT KNEE PAIN, UNSPECIFIED CHRONICITY: ICD-10-CM

## 2023-02-07 DIAGNOSIS — Z30.013 ENCOUNTER FOR INITIAL PRESCRIPTION OF INJECTABLE CONTRACEPTIVE: ICD-10-CM

## 2023-02-07 DIAGNOSIS — Z30.42 ENCOUNTER FOR MANAGEMENT AND INJECTION OF DEPO-PROVERA: ICD-10-CM

## 2023-02-07 RX ORDER — MEDROXYPROGESTERONE ACETATE 150 MG/ML
150 INJECTION, SUSPENSION INTRAMUSCULAR
Qty: 1 ML | Refills: 3 | Status: SHIPPED | OUTPATIENT
Start: 2023-02-07 | End: 2023-05-08

## 2023-02-07 NOTE — TELEPHONE ENCOUNTER
Patient would need a refill for her depo it looks like it was ended on 11/29/23  Please review when you get a chance   Thank you

## 2023-02-07 NOTE — PROGRESS NOTES
Depo-provera given   [facility]     [id number]  UBT [id number]  Exp date 8/31/2026     Pt supplied

## 2023-04-17 NOTE — PROGRESS NOTES
[id number] [facility]: Ms Charlie Blum was seen today at 34w2d for NST (found under the pregnancy episode) which I reviewed the RN assessment and agree, and JODI (see ultrasound report under OB procedures tab)  Please don't hesitate to contact our office with any concerns or questions    Jonathan Rene MD Fusiform Excision Additional Text (Leave Blank If You Do Not Want): The margin was drawn around the clinically apparent lesion.  A fusiform shape was then drawn on the skin incorporating the lesion and margins.  Incisions were then made along these lines to the appropriate tissue plane and the lesion was extirpated.

## 2023-05-02 ENCOUNTER — ANNUAL EXAM (OUTPATIENT)
Dept: OBGYN CLINIC | Facility: MEDICAL CENTER | Age: 33
End: 2023-05-02

## 2023-05-02 ENCOUNTER — APPOINTMENT (OUTPATIENT)
Dept: LAB | Facility: MEDICAL CENTER | Age: 33
End: 2023-05-02

## 2023-05-02 VITALS
HEIGHT: 68 IN | DIASTOLIC BLOOD PRESSURE: 80 MMHG | BODY MASS INDEX: 26.98 KG/M2 | WEIGHT: 178 LBS | SYSTOLIC BLOOD PRESSURE: 112 MMHG

## 2023-05-02 DIAGNOSIS — Z11.3 SCREEN FOR STD (SEXUALLY TRANSMITTED DISEASE): ICD-10-CM

## 2023-05-02 DIAGNOSIS — Z01.419 ENCOUNTER FOR WELL WOMAN EXAM WITH ROUTINE GYNECOLOGICAL EXAM: Primary | ICD-10-CM

## 2023-05-02 DIAGNOSIS — Z30.013 ENCOUNTER FOR INITIAL PRESCRIPTION OF INJECTABLE CONTRACEPTIVE: ICD-10-CM

## 2023-05-02 DIAGNOSIS — Q51.3 BICORNATE UTERUS: ICD-10-CM

## 2023-05-02 DIAGNOSIS — R53.83 OTHER FATIGUE: ICD-10-CM

## 2023-05-02 LAB
BASOPHILS # BLD AUTO: 0.07 THOUSANDS/ΜL (ref 0–0.1)
BASOPHILS NFR BLD AUTO: 1 % (ref 0–1)
EOSINOPHIL # BLD AUTO: 0.4 THOUSAND/ΜL (ref 0–0.61)
EOSINOPHIL NFR BLD AUTO: 5 % (ref 0–6)
ERYTHROCYTE [DISTWIDTH] IN BLOOD BY AUTOMATED COUNT: 12.4 % (ref 11.6–15.1)
HBV SURFACE AG SER QL: NORMAL
HCT VFR BLD AUTO: 45.2 % (ref 34.8–46.1)
HGB BLD-MCNC: 15.3 G/DL (ref 11.5–15.4)
HIV 1+2 AB+HIV1 P24 AG SERPL QL IA: NORMAL
HIV 2 AB SERPL QL IA: NORMAL
HIV1 AB SERPL QL IA: NORMAL
HIV1 P24 AG SERPL QL IA: NORMAL
IMM GRANULOCYTES # BLD AUTO: 0.02 THOUSAND/UL (ref 0–0.2)
IMM GRANULOCYTES NFR BLD AUTO: 0 % (ref 0–2)
LYMPHOCYTES # BLD AUTO: 2.97 THOUSANDS/ΜL (ref 0.6–4.47)
LYMPHOCYTES NFR BLD AUTO: 38 % (ref 14–44)
MCH RBC QN AUTO: 29.6 PG (ref 26.8–34.3)
MCHC RBC AUTO-ENTMCNC: 33.8 G/DL (ref 31.4–37.4)
MCV RBC AUTO: 87 FL (ref 82–98)
MONOCYTES # BLD AUTO: 0.4 THOUSAND/ΜL (ref 0.17–1.22)
MONOCYTES NFR BLD AUTO: 5 % (ref 4–12)
NEUTROPHILS # BLD AUTO: 3.87 THOUSANDS/ΜL (ref 1.85–7.62)
NEUTS SEG NFR BLD AUTO: 51 % (ref 43–75)
NRBC BLD AUTO-RTO: 0 /100 WBCS
PLATELET # BLD AUTO: 322 THOUSANDS/UL (ref 149–390)
PMV BLD AUTO: 9.3 FL (ref 8.9–12.7)
RBC # BLD AUTO: 5.17 MILLION/UL (ref 3.81–5.12)
TREPONEMA PALLIDUM IGG+IGM AB [PRESENCE] IN SERUM OR PLASMA BY IMMUNOASSAY: NORMAL
TSH SERPL DL<=0.05 MIU/L-ACNC: 3.99 UIU/ML (ref 0.45–4.5)
WBC # BLD AUTO: 7.73 THOUSAND/UL (ref 4.31–10.16)

## 2023-05-02 RX ORDER — MEDROXYPROGESTERONE ACETATE 150 MG/ML
150 INJECTION, SUSPENSION INTRAMUSCULAR
Qty: 1 ML | Refills: 3 | Status: SHIPPED | OUTPATIENT
Start: 2023-05-02 | End: 2023-05-02 | Stop reason: SDUPTHER

## 2023-05-02 RX ORDER — MEDROXYPROGESTERONE ACETATE 150 MG/ML
150 INJECTION, SUSPENSION INTRAMUSCULAR
Qty: 1 ML | Refills: 3 | Status: SHIPPED | OUTPATIENT
Start: 2023-05-02 | End: 2023-07-31

## 2023-05-02 RX ADMIN — MEDROXYPROGESTERONE ACETATE 150 MG: 150 INJECTION, SUSPENSION INTRAMUSCULAR at 09:27

## 2023-05-02 NOTE — PROGRESS NOTES
Depo-provera Merlyn Peak     [facility] [id number]  [id number]  Exp date 9/30/2026     Pt supplied

## 2023-05-02 NOTE — PROGRESS NOTES
[facility]    ASSESSMENT/PLAN: Rafael Melo is a 27 y o  [id number] who presents for annual gynecologic exam     Encounter for routine gynecologic examination  - Routine well woman exam completed today  - Cervical Cancer Screening: Current ASCCP Guidelines reviewed  Last Pap: 02/11/2020  Next Pap Due: today  - Contraceptive counseling discussed  Current contraception: depo provera    Additional problems addressed during this visit:  1  Screen for STD (sexually transmitted disease)  -     HIV 1/2 AG/AB w Reflex [facility] for 2 yr old and above; Future  -     RPR-Syphilis Screening (Total Syphilis IGG/IGM); Future  -     Hepatitis B surface antigen; Future  -     Chlamydia/GC amplified DNA by PCR    2  Bicornate uterus  -     medroxyPROGESTERone acetate (DEPO-PROVERA SYRINGE) 150 mg/mL injection; Inject 1 mL (150 mg total) into a muscle every 3 (three) months    3  Encounter for initial prescription of injectable contraceptive  -     medroxyPROGESTERone acetate (DEPO-PROVERA SYRINGE) 150 mg/mL injection; Inject 1 mL (150 mg total) into a muscle every 3 (three) months    4  Other fatigue  -     CBC and differential; Future  -     Anemia Panel w/Reflex; Future  -     TSH, 3rd generation with Free T4 reflex; Future    5  Encounter for well woman exam with routine gynecological exam  -     Liquid-based pap, screening        CC: Annual Gynecologic Examination    HPI: Rafael Melo is a 27 y o  [id number] who presents for annual gynecologic examination  Gynecologic Exam      She reports  no new changes to her health  She reports no breast concerns  She has no vaginal discharge, vulvar or vaginal lesions, pelvic pain, or abnormal bleeding  She has no sexual health concerns and is currently sexually active with one male partner  She contracepts with depo and is doing well with it       The following portions of the patient's history were reviewed and updated as appropriate: "allergies, current medications, past family history, past medical history, obstetric history, gynecologic history, past social history, past surgical history and problem list     Review of Systems   Constitutional: Negative  HENT: Negative  Eyes: Negative  Respiratory: Negative  Cardiovascular: Negative  Gastrointestinal: Negative  Genitourinary: Negative  Musculoskeletal: Negative  All other systems reviewed and are negative  Objective:  /80 (BP Location: Left arm)   Ht 5' 8\" (1 727 m)   Wt 80 7 kg (178 lb)   BMI 27 06 kg/m²    Physical Exam  Vitals reviewed  Constitutional:       General: She is not in acute distress  Appearance: She is well-developed  HENT:      Head: Normocephalic and atraumatic  Nose: Nose normal    Cardiovascular:      Rate and Rhythm: Normal rate  Pulmonary:      Effort: Pulmonary effort is normal  No respiratory distress  Chest:   Breasts:     Breasts are symmetrical       Right: Normal  No mass, nipple discharge, skin change or tenderness  Left: Normal  No mass, nipple discharge, skin change or tenderness  Abdominal:      General: There is no distension  Palpations: Abdomen is soft  There is no mass  Tenderness: There is no abdominal tenderness  There is no guarding or rebound  Genitourinary:     General: Normal vulva  Exam position: Lithotomy position  Labia:         Right: No lesion  Left: No lesion  Urethra: No prolapse (urethral meatus normal)  Vagina: Normal  No vaginal discharge, erythema or bleeding  Cervix: Normal       Uterus: Normal        Adnexa: Right adnexa normal and left adnexa normal    Musculoskeletal:         General: Normal range of motion  Cervical back: Normal range of motion  Lymphadenopathy:      Upper Body:      Right upper body: No supraclavicular, axillary or pectoral adenopathy        Left upper body: No supraclavicular, axillary or pectoral " adenopathy  Lower Body: No right inguinal adenopathy  No left inguinal adenopathy  Skin:     General: Skin is warm and dry  Neurological:      Mental Status: She is alert and oriented to person, place, and time  Psychiatric:         Behavior: Behavior normal          Thought Content:  Thought content normal          Judgment: Judgment normal

## 2023-05-04 LAB
C TRACH DNA SPEC QL NAA+PROBE: NEGATIVE
HPV HR 12 DNA CVX QL NAA+PROBE: NEGATIVE
HPV16 DNA CVX QL NAA+PROBE: NEGATIVE
HPV18 DNA CVX QL NAA+PROBE: NEGATIVE
N GONORRHOEA DNA SPEC QL NAA+PROBE: NEGATIVE

## 2023-05-08 LAB
LAB AP GYN PRIMARY INTERPRETATION: NORMAL
Lab: NORMAL

## 2023-07-19 ENCOUNTER — CLINICAL SUPPORT (OUTPATIENT)
Dept: OBGYN CLINIC | Facility: MEDICAL CENTER | Age: 33
End: 2023-07-19
Payer: COMMERCIAL

## 2023-07-19 DIAGNOSIS — Z30.42 ENCOUNTER FOR MANAGEMENT AND INJECTION OF DEPO-PROVERA: ICD-10-CM

## 2023-07-19 DIAGNOSIS — Z30.42 ENCOUNTER FOR SURVEILLANCE OF INJECTABLE CONTRACEPTIVE: ICD-10-CM

## 2023-07-19 PROCEDURE — 96372 THER/PROPH/DIAG INJ SC/IM: CPT

## 2023-07-19 RX ADMIN — MEDROXYPROGESTERONE ACETATE 150 MG: 150 INJECTION, SUSPENSION INTRAMUSCULAR at 12:05

## 2023-07-19 NOTE — PROGRESS NOTES
Patient here for Depo injection. Given IM in left upper outer quadrant. Tolerated well. Patient supplied.     [address]: 96967-205-28  LOT: [id number]  EXP: 02/28/2027

## 2023-10-11 ENCOUNTER — CLINICAL SUPPORT (OUTPATIENT)
Dept: OBGYN CLINIC | Facility: MEDICAL CENTER | Age: 33
End: 2023-10-11
Payer: COMMERCIAL

## 2023-10-11 DIAGNOSIS — Z30.42 ENCOUNTER FOR MANAGEMENT AND INJECTION OF DEPO-PROVERA: Primary | ICD-10-CM

## 2023-10-11 PROCEDURE — 96372 THER/PROPH/DIAG INJ SC/IM: CPT

## 2023-10-11 RX ADMIN — MEDROXYPROGESTERONE ACETATE 150 MG: 150 INJECTION, SUSPENSION INTRAMUSCULAR at 08:50

## 2023-10-11 NOTE — PROGRESS NOTES
Patient here for Depo injection. Given IM in [address] Tolerated well. Patient supplied.     [address]: 62949-846-12  LOT: [id number]  EXP: 09/30/2027

## 2023-12-29 ENCOUNTER — CLINICAL SUPPORT (OUTPATIENT)
Dept: OBGYN CLINIC | Facility: MEDICAL CENTER | Age: 33
End: 2023-12-29
Payer: COMMERCIAL

## 2023-12-29 DIAGNOSIS — Z30.42 ENCOUNTER FOR MANAGEMENT AND INJECTION OF DEPO-PROVERA: Primary | ICD-10-CM

## 2023-12-29 LAB — SL AMB POCT URINE HCG: NEGATIVE

## 2023-12-29 PROCEDURE — 81025 URINE PREGNANCY TEST: CPT

## 2023-12-29 PROCEDURE — 96372 THER/PROPH/DIAG INJ SC/IM: CPT

## 2023-12-29 RX ORDER — MEDROXYPROGESTERONE ACETATE 150 MG/ML
150 INJECTION, SUSPENSION INTRAMUSCULAR ONCE
Status: COMPLETED | OUTPATIENT
Start: 2023-12-29 | End: 2023-12-29

## 2023-12-29 RX ADMIN — MEDROXYPROGESTERONE ACETATE 150 MG: 150 INJECTION, SUSPENSION INTRAMUSCULAR at 11:55

## 2023-12-29 NOTE — PROGRESS NOTES
Patient presents for depo injection given IM right buttocks. Patient request urine hcg to be done which was negative. Patient tolerated well.    NDC [address]-1  LOT [id number]  EXP 05/30/2025

## 2024-03-18 ENCOUNTER — CLINICAL SUPPORT (OUTPATIENT)
Dept: OBGYN CLINIC | Facility: MEDICAL CENTER | Age: 34
End: 2024-03-18

## 2024-03-18 DIAGNOSIS — Z30.42 ENCOUNTER FOR DEPO-PROVERA CONTRACEPTION: Primary | ICD-10-CM

## 2024-03-18 RX ADMIN — MEDROXYPROGESTERONE ACETATE 150 MG: 150 INJECTION, SUSPENSION INTRAMUSCULAR at 14:05

## 2024-03-18 NOTE — PROGRESS NOTES
Patient presents for depo injection given IM left upper quad  Patient tolerated well.     NDC [address]-1  LOT [id number]  EXP 11/30/2025

## 2024-06-06 DIAGNOSIS — Q51.3 BICORNATE UTERUS: ICD-10-CM

## 2024-06-06 DIAGNOSIS — Z30.013 ENCOUNTER FOR INITIAL PRESCRIPTION OF INJECTABLE CONTRACEPTIVE: ICD-10-CM

## 2024-06-10 RX ORDER — MEDROXYPROGESTERONE ACETATE 150 MG/ML
INJECTION, SUSPENSION INTRAMUSCULAR
Qty: 1 ML | Refills: 3 | Status: SHIPPED | OUTPATIENT
Start: 2024-06-10

## 2024-06-12 ENCOUNTER — TELEPHONE (OUTPATIENT)
Age: 34
End: 2024-06-12

## 2024-06-13 ENCOUNTER — CLINICAL SUPPORT (OUTPATIENT)
Dept: OBGYN CLINIC | Facility: MEDICAL CENTER | Age: 34
End: 2024-06-13
Payer: COMMERCIAL

## 2024-06-13 DIAGNOSIS — Z30.42 ENCOUNTER FOR DEPO-PROVERA CONTRACEPTION: Primary | ICD-10-CM

## 2024-06-13 PROCEDURE — 96372 THER/PROPH/DIAG INJ SC/IM: CPT

## 2024-06-13 RX ADMIN — MEDROXYPROGESTERONE ACETATE 150 MG: 150 INJECTION, SUSPENSION INTRAMUSCULAR at 12:57

## 2024-06-13 NOTE — PROGRESS NOTES
Patient presents for depo injection   Given IM Right upper quad  Patient tolerated well.  Pt supplied     NDC 70559-625-10  LOT [id number]  EXP 07/31/2027

## 2024-09-03 ENCOUNTER — CLINICAL SUPPORT (OUTPATIENT)
Dept: OBGYN CLINIC | Facility: MEDICAL CENTER | Age: 34
End: 2024-09-03
Payer: MEDICARE

## 2024-09-03 DIAGNOSIS — Z30.42 ENCOUNTER FOR MANAGEMENT AND INJECTION OF DEPO-PROVERA: Primary | ICD-10-CM

## 2024-09-03 DIAGNOSIS — Z32.02 NEGATIVE PREGNANCY TEST: ICD-10-CM

## 2024-09-03 LAB — SL AMB POCT URINE HCG: NEGATIVE

## 2024-09-03 PROCEDURE — 81025 URINE PREGNANCY TEST: CPT

## 2024-09-03 PROCEDURE — 96372 THER/PROPH/DIAG INJ SC/IM: CPT

## 2024-09-03 RX ORDER — MEDROXYPROGESTERONE ACETATE 150 MG/ML
150 INJECTION, SUSPENSION INTRAMUSCULAR ONCE
Status: COMPLETED | OUTPATIENT
Start: 2024-09-03 | End: 2024-09-03

## 2024-09-03 RX ADMIN — MEDROXYPROGESTERONE ACETATE 150 MG: 150 INJECTION, SUSPENSION INTRAMUSCULAR at 09:35

## 2024-09-03 NOTE — PROGRESS NOTES
Patient presents for Depo injection  Patient stated that stomach was distended and would like UPT for peace of mind  UPT results Negative  IM injection given in Left Buttocks  Patient tolerated well    PATIENT SUPPLIED    NDC [address]-4  LOT [id number]  EXP 02/28/2026

## 2024-09-18 ENCOUNTER — APPOINTMENT (OUTPATIENT)
Dept: LAB | Facility: MEDICAL CENTER | Age: 34
End: 2024-09-18
Payer: MEDICARE

## 2024-09-18 ENCOUNTER — ANNUAL EXAM (OUTPATIENT)
Dept: OBGYN CLINIC | Facility: MEDICAL CENTER | Age: 34
End: 2024-09-18
Payer: MEDICARE

## 2024-09-18 VITALS
HEIGHT: 68 IN | DIASTOLIC BLOOD PRESSURE: 60 MMHG | BODY MASS INDEX: 26.25 KG/M2 | WEIGHT: 173.2 LBS | SYSTOLIC BLOOD PRESSURE: 100 MMHG

## 2024-09-18 DIAGNOSIS — Q51.3 BICORNATE UTERUS: ICD-10-CM

## 2024-09-18 DIAGNOSIS — Z12.4 PAP SMEAR FOR CERVICAL CANCER SCREENING: ICD-10-CM

## 2024-09-18 DIAGNOSIS — Z11.3 SCREEN FOR STD (SEXUALLY TRANSMITTED DISEASE): ICD-10-CM

## 2024-09-18 DIAGNOSIS — Z30.013 ENCOUNTER FOR INITIAL PRESCRIPTION OF INJECTABLE CONTRACEPTIVE: ICD-10-CM

## 2024-09-18 DIAGNOSIS — Z01.419 ENCOUNTER FOR WELL WOMAN EXAM WITH ROUTINE GYNECOLOGICAL EXAM: Primary | ICD-10-CM

## 2024-09-18 PROCEDURE — 99395 PREV VISIT EST AGE 18-39: CPT | Performed by: OBSTETRICS & GYNECOLOGY

## 2024-09-18 PROCEDURE — G0124 SCREEN C/V THIN LAYER BY MD: HCPCS | Performed by: PATHOLOGY

## 2024-09-18 PROCEDURE — G0145 SCR C/V CYTO,THINLAYER,RESCR: HCPCS | Performed by: PATHOLOGY

## 2024-09-18 PROCEDURE — 87591 N.GONORRHOEAE DNA AMP PROB: CPT | Performed by: OBSTETRICS & GYNECOLOGY

## 2024-09-18 PROCEDURE — 87389 HIV-1 AG W/HIV-1&-2 AB AG IA: CPT

## 2024-09-18 PROCEDURE — 87491 CHLMYD TRACH DNA AMP PROBE: CPT | Performed by: OBSTETRICS & GYNECOLOGY

## 2024-09-18 PROCEDURE — 87340 HEPATITIS B SURFACE AG IA: CPT

## 2024-09-18 PROCEDURE — 86780 TREPONEMA PALLIDUM: CPT

## 2024-09-18 PROCEDURE — G0476 HPV COMBO ASSAY CA SCREEN: HCPCS | Performed by: OBSTETRICS & GYNECOLOGY

## 2024-09-18 PROCEDURE — 36415 COLL VENOUS BLD VENIPUNCTURE: CPT

## 2024-09-18 RX ORDER — MEDROXYPROGESTERONE ACETATE 150 MG/ML
150 INJECTION, SUSPENSION INTRAMUSCULAR
Qty: 1 ML | Refills: 4 | Status: SHIPPED | OUTPATIENT
Start: 2024-09-18

## 2024-09-18 NOTE — PROGRESS NOTES
"[facility]    ASSESSMENT/PLAN: Cassandra Sapmson is a 34 y.o.  who presents for annual gynecologic exam.    Encounter for routine gynecologic examination  - Routine well woman exam completed today.  - Cervical Cancer Screening: Current ASCCP Guidelines reviewed. Last Pap: 2023. Next Pap Due: today  - Contraceptive counseling discussed.  Current contraception: depo provera    Additional problems addressed during this visit:  1. Encounter for well woman exam with routine gynecological exam      CC:  Annual Gynecologic Examination    HPI: Cassandra Sampson is a 34 y.o.  who presents for annual gynecologic examination.  Gynecologic Exam    She reports  no new changes to her health.  She reports no breast concerns. She has no vaginal discharge, vulvar or vaginal lesions, pelvic pain, or abnormal bleeding.  She contracepts with depo and is doing well with it.     The following portions of the patient's history were reviewed and updated as appropriate: allergies, current medications, past family history, past medical history, obstetric history, gynecologic history, past social history, past surgical history and problem list.    Review of Systems   Constitutional: Negative.    HENT: Negative.     Eyes: Negative.    Respiratory: Negative.     Cardiovascular: Negative.    Gastrointestinal: Negative.    Genitourinary: Negative.    Musculoskeletal: Negative.    All other systems reviewed and are negative.        Objective:  /60   Ht 5' 8\" (1.727 m)   Wt 78.6 kg (173 lb 3.2 oz)   BMI 26.33 kg/m²    Physical Exam  Vitals reviewed.   Constitutional:       General: She is not in acute distress.     Appearance: She is well-developed.   HENT:      Head: Normocephalic and atraumatic.      Nose: Nose normal.   Cardiovascular:      Rate and Rhythm: Normal rate.   Pulmonary:      Effort: Pulmonary effort is normal. No respiratory distress.   Chest:   Breasts:     " Breasts are symmetrical.      Right: Normal. No mass, nipple discharge, skin change or tenderness.      Left: Normal. No mass, nipple discharge, skin change or tenderness.   Abdominal:      General: There is no distension.      Palpations: Abdomen is soft. There is no mass.      Tenderness: There is no abdominal tenderness. There is no guarding or rebound.   Genitourinary:     General: Normal vulva.      Exam position: Lithotomy position.      Labia:         Right: No lesion.         Left: No lesion.       Urethra: No prolapse (urethral meatus normal).      Vagina: Normal. No vaginal discharge, erythema or bleeding.      Cervix: Normal.      Uterus: Normal.       Adnexa: Right adnexa normal and left adnexa normal.   Musculoskeletal:         General: Normal range of motion.      Cervical back: Normal range of motion.   Lymphadenopathy:      Upper Body:      Right upper body: No supraclavicular, axillary or pectoral adenopathy.      Left upper body: No supraclavicular, axillary or pectoral adenopathy.      Lower Body: No right inguinal adenopathy. No left inguinal adenopathy.   Skin:     General: Skin is warm and dry.   Neurological:      Mental Status: She is alert and oriented to person, place, and time.   Psychiatric:         Behavior: Behavior normal.         Thought Content: Thought content normal.         Judgment: Judgment normal.

## 2024-09-19 LAB
HBV SURFACE AG SER QL: NORMAL
HIV 1+2 AB+HIV1 P24 AG SERPL QL IA: NORMAL
HIV 2 AB SERPL QL IA: NORMAL
HIV1 AB SERPL QL IA: NORMAL
HIV1 P24 AG SERPL QL IA: NORMAL
HPV HR 12 DNA CVX QL NAA+PROBE: POSITIVE
HPV16 DNA CVX QL NAA+PROBE: NEGATIVE
HPV18 DNA CVX QL NAA+PROBE: NEGATIVE
TREPONEMA PALLIDUM IGG+IGM AB [PRESENCE] IN SERUM OR PLASMA BY IMMUNOASSAY: NORMAL

## 2024-09-20 LAB
C TRACH DNA SPEC QL NAA+PROBE: NEGATIVE
N GONORRHOEA DNA SPEC QL NAA+PROBE: NEGATIVE

## 2024-09-24 LAB
LAB AP GYN PRIMARY INTERPRETATION: ABNORMAL
Lab: ABNORMAL
PATH INTERP SPEC-IMP: ABNORMAL

## 2024-10-01 ENCOUNTER — TELEPHONE (OUTPATIENT)
Age: 34
End: 2024-10-01

## 2024-10-01 NOTE — TELEPHONE ENCOUNTER
Patient calling in, states she sent provider a message 1.5 weeks ago and has not gotten response. Patient would like recommendations on positive HPV results. Results awaiting review by provider. Please also see patient message on 9/24 for reference.    Routing to provider as high priority for recommendation.

## 2024-10-02 ENCOUNTER — TELEPHONE (OUTPATIENT)
Dept: OBGYN CLINIC | Facility: MEDICAL CENTER | Age: 34
End: 2024-10-02

## 2024-10-02 NOTE — TELEPHONE ENCOUNTER
pt called to make a colpposcopy appointment the next one available with Dr. Izquierdo is 12/4 pt I asked if it would be ok? I asked Luz she told me that pt wll be fine with that date  but  we can add pt to the waiting list, I called pt and left a voice message.

## 2024-11-20 ENCOUNTER — CLINICAL SUPPORT (OUTPATIENT)
Dept: OBGYN CLINIC | Facility: MEDICAL CENTER | Age: 34
End: 2024-11-20
Payer: MEDICARE

## 2024-11-20 DIAGNOSIS — Z30.42 ENCOUNTER FOR DEPO-PROVERA CONTRACEPTION: Primary | ICD-10-CM

## 2024-11-20 PROCEDURE — 96372 THER/PROPH/DIAG INJ SC/IM: CPT

## 2024-11-20 RX ADMIN — MEDROXYPROGESTERONE ACETATE 150 MG: 150 INJECTION, SUSPENSION INTRAMUSCULAR at 09:13

## 2024-11-20 NOTE — PROGRESS NOTES
Patient presents for Depo-Provera injection.  Patient supplied medication.  Patient tolerated IM injection well .  Denied questions or concerns at conclusion of conversation.  Aware repeat injection is due in 3 months.  Encouraged to schedule prior to leaving office at today's visit.    Lot # [id number] NDC [address]-0 Expiration date 02/2026

## 2024-12-06 ENCOUNTER — PROCEDURE VISIT (OUTPATIENT)
Dept: OBGYN CLINIC | Facility: CLINIC | Age: 34
End: 2024-12-06
Payer: MEDICARE

## 2024-12-06 VITALS
BODY MASS INDEX: 27.37 KG/M2 | WEIGHT: 180.6 LBS | HEIGHT: 68 IN | SYSTOLIC BLOOD PRESSURE: 118 MMHG | DIASTOLIC BLOOD PRESSURE: 70 MMHG

## 2024-12-06 DIAGNOSIS — Z23 NEED FOR HPV VACCINATION: ICD-10-CM

## 2024-12-06 DIAGNOSIS — R87.810 ASCUS WITH POSITIVE HIGH RISK HPV CERVICAL: Primary | ICD-10-CM

## 2024-12-06 DIAGNOSIS — R87.610 ASCUS WITH POSITIVE HIGH RISK HPV CERVICAL: Primary | ICD-10-CM

## 2024-12-06 DIAGNOSIS — Z01.812 PRE-PROCEDURE LAB EXAM: ICD-10-CM

## 2024-12-06 PROCEDURE — 88305 TISSUE EXAM BY PATHOLOGIST: CPT | Performed by: STUDENT IN AN ORGANIZED HEALTH CARE EDUCATION/TRAINING PROGRAM

## 2024-12-06 PROCEDURE — 57455 BIOPSY OF CERVIX W/SCOPE: CPT | Performed by: OBSTETRICS & GYNECOLOGY

## 2024-12-06 PROCEDURE — 81025 URINE PREGNANCY TEST: CPT | Performed by: OBSTETRICS & GYNECOLOGY

## 2024-12-06 PROCEDURE — 88342 IMHCHEM/IMCYTCHM 1ST ANTB: CPT | Performed by: STUDENT IN AN ORGANIZED HEALTH CARE EDUCATION/TRAINING PROGRAM

## 2024-12-06 PROCEDURE — 90651 9VHPV VACCINE 2/3 DOSE IM: CPT | Performed by: OBSTETRICS & GYNECOLOGY

## 2024-12-06 PROCEDURE — 90471 IMMUNIZATION ADMIN: CPT | Performed by: OBSTETRICS & GYNECOLOGY

## 2024-12-06 RX ORDER — QUETIAPINE FUMARATE 25 MG/1
TABLET, FILM COATED ORAL
COMMUNITY
Start: 2024-10-28 | End: 2024-12-06

## 2024-12-06 RX ORDER — PHENTERMINE HYDROCHLORIDE 15 MG/1
15 CAPSULE ORAL
COMMUNITY
Start: 2024-11-15

## 2024-12-06 RX ORDER — BUPROPION HYDROCHLORIDE 150 MG/1
TABLET ORAL
COMMUNITY
Start: 2024-07-09

## 2024-12-06 RX ORDER — DEXTROMETHORPHAN HYDROBROMIDE AND PROMETHAZINE HYDROCHLORIDE 15; 6.25 MG/5ML; MG/5ML
SYRUP ORAL
COMMUNITY
Start: 2024-09-05 | End: 2024-12-06

## 2024-12-06 RX ORDER — BROMPHENIRAMINE MALEATE, PSEUDOEPHEDRINE HYDROCHLORIDE, AND DEXTROMETHORPHAN HYDROBROMIDE 2; 30; 10 MG/5ML; MG/5ML; MG/5ML
SYRUP ORAL
COMMUNITY
Start: 2024-10-28 | End: 2024-12-06

## 2024-12-06 RX ORDER — HYDROXYZINE HYDROCHLORIDE 10 MG/1
10 TABLET, FILM COATED ORAL 3 TIMES DAILY PRN
COMMUNITY
Start: 2024-02-06 | End: 2024-12-06

## 2024-12-06 RX ORDER — DOXYCYCLINE 100 MG/1
TABLET ORAL
COMMUNITY
Start: 2024-09-05

## 2024-12-06 NOTE — PROGRESS NOTES
Colposcopy    Date/Time: 12/6/2024 9:30 AM    Performed by: Lauren Izquierdo MD  Authorized by: Lauren Izquierdo MD    Verbal consent obtained?: Yes    Written consent obtained?: Yes    Risks and benefits: Risks, benefits and alternatives were discussed    Consent given by:  Patient  Patient states understanding of procedure being performed: Yes    Patient's understanding of procedure matches consent: Yes    Procedure consent matches procedure scheduled: Yes    Required items: Required blood products, implants, devices and special equipment available    Patient identity confirmed:  Verbally with patient  Indication:     Indication:  ASC-US  Procedure:     Procedure: Colposcopy w/ biopsy of cervix      Under satisfactory analgesia the patient was prepped and draped in the dorsal lithotomy position: yes      [place] speculum was placed in the vagina: yes      Cervix was cleansed with betadine: no      Intracervical block was performed: no      Monsel's solution was applied: no      Specimen(s) to pathology: yes    Post-procedure:     Findings: White epithelium      Impression: Low grade cervical dysplasia      Patient tolerance of procedure:  Tolerated well, no immediate complications    Discussed Gardasil, agreed to start vaccine series

## 2024-12-10 PROCEDURE — 88342 IMHCHEM/IMCYTCHM 1ST ANTB: CPT | Performed by: STUDENT IN AN ORGANIZED HEALTH CARE EDUCATION/TRAINING PROGRAM

## 2024-12-10 PROCEDURE — 88305 TISSUE EXAM BY PATHOLOGIST: CPT | Performed by: STUDENT IN AN ORGANIZED HEALTH CARE EDUCATION/TRAINING PROGRAM

## 2024-12-13 ENCOUNTER — RESULTS FOLLOW-UP (OUTPATIENT)
Dept: OBGYN CLINIC | Facility: MEDICAL CENTER | Age: 34
End: 2024-12-13

## 2025-02-07 ENCOUNTER — CLINICAL SUPPORT (OUTPATIENT)
Dept: OBGYN CLINIC | Facility: MEDICAL CENTER | Age: 35
End: 2025-02-07
Payer: COMMERCIAL

## 2025-02-07 DIAGNOSIS — Z23 ENCOUNTER FOR IMMUNIZATION: Primary | ICD-10-CM

## 2025-02-07 DIAGNOSIS — Z30.42 ENCOUNTER FOR DEPO-PROVERA CONTRACEPTION: ICD-10-CM

## 2025-02-07 PROCEDURE — 90471 IMMUNIZATION ADMIN: CPT

## 2025-02-07 PROCEDURE — 90651 9VHPV VACCINE 2/3 DOSE IM: CPT

## 2025-02-07 PROCEDURE — 96372 THER/PROPH/DIAG INJ SC/IM: CPT

## 2025-02-07 RX ADMIN — MEDROXYPROGESTERONE ACETATE 150 MG: 150 INJECTION, SUSPENSION INTRAMUSCULAR at 16:00

## 2025-02-07 NOTE — PROGRESS NOTES
Pt presented for Depo injection  Given IM RUOQ  Pt Tolerated Well  Pt Supplied    NDC: 18065-728-94  LOT: [id number]  EXP: 08/31/2028    Pt presented for 2nd HPV Injection  Given IM Left Deltoid   Pt tolerated well    ND: 4275-5653  LOT :[id number]  EXP: 08/18/2026

## 2025-04-17 ENCOUNTER — APPOINTMENT (EMERGENCY)
Dept: RADIOLOGY | Facility: HOSPITAL | Age: 35
End: 2025-04-17
Payer: COMMERCIAL

## 2025-04-17 ENCOUNTER — HOSPITAL ENCOUNTER (EMERGENCY)
Facility: HOSPITAL | Age: 35
Discharge: HOME | End: 2025-04-17
Attending: EMERGENCY MEDICINE
Payer: COMMERCIAL

## 2025-04-17 VITALS
WEIGHT: 174.82 LBS | SYSTOLIC BLOOD PRESSURE: 122 MMHG | HEART RATE: 100 BPM | DIASTOLIC BLOOD PRESSURE: 73 MMHG | RESPIRATION RATE: 18 BRPM | OXYGEN SATURATION: 100 % | TEMPERATURE: 98.4 F | BODY MASS INDEX: 26.58 KG/M2

## 2025-04-17 DIAGNOSIS — S69.91XA RIGHT WRIST INJURY, INITIAL ENCOUNTER: Primary | ICD-10-CM

## 2025-04-17 PROCEDURE — 29125 APPL SHORT ARM SPLINT STATIC: CPT

## 2025-04-17 PROCEDURE — 73110 X-RAY EXAM OF WRIST: CPT

## 2025-04-17 PROCEDURE — 73130 X-RAY EXAM OF HAND: CPT

## 2025-04-17 PROCEDURE — 99284 EMERGENCY DEPT VISIT MOD MDM: CPT

## 2025-04-17 PROCEDURE — 99283 EMERGENCY DEPT VISIT LOW MDM: CPT

## 2025-04-17 PROCEDURE — 96372 THER/PROPH/DIAG INJ SC/IM: CPT

## 2025-04-17 RX ORDER — ACETAMINOPHEN 500 MG
500 TABLET ORAL EVERY 6 HOURS PRN
Qty: 30 TABLET | Refills: 0 | Status: SHIPPED | OUTPATIENT
Start: 2025-04-17 | End: 2025-04-17

## 2025-04-17 RX ORDER — ACETAMINOPHEN 325 MG/1
975 TABLET ORAL ONCE
Status: COMPLETED | OUTPATIENT
Start: 2025-04-18 | End: 2025-04-17

## 2025-04-17 RX ORDER — ACETAMINOPHEN 500 MG
500 TABLET ORAL EVERY 6 HOURS PRN
Qty: 30 TABLET | Refills: 0 | Status: SHIPPED | OUTPATIENT
Start: 2025-04-17

## 2025-04-17 RX ORDER — ONDANSETRON 4 MG/1
4 TABLET, ORALLY DISINTEGRATING ORAL ONCE
Status: COMPLETED | OUTPATIENT
Start: 2025-04-17 | End: 2025-04-17

## 2025-04-17 RX ORDER — KETOROLAC TROMETHAMINE 30 MG/ML
15 INJECTION, SOLUTION INTRAMUSCULAR; INTRAVENOUS ONCE
Status: COMPLETED | OUTPATIENT
Start: 2025-04-18 | End: 2025-04-17

## 2025-04-17 RX ORDER — IBUPROFEN 600 MG/1
600 TABLET, FILM COATED ORAL EVERY 6 HOURS PRN
Qty: 30 TABLET | Refills: 0 | Status: SHIPPED | OUTPATIENT
Start: 2025-04-17 | End: 2025-04-17

## 2025-04-17 RX ORDER — IBUPROFEN 200 MG
600 TABLET ORAL EVERY 6 HOURS PRN
COMMUNITY

## 2025-04-17 RX ORDER — IBUPROFEN 600 MG/1
600 TABLET, FILM COATED ORAL EVERY 6 HOURS PRN
Qty: 30 TABLET | Refills: 0 | Status: SHIPPED | OUTPATIENT
Start: 2025-04-17

## 2025-04-17 RX ORDER — ONDANSETRON 4 MG/1
4 TABLET, ORALLY DISINTEGRATING ORAL EVERY 8 HOURS PRN
COMMUNITY
Start: 2025-02-22

## 2025-04-17 RX ADMIN — Medication 2.5 MG: at 23:51

## 2025-04-17 RX ADMIN — ONDANSETRON 4 MG: 4 TABLET, ORALLY DISINTEGRATING ORAL at 23:09

## 2025-04-17 RX ADMIN — ACETAMINOPHEN 975 MG: 325 TABLET, FILM COATED ORAL at 23:51

## 2025-04-17 RX ADMIN — KETOROLAC TROMETHAMINE 15 MG: 30 INJECTION, SOLUTION INTRAMUSCULAR; INTRAVENOUS at 23:52

## 2025-04-17 RX ADMIN — Medication 2.5 MG: at 23:11

## 2025-04-18 ENCOUNTER — OFFICE VISIT (OUTPATIENT)
Dept: OBGYN CLINIC | Facility: CLINIC | Age: 35
End: 2025-04-18

## 2025-04-18 VITALS — WEIGHT: 174 LBS | HEIGHT: 68 IN | BODY MASS INDEX: 26.37 KG/M2

## 2025-04-18 DIAGNOSIS — S63.501A SPRAIN OF RIGHT WRIST, INITIAL ENCOUNTER: Primary | ICD-10-CM

## 2025-04-18 DIAGNOSIS — S69.91XA RIGHT WRIST INJURY, INITIAL ENCOUNTER: ICD-10-CM

## 2025-04-18 PROCEDURE — 99203 OFFICE O/P NEW LOW 30 MIN: CPT | Performed by: ORTHOPAEDIC SURGERY

## 2025-04-18 RX ORDER — SULFAMETHOXAZOLE AND TRIMETHOPRIM 800; 160 MG/1; MG/1
1 TABLET ORAL 2 TIMES DAILY
COMMUNITY
Start: 2025-03-27

## 2025-04-18 RX ORDER — OSELTAMIVIR PHOSPHATE 75 MG/1
CAPSULE ORAL
COMMUNITY
Start: 2025-02-10

## 2025-04-18 RX ORDER — DEXTROMETHORPHAN HYDROBROMIDE AND PROMETHAZINE HYDROCHLORIDE 15; 6.25 MG/5ML; MG/5ML
SYRUP ORAL
COMMUNITY
Start: 2025-02-10

## 2025-04-18 NOTE — PROGRESS NOTES
The HAND & UPPER EXTREMITY OFFICE VISIT   Referred By:  Frida Llamas Pa-c  [address] [place] [address]        Chief Complaint:     Right wrist pain    History of Present Illness:   34 y.o., Right hand dominant female presents with Right wrist pain after fall DOI 2025    Patient states she was walking in her kids playroom when she tripped over a toy and had a fall onto her Right open hand and had immediate wrist pain as well as some numbness and tingling. She was seen on the ED that day where XR were obtained. She was also having difficulty moving her thumb due to pain and states the ED staff was concerned about tendon injury. She was told there may be a wrist fracture on the XR and the staff was also concerned for a scaphoid fracture as well. She was placed in a thumb spica Velcro splint which she presents in today. Her wrist is still painful but better mostly at the radial wrist. She has no numbness or tingling reported      ADLs: Community ambulator  Smoke: denies ETOH: denies   Drugs:  denies Job: employed       Past Medical History:  Past Medical History:   Diagnosis Date    Bicornate uterus     noted in previous ultrasound report    Liver disease     has spots on her liver    Liver spots     Migraine     history of    Miscarriage     sab x1    SVT (supraventricular tachycardia) (HCC)     last episode  with 45 minute syncope, loop monitor implanted 2016    Urinary tract infection     infrequent UTI    Varicella     positive disease     Past Surgical History:   Procedure Laterality Date    CARDIAC ELECTROPHYSIOLOGY MAPPING AND ABLATION  2016    CARDIAC LOOP RECORDER      CARDIAC SURGERY  2021    ablation for SVT     SECTION      x2    CHOLECYSTECTOMY  2017    Lap Eleanor    COMBINED REDUCTION MAMMAPLASTY W/ ABDOMINOPLASTY  2021    CYST REMOVAL      FL LUMBAR PUNCTURE DIAGNOSTIC  2016    MA  DELIVERY ONLY Bilateral 10/22/2018     Procedure:  SECTION () REPEAT;  Surgeon: Lauren Izquierdo MD;  Location: [facility];  Service: Obstetrics    TONSILECTOMY AND ADNOIDECTOMY       Family History   Problem Relation Age of Onset    Arthritis Mother     Deafness Mother     Infertility Mother     Miscarriages / Stillbirths Mother     Breast cancer Maternal Grandmother         under 45 years old    Diabetes Other     No Known Problems Father     Lymphoma Paternal Grandfather     Valvular heart disease Paternal Grandfather     Seizures Sister      Social History     Socioeconomic History    Marital status:      Spouse name: Not on file    Number of children: 2    Years of education: Not on file    Highest education level: Not on file   Occupational History    Not on file   Tobacco Use    Smoking status: Never    Smokeless tobacco: Never   Vaping Use    Vaping status: Never Used   Substance and Sexual Activity    Alcohol use: No    Drug use: No    Sexual activity: Yes     Partners: Male     Birth control/protection: Injection   Other Topics Concern    Not on file   Social History Narrative    Exercises 3 times weekly    Pets: Dog     Social Drivers of Health     Financial Resource Strain: Patient Declined (2024)    Received from [facility]    Overall Financial Resource Strain (CARDIA)     Difficulty of Paying Living Expenses: Patient declined   Food Insecurity: Patient Declined (2024)    Received from [facility]    Hunger Vital Sign     Worried About Running Out of Food in the Last Year: Patient declined     Ran Out of Food in the Last Year: Patient declined   Transportation Needs: Patient Declined (2024)    Received from [facility]    PRAPARE - Transportation     Lack of Transportation (Medical): Patient declined     Lack of Transportation (Non-Medical): Patient declined   Physical Activity: Not on file   Stress: Stress Concern Present (2024)    Received from [place]  "[facility] Occupational Health - Occupational Stress Questionnaire     Feeling of Stress : Very much   Social Connections: Feeling Socially Isolated (8/25/2024)    Received from [facility]    OASIS : Social Isolation     How often do you feel lonely or isolated from those around you?: Often   Intimate Partner Violence: Not At Risk (8/25/2024)    [facility], [facility]    Humiliation, Afraid, Rape, and Kick questionnaire     Fear of Current or Ex-Partner: No     Emotionally Abused: No     Physically Abused: No     Sexually Abused: No   Housing Stability: Patient Declined (8/25/2024)    Received from [facility]    Housing Stability Vital Sign     Unable to Pay for Housing in the Last Year: Patient declined     Number of Times Moved in the Last Year: Not on file     Homeless in the Last Year: Patient declined     Scheduled Meds:  Current Facility-Administered Medications   Medication Dose Route Frequency Provider Last Rate    medroxyPROGESTERone acetate  150 mg Intramuscular Q3 Months MARTINEZ [place]       Continuous Infusions:   PRN Meds:.  Allergies   Allergen Reactions    Pollen Extract Allergic Rhinitis           Physical Examination:    Ht 5' 8\" (1.727 m)   Wt 78.9 kg (174 lb)   BMI 26.46 kg/m²     Gen: A&Ox3, NAD  Cardiac: regular rate  Chest: non labored breathing  Abdomen: Non-distended      Right Upper Extremity:  Skin CDI  No obvious deformity of the shoulder, arm, elbow, forearm, wrist, hand  Composite fist   Tender at radial wrist, 1st and 2nd dorsal compartments mainly  Thumb extension is painful  Non tender over distal or proximal pole of the scaphoid.  Negative scaphoid squeeze test.  Sensation intact to light touch in the axillary median, ulnar, and radial nerve distributions  motor intact but minimal wrist range of motion due to pain  2+RP      Studies:  Radiographs: I " personally reviewed and independently interpreted the available radiographs.  4/18/2025: Radiographs of the Right wrist and hand, multiple views, demonstrate no acute fracture or dislocations.  Carpal alignment within normal limits.  Soft tissues unremarkable.      Assessment and Plan:  Assessment & Plan  Sprain of right wrist, initial encounter  34 y.o. female presents with signs and symptoms consistent with the above diagnosis.  We discussed the natural history of this condition and its pathogenesis.  We discussed operative and nonoperative treatment options.    XR were reviewed and I physically examined her wrist.  There is no distal radius fracture note on XR and I feel there is a low probability of a scaphoid fracture as well . I recommend she return in 2 weeks for repeat XR of her wrist to R/O a scaphoid fracture  She should stay in the splint full time other than to bathe but continue to move her digits.        Right wrist injury, initial encounter  See above  Orders:    Ambulatory Referral to Orthopedic Surgery      It is recommended they Return in about 2 weeks (around 5/2/2025) for Recheck with X-rays Right wrist out of splint.  she expressed understanding of the plan and agreed. We encouraged them to contact our office with any questions or concerns.       Parish Del Valle MD  Hand and Upper Extremity Surgery        *This note was dictated using Dragon voice recognition software. Please excuse any word substitutions or errors.*

## 2025-04-18 NOTE — ED PROVIDER NOTES
Time reflects when diagnosis was documented in both MDM as applicable and the Disposition within this note       Time User Action Codes Description Comment    4/17/2025 11:33 PM Frida Reyez Add [S69.92XA] Left wrist injury, initial encounter     4/17/2025 11:43 PM Frida Reyez Remove [S69.92XA] Left wrist injury, initial encounter     4/17/2025 11:43 PM Frida Reyez Add [S69.91XA] Right wrist injury, initial encounter           ED Disposition       ED Disposition   Discharge    Condition   Stable    Date/Time   u Apr 17, 2025 11:33 PM    Comment   Cassandra Monroe Adrián discharge to home/self care.                   Assessment & Plan       Medical Decision Making  DDx including but not limited to: contusion, fracture, sprain, strain, tendinitis  Will obtain XR to evaluate for fracture, dislocation. Pt offered Urine pregnancy test prior to Toradol, Oxycodone; declined. Patient states that she has no concern for pregnancy, is comfortable with receiving these medications without pregnancy test.  She verbalized understanding of risk.    XR with questionable fracture. Wrist splinted as below. Referral to Ortho placed, advised patient to follow up closely and keep splint on until follow up. She verbalized understanding and agreement    At the time of discharge, the patient is in no acute distress. I discussed with the patient the diagnosis, treatment plan, follow-up, return precautions, and discharge instructions; they were given the opportunity to ask questions and verbalized understanding.    Problems Addressed:  Right wrist injury, initial encounter: acute illness or injury    Amount and/or Complexity of Data Reviewed  External Data Reviewed: labs, radiology and notes.  Radiology: ordered and independent interpretation performed. Decision-making details documented in ED Course.    Risk  OTC drugs.  Prescription drug management.             Medications   oxyCODONE (ROXICODONE) split tablet 2.5 mg (2.5  mg Oral Given 25 2311)   ondansetron (ZOFRAN-ODT) dispersible tablet 4 mg (4 mg Oral Given 25 2309)   ketorolac (TORADOL) injection 15 mg (15 mg Intramuscular Given 25 2352)   acetaminophen (TYLENOL) tablet 975 mg (975 mg Oral Given 251)   oxyCODONE (ROXICODONE) split tablet 2.5 mg (2.5 mg Oral Given 25)       ED Risk Strat Scores                    No data recorded        SBIRT 20yo+      Flowsheet Row Most Recent Value   Initial Alcohol Screen: US AUDIT-C     1. How often do you have a drink containing alcohol? 1 Filed at: 2025   2. How many drinks containing alcohol do you have on a typical day you are drinking?  0 Filed at: 2025   3b. FEMALE Any Age, or MALE 65+: How often do you have 4 or more drinks on one occassion? 0 Filed at: 2025   Audit-C Score 1 Filed at: 2025   JOSUE: How many times in the past year have you...    Used an illegal drug or used a prescription medication for non-medical reasons? Never Filed at: 2025                            History of Present Illness       Chief Complaint   Patient presents with    Wrist Injury     Pt reports falling on hard floor and caught herself but now c/o R wrist pain. Reports unable to move wrist.        Past Medical History:   Diagnosis Date    Bicornate uterus     noted in previous ultrasound report    Liver disease     has spots on her liver    Liver spots     Migraine     history of    Miscarriage     sab x1    SVT (supraventricular tachycardia) (HCC)     last episode  with 45 minute syncope, loop monitor implanted 2016    Urinary tract infection     infrequent UTI    Varicella     positive disease      Past Surgical History:   Procedure Laterality Date    CARDIAC ELECTROPHYSIOLOGY MAPPING AND ABLATION  2016    CARDIAC LOOP RECORDER      CARDIAC SURGERY  2021    ablation for SVT     SECTION      x2    CHOLECYSTECTOMY  2017    Lap Eleanor     COMBINED REDUCTION MAMMAPLASTY W/ ABDOMINOPLASTY  2021    CYST REMOVAL      FL LUMBAR PUNCTURE DIAGNOSTIC  2016    MD  DELIVERY ONLY Bilateral 10/22/2018    Procedure:  SECTION () REPEAT;  Surgeon: Lauren Izquierdo MD;  [facility]: [facility];  Service: Obstetrics    TONSILECTOMY AND ADNOIDECTOMY        Family History   Problem Relation Age of Onset    Arthritis Mother     Deafness Mother     Infertility Mother     Miscarriages / Stillbirths Mother     Breast cancer Maternal Grandmother         under 45 years old    Diabetes Other     No Known Problems Father     Lymphoma Paternal Grandfather     Valvular heart disease Paternal Grandfather     Seizures Sister       Social History     Tobacco Use    Smoking status: Never    Smokeless tobacco: Never   Vaping Use    Vaping status: Never Used   Substance Use Topics    Alcohol use: No    Drug use: No      E-Cigarette/Vaping    E-Cigarette Use Never User       E-Cigarette/Vaping Substances    Nicotine No     THC No     CBD No     Flavoring No     Other No     Unknown No       I have reviewed and agree with the history as documented.     The patient is a 34 YOF with  hx of SVT who presents to the ED for evaluation of right wrist pain that began prior to arrival tonight when her foot got stuck on one of her children's toys, causing her to fall on her right hand outstretched. She took 600 mg of Ibuprofen 1 hour ago with no improvement in pain. She does report paresthesias in the hand and thumb. It is painful to move the wrist. She denies head strike or other injuries. She did break the same wrist when she was a child.        Review of Systems   Musculoskeletal:  Positive for arthralgias.           Objective       ED Triage Vitals   Temperature Pulse Blood Pressure Respirations SpO2 Patient Position - Orthostatic VS   25   98.4 °F (36.9 °C) 100 122/73 18 100 % Sitting       Temp Source Heart Rate Source BP Location FiO2 (%) Pain Score    04/17/25 2230 04/17/25 2230 04/17/25 2230 -- 04/17/25 2311    Oral Monitor Right arm  8      Vitals      Date and Time Temp Pulse SpO2 Resp BP Pain Score FACES Pain Rating User   04/17/25 2352 -- -- -- -- -- 10 - Worst Possible Pain -- VF   04/17/25 2351 -- -- -- -- -- 10 - Worst Possible Pain -- VF   04/17/25 2311 -- -- -- -- -- 8 -- VF   04/17/25 2230 98.4 °F (36.9 °C) 100 100 % 18 122/73 -- -- AA            Physical Exam  Vitals and nursing note reviewed.   Constitutional:       General: She is not in acute distress.     Appearance: She is well-developed.   HENT:      Head: Normocephalic and atraumatic.   Eyes:      Conjunctiva/sclera: Conjunctivae normal.   Cardiovascular:      Rate and Rhythm: Normal rate and regular rhythm.      Pulses: Normal pulses.   Pulmonary:      Effort: Pulmonary effort is normal. No respiratory distress.      Breath sounds: Normal breath sounds.   Abdominal:      General: Abdomen is flat.   Musculoskeletal:      Right elbow: Normal range of motion. No tenderness.      Right forearm: No bony tenderness.      Right wrist: Tenderness (overlying distal radius), bony tenderness and snuff box tenderness present. No crepitus. Decreased range of motion (suspect 2/2 pain). Normal pulse.      Right hand: No tenderness or bony tenderness. Decreased range of motion (decreased flexion of thumb, suspect 2/2 pain). Normal sensation. Normal capillary refill. Normal pulse.      Cervical back: Neck supple.   Skin:     General: Skin is warm and dry.      Capillary Refill: Capillary refill takes less than 2 seconds.   Neurological:      Mental Status: She is alert.   Psychiatric:         Mood and Affect: Mood normal.         Results Reviewed       None            XR wrist 3+ views RIGHT   ED Interpretation by Frida Reyez PA-C (04/18 0226)   Questionable disruption of the cortex of the distal radius, no obvious dislocation as  interpreted by me         XR hand 3+ views RIGHT   ED Interpretation by Frida Reyez PA-C (04/18 0226)   Questionable disruption of the cortex of the distal radius, no obvious dislocation as interpreted by me             Splint application    Date/Time: 4/17/2025 11:50 PM    Performed by: Frida Reyez PA-C  Authorized by: Frida Reyez PA-C    Other Assisting Provider: Yes (comment) (ED RN)    Verbal consent obtained?: Yes    Risks and benefits: Risks, benefits and alternatives were discussed    Consent given by:  Patient  Patient states understanding of procedure being performed: Yes    Patient's understanding of procedure matches consent: Yes    Patient identity confirmed:  Verbally with patient  Pre-procedure details:     Sensation:  Normal  Procedure details:     Laterality:  Right    Location:  Wrist    Splint composition: static      Splint type:  Thumb spica  Post-procedure details:     Pain:  Unchanged    Sensation:  Normal    Patient tolerance of procedure:  Tolerated well, no immediate complications      ED Medication and Procedure Management   Prior to Admission Medications   Prescriptions Last Dose Informant Patient Reported? Taking?   buPROPion (WELLBUTRIN XL) 150 mg 24 hr tablet   Yes No   Sig: TAKE ONE TABLET BY MOUTH EVERY DAY WITH 300MG TO EQUAL 450MG DAILY   doxycycline (ADOXA) 100 MG tablet   Yes No   Sig: TAKE 2 TABLETS BY MOUTH NOW   Patient not taking: Reported on 12/6/2024   ibuprofen (MOTRIN) 200 mg tablet  Self Yes Yes   Sig: Take 600 mg by mouth every 6 (six) hours as needed for mild pain or moderate pain   medroxyPROGESTERone acetate (DEPO-PROVERA SYRINGE) 150 mg/mL injection   No Yes   Sig: Inject 1 mL (150 mg total) into a muscle every 3 (three) months   ondansetron (ZOFRAN-ODT) 4 mg disintegrating tablet   Yes Yes   Sig: Take 4 mg by mouth every 8 (eight) hours as needed   phentermine 15 MG capsule   Yes No   Sig: Take 15 mg by mouth       Facility-Administered Medications Last Administration Doses Remaining   medroxyPROGESTERone acetate (DEPO-PROVERA SYRINGE) IM injection 150 mg 2/7/2025  4:00 PM         Discharge Medication List as of 4/17/2025 11:45 PM        START taking these medications    Details   acetaminophen (TYLENOL) 500 mg tablet Take 1 tablet (500 mg total) by mouth every 6 (six) hours as needed for mild pain or moderate pain, Starting Thu 4/17/2025, Normal      !! ibuprofen (MOTRIN) 600 mg tablet Take 1 tablet (600 mg total) by mouth every 6 (six) hours as needed for mild pain, Starting Thu 4/17/2025, Normal       !! - Potential duplicate medications found. Please discuss with provider.        CONTINUE these medications which have NOT CHANGED    Details   !! ibuprofen (MOTRIN) 200 mg tablet Take 600 mg by mouth every 6 (six) hours as needed for mild pain or moderate pain, Historical Med      medroxyPROGESTERone acetate (DEPO-PROVERA SYRINGE) 150 mg/mL injection Inject 1 mL (150 mg total) into a muscle every 3 (three) months, Starting Wed 9/18/2024, Normal      ondansetron (ZOFRAN-ODT) 4 mg disintegrating tablet Take 4 mg by mouth every 8 (eight) hours as needed, Starting Sat 2/22/2025, Historical Med      buPROPion (WELLBUTRIN XL) 150 mg 24 hr tablet TAKE ONE TABLET BY MOUTH EVERY DAY WITH 300MG TO EQUAL 450MG DAILY, Historical Med      doxycycline (ADOXA) 100 MG tablet TAKE 2 TABLETS BY MOUTH NOW, Historical Med      phentermine 15 MG capsule Take 15 mg by mouth, Starting Fri 11/15/2024, Historical Med       !! - Potential duplicate medications found. Please discuss with provider.          ED SEPSIS DOCUMENTATION   Time reflects when diagnosis was documented in both MDM as applicable and the Disposition within this note       Time User Action Codes Description Comment    4/17/2025 11:33 PM Frida Reyez Add [S69.92XA] Left wrist injury, initial encounter     4/17/2025 11:43 PM Frida Reyez Remove [S69.92XA] Left wrist  injury, initial encounter     4/17/2025 11:43 PM Frida Reyez Add [S69.91XA] Right wrist injury, initial encounter                  Frida Reyez PA-C  04/18/25 0232

## 2025-04-18 NOTE — DISCHARGE INSTRUCTIONS
Follow up with the Orthopedist as soon as possible    Take Ibuprofen and Tylenol as prescribed:  Alternate taking Tylenol and Motrin. You may give Tylenol every 6 hours, and Motrin every 6 hours. To stagger, give:  - Tylenol  - 3 hours later, give Motrin  -3 hours later, you will be due for Tylenol again  -3 hours later, you will be due for Motrin again    Rest, Ice, elevate. Wear splint until follow up    Return for severe pain, color change to fingers, or numbness

## 2025-05-02 ENCOUNTER — OFFICE VISIT (OUTPATIENT)
Dept: OBGYN CLINIC | Facility: CLINIC | Age: 35
End: 2025-05-02

## 2025-05-02 VITALS — HEIGHT: 68 IN | BODY MASS INDEX: 26.37 KG/M2 | WEIGHT: 174 LBS

## 2025-05-02 DIAGNOSIS — S69.91XA RIGHT WRIST INJURY, INITIAL ENCOUNTER: ICD-10-CM

## 2025-05-02 DIAGNOSIS — S63.501A SPRAIN OF RIGHT WRIST, INITIAL ENCOUNTER: Primary | ICD-10-CM

## 2025-05-02 PROCEDURE — 99214 OFFICE O/P EST MOD 30 MIN: CPT | Performed by: ORTHOPAEDIC SURGERY

## 2025-05-02 NOTE — PROGRESS NOTES
HAND & UPPER EXTREMITY OFFICE VISIT       Assessment and Plan:  Assessment & Plan  Sprain of right wrist, initial encounter  See Plan below  Orders:    XR wrist 3+ vw right; Future    Right wrist injury, initial encounter  34-year-old with sprain of the right wrist and a possible scaphoid fracture.  Her x-ray is unclear whether or not the subtle lucency represents a nondisplaced scaphoid fracture and an MRI would be the imaging modality for better determination of her injury and treatment.  Plan  - Ordered MRI of the right wrist noncontrast  - Continue light use of right hand and wrist brace  - Follow-up after MRI for discussion of nonoperative versus operative treatment.      Orders:    XR wrist 3+ vw right; Future    MRI wrist right wo contrast; Future        It is recommended she return to the office  after MRI    she expressed understanding of the plan and agreed. We encouraged them to contact our office with any questions or concerns.       Chief Complaint:     Right wrist pain   DOI 4/17/2025     Previous History:   Previously seen on 4/18/2025. At that point she was instructed to remain in the splint for an additional 2 weeks and return for follow-up to evaluate for possible scaphoid fracture.    Interval History:  Since the last visit she states that her wrist has been feeling better but she has noticed pain over the dorsal radial aspect proximal to the joint as well as in her anatomic snuffbox.  She has been wearing her wrist brace part of the time.  Denies any new injury or numbness or tingling.    Past Medical History:  Past Medical History:   Diagnosis Date    Bicornate uterus     noted in previous ultrasound report    Liver disease     has spots on her liver    Liver spots     Migraine     history of    Miscarriage     sab x1    SVT (supraventricular tachycardia) (HCC)     last episode 2017 with 45 minute syncope, loop monitor implanted 12/2016    Urinary tract infection     infrequent UTI    Varicella      positive disease     Past Surgical History:   Procedure Laterality Date    CARDIAC ELECTROPHYSIOLOGY MAPPING AND ABLATION  2016    CARDIAC LOOP RECORDER      CARDIAC SURGERY  2021    ablation for SVT     SECTION      x2    CHOLECYSTECTOMY  2017    Lap Eleanor    COMBINED REDUCTION MAMMAPLASTY W/ ABDOMINOPLASTY  2021    CYST REMOVAL      FL LUMBAR PUNCTURE DIAGNOSTIC  2016    [facility]  DELIVERY ONLY Bilateral 10/22/2018    Procedure:  SECTION () REPEAT;  Surgeon: Lauren Izquierdo MD;  Location: [facility];  Service: Obstetrics    TONSILECTOMY AND ADNOIDECTOMY       Family History   Problem Relation Age of Onset    Arthritis Mother     Deafness Mother     Infertility Mother     Miscarriages / Stillbirths Mother     Breast cancer Maternal Grandmother         under 45 years old    Diabetes Other     No Known Problems Father     Lymphoma Paternal Grandfather     Valvular heart disease Paternal Grandfather     Seizures Sister      Social History     Socioeconomic History    Marital status:      Spouse name: Not on file    Number of children: 2    Years of education: Not on file    Highest education level: Not on file   Occupational History    Not on file   Tobacco Use    Smoking status: Never    Smokeless tobacco: Never   Vaping Use    Vaping status: Never Used   Substance and Sexual Activity    Alcohol use: No    Drug use: No    Sexual activity: Yes     Partners: Male     Birth control/protection: Injection   Other Topics Concern    Not on file   Social History Narrative    Exercises 3 times weekly    Pets: Dog     Social Drivers of Health     Financial Resource Strain: Patient Declined (2024)    Received from [facility]    Overall Financial Resource Strain (CARDIA)     Difficulty of Paying Living Expenses: Patient declined   Food Insecurity: Patient Declined (2024)    Received from [facility]    Hunger Vital Sign      "Worried About Running Out of Food in the Last Year: Patient declined     Ran Out of Food in the Last Year: Patient declined   Transportation Needs: Patient Declined (8/25/2024)    Received from [facility]    PRAPARE - Transportation     Lack of Transportation (Medical): Patient declined     Lack of Transportation (Non-Medical): Patient declined   Physical Activity: Not on file   Stress: Stress Concern Present (8/25/2024)    Received from [facility]    [employer] - Occupational Stress Questionnaire     Feeling of Stress : Very much   Social Connections: Feeling Socially Isolated (8/25/2024)    Received from [facility]    OASIS : Social Isolation     How often do you feel lonely or isolated from those around you?: Often   Intimate Partner Violence: Not At Risk (8/25/2024)    [facility]    Humiliation, Afraid, Rape, and Kick questionnaire     Fear of Current or Ex-Partner: No     Emotionally Abused: No     Physically Abused: No     Sexually Abused: No   Housing Stability: Patient Declined (8/25/2024)    Received from [facility]    Housing Stability Vital Sign     Unable to Pay for Housing in the Last Year: Patient declined     Number of Times Moved in the Last Year: Not on file     Homeless in the Last Year: Patient declined     Scheduled Meds:  Current Facility-Administered Medications   Medication Dose Route Frequency Provider Last Rate    medroxyPROGESTERone acetate  150 mg Intramuscular Q3 Months MARTINEZ Hudson       Continuous Infusions:   PRN Meds:.  Allergies   Allergen Reactions    Pollen Extract Allergic Rhinitis       Physical Examination:    Ht 5' 8\" (1.727 m)   Wt 78.9 kg (174 lb)   BMI 26.46 kg/m²     Gen: A&Ox3, NAD  Cardiac: regular rate  Chest: non labored breathing  Abdomen: Non-distended        Right Upper Extremity:  Skin " "CDI  No obvious deformity of the shoulder, arm, elbow, forearm, wrist, hand  Composite fist   Tender at radial wrist, 1st and 2nd dorsal compartments mainly, TTP over anatomic snuffbox  +scaphoid squeeze test today  Thumb extension is painful  Sensation intact to light touch in the axillary median, ulnar, and radial nerve distributions  motor intact but minimal wrist range of motion due to pain  2+RP      Studies:  Radiographs: I personally reviewed and independently interpreted the available radiographs: Radiographs of the right wrist, multiple views, demonstrate a subtle lucency near the proximal one third of the scaphoid that may represent a nondisplaced fracture..       Labs:  I personally reviewed the following labs,   No results found for: \"HGBA1C\"            Parish Del Valle MD  Hand and Upper Extremity Surgery      *This note was dictated using Dragon voice recognition software. Please excuse any word substitutions or errors.*       Scribe Attestation      I,:   am acting as a scribe while in the presence of the attending physician.:       I,:   personally performed the services described in this documentation    as scribed in my presence.:             "

## 2025-05-05 ENCOUNTER — TELEPHONE (OUTPATIENT)
Age: 35
End: 2025-05-05

## 2025-05-05 DIAGNOSIS — S63.501A SPRAIN OF RIGHT WRIST, INITIAL ENCOUNTER: Primary | ICD-10-CM

## 2025-05-05 NOTE — TELEPHONE ENCOUNTER
Caller: Self    Doctor: Dr. Del Valle    Reason for call: Patient cannot get MRI at [facility] due to insurance. She can get it done at [facility] but would need a STAT script dated for today faxed to them at  [facility]. Can someone let patient know when this has been done so she can get scheduled    Call back#: [phone]

## 2025-05-05 NOTE — TELEPHONE ENCOUNTER
Called patient and left message to inform patient that this has been faxed so she can schedule this and to call back with any further questions or concerns.

## 2025-05-06 NOTE — TELEPHONE ENCOUNTER
IN-BASKET MESSAGE SENT TO [facility]    Caller: [facility]    Doctor: Dr. Del Valle    Reason for call: Calling to follow up with the status of STAT MRI auth    Call back#: [phone]

## 2025-05-06 NOTE — TELEPHONE ENCOUNTER
Caller: Patient     Doctor: Dr. Del Valle     Reason for call: patient is calling regarding stat mri prior authorization and stating [facility] has not yet received it patient has appt today @ 8:00 pm please advise [id number] [facility] [id number]         Call back#: [phone]

## 2025-05-07 ENCOUNTER — TELEPHONE (OUTPATIENT)
Dept: OBGYN CLINIC | Facility: MEDICAL CENTER | Age: 35
End: 2025-05-07

## 2025-05-07 NOTE — TELEPHONE ENCOUNTER
Patient called to reschedule Depo injection, stated she did not want to go to the [place] office, she said she waited over an hour today, whoever she spoke with was very rude, and left because she had to go to work. She needed the Depo by Friday, I offered [facility] but she said it was to far, I explained we are not in [place] the rest of the week. I apologized for her experience at the [facility] today and made her aware I would document what happened. Patient was appreciative and decided to schedule at the [place] office because of availability. Patient is scheduled on 5/8.

## 2025-05-07 NOTE — TELEPHONE ENCOUNTER
Pt called to reschedule her depo on [facility] office. I offered her the next available 5/13 pt refuse. Pt stated her last depo was 2/7 and  her next one should be between 4/25 to 5/9. I explained to pt that 5/13 is the next one on [facility] but we can re-schedule in  [facility] for this week before 5/9.pt hung up on me.

## 2025-05-07 NOTE — TELEPHONE ENCOUNTER
Pt came in office for her depo and her appointment was at 05/07/25 at 9.15 am. Pt came in at 9.36 which has passed the 15 min policy. I was explaining that to her , then pt was mad and said this only a nurse visit it shouldn't take long. I told her I understand but I have to let the MA in the back know because they are running with provider. Pt was not happy, one of the MA came talked to her that we will see her for today but it will be a little bit of waiting. After couple minutes of waiting pt came to take back her depo and left unhappy.

## 2025-05-08 ENCOUNTER — CLINICAL SUPPORT (OUTPATIENT)
Dept: OBGYN CLINIC | Facility: MEDICAL CENTER | Age: 35
End: 2025-05-08
Payer: COMMERCIAL

## 2025-05-08 DIAGNOSIS — Z30.42 ENCOUNTER FOR DEPO-PROVERA CONTRACEPTION: Primary | ICD-10-CM

## 2025-05-08 DIAGNOSIS — R39.9 UTI SYMPTOMS: ICD-10-CM

## 2025-05-08 PROCEDURE — 96372 THER/PROPH/DIAG INJ SC/IM: CPT

## 2025-05-08 RX ADMIN — MEDROXYPROGESTERONE ACETATE 150 MG: 150 INJECTION, SUSPENSION INTRAMUSCULAR at 08:53

## 2025-05-08 NOTE — PROGRESS NOTES
Pt presented for Depo injection  Given IM LUOQ  Pt Tolerated Well  Pt Supplied     NDC: 14251-018-12  LOT: [id number]  EXP: 06/2026    Pt stating she's having UTI symptoms but recently took AZO 2 days ago, urine labs will be put in and pt with get them done in no less then 4 days.

## 2025-05-09 ENCOUNTER — TELEPHONE (OUTPATIENT)
Age: 35
End: 2025-05-09

## 2025-05-09 NOTE — TELEPHONE ENCOUNTER
Caller: Patient    Doctor: Dr. Del Valle    Reason for call: Questioned status of MRI results? Advised patients come into office for review. Stated she was advised she would get a call w/MRI results. Please contact the patient to discuss    Call back#: [phone]

## 2025-06-04 ENCOUNTER — CLINICAL SUPPORT (OUTPATIENT)
Dept: OBGYN CLINIC | Facility: MEDICAL CENTER | Age: 35
End: 2025-06-04
Payer: COMMERCIAL

## 2025-06-04 DIAGNOSIS — Z23 NEED FOR HPV VACCINATION: Primary | ICD-10-CM

## 2025-06-04 PROCEDURE — 90651 9VHPV VACCINE 2/3 DOSE IM: CPT

## 2025-06-04 PROCEDURE — 90471 IMMUNIZATION ADMIN: CPT

## 2025-06-04 NOTE — PROGRESS NOTES
Pt presented for 3rd HPV Injection  Given IM Right Deltoid   Pt tolerated well     NDC: 3422-4186-45  LOT :[id number]  EXP: 02/27/2026

## 2025-07-29 ENCOUNTER — CLINICAL SUPPORT (OUTPATIENT)
Dept: OBGYN CLINIC | Facility: MEDICAL CENTER | Age: 35
End: 2025-07-29

## 2025-07-29 DIAGNOSIS — Z30.42 ENCOUNTER FOR DEPO-PROVERA CONTRACEPTION: Primary | ICD-10-CM

## 2025-07-29 PROCEDURE — 96372 THER/PROPH/DIAG INJ SC/IM: CPT

## 2025-07-29 RX ADMIN — MEDROXYPROGESTERONE ACETATE 150 MG: 150 INJECTION, SUSPENSION INTRAMUSCULAR at 09:04

## 2025-07-30 ENCOUNTER — TELEPHONE (OUTPATIENT)
Dept: OBGYN CLINIC | Facility: MEDICAL CENTER | Age: 35
End: 2025-07-30

## (undated) DEVICE — CHLORAPREP HI-LITE 26ML ORANGE

## (undated) DEVICE — SUT MONOCRYL 4-0 PS-2 27 IN Y426H

## (undated) DEVICE — PACK C-SECTION PBDS

## (undated) DEVICE — ABG MICROSTICKS SAFETY

## (undated) DEVICE — SUT VICRYL 0 CT-1 36 IN J946H

## (undated) DEVICE — SUT CHROMIC 0 CT-1 27 IN 812H

## (undated) DEVICE — SWABSTCK, BENZOIN TINCTURE, 1/PK, STRL: Brand: APLICARE

## (undated) DEVICE — GLOVE SRG BIOGEL ECLIPSE 7

## (undated) DEVICE — GLOVE INDICATOR PI UNDERGLOVE SZ 7.5 BLUE

## (undated) DEVICE — SUT VICRYL 0 CTX 36 IN J978H

## (undated) DEVICE — 3M™ STERI-STRIP™ REINFORCED ADHESIVE SKIN CLOSURES, R1547, 1/2 IN X 4 IN (12 MM X 100 MM), 6 STRIPS/ENVELOPE: Brand: 3M™ STERI-STRIP™